# Patient Record
Sex: MALE | Race: WHITE
[De-identification: names, ages, dates, MRNs, and addresses within clinical notes are randomized per-mention and may not be internally consistent; named-entity substitution may affect disease eponyms.]

---

## 2017-04-19 ENCOUNTER — HOSPITAL ENCOUNTER (OUTPATIENT)
Dept: HOSPITAL 17 - NEPE | Age: 57
Setting detail: OBSERVATION
LOS: 1 days | Discharge: HOME | End: 2017-04-20
Attending: INTERNAL MEDICINE | Admitting: INTERNAL MEDICINE
Payer: MEDICARE

## 2017-04-19 VITALS
HEART RATE: 52 BPM | TEMPERATURE: 98.6 F | OXYGEN SATURATION: 95 % | RESPIRATION RATE: 20 BRPM | SYSTOLIC BLOOD PRESSURE: 107 MMHG | DIASTOLIC BLOOD PRESSURE: 61 MMHG

## 2017-04-19 VITALS
HEART RATE: 62 BPM | TEMPERATURE: 98.2 F | DIASTOLIC BLOOD PRESSURE: 63 MMHG | RESPIRATION RATE: 20 BRPM | SYSTOLIC BLOOD PRESSURE: 111 MMHG

## 2017-04-19 VITALS — DIASTOLIC BLOOD PRESSURE: 62 MMHG | SYSTOLIC BLOOD PRESSURE: 112 MMHG

## 2017-04-19 VITALS — WEIGHT: 209.44 LBS | BODY MASS INDEX: 38.54 KG/M2 | HEIGHT: 62 IN

## 2017-04-19 VITALS — OXYGEN SATURATION: 100 %

## 2017-04-19 VITALS
SYSTOLIC BLOOD PRESSURE: 120 MMHG | HEART RATE: 61 BPM | OXYGEN SATURATION: 100 % | DIASTOLIC BLOOD PRESSURE: 70 MMHG | RESPIRATION RATE: 17 BRPM

## 2017-04-19 DIAGNOSIS — Z79.899: ICD-10-CM

## 2017-04-19 DIAGNOSIS — R11.0: ICD-10-CM

## 2017-04-19 DIAGNOSIS — M54.9: ICD-10-CM

## 2017-04-19 DIAGNOSIS — F17.210: ICD-10-CM

## 2017-04-19 DIAGNOSIS — G89.29: ICD-10-CM

## 2017-04-19 DIAGNOSIS — R06.02: ICD-10-CM

## 2017-04-19 DIAGNOSIS — R00.1: ICD-10-CM

## 2017-04-19 DIAGNOSIS — R61: ICD-10-CM

## 2017-04-19 DIAGNOSIS — Z76.0: ICD-10-CM

## 2017-04-19 DIAGNOSIS — I25.10: ICD-10-CM

## 2017-04-19 DIAGNOSIS — I35.0: ICD-10-CM

## 2017-04-19 DIAGNOSIS — M54.2: ICD-10-CM

## 2017-04-19 DIAGNOSIS — R07.9: Primary | ICD-10-CM

## 2017-04-19 DIAGNOSIS — I10: ICD-10-CM

## 2017-04-19 DIAGNOSIS — F31.9: ICD-10-CM

## 2017-04-19 LAB
ANION GAP SERPL CALC-SCNC: 9 MEQ/L (ref 5–15)
APTT BLD: 26.6 SEC (ref 24.3–30.1)
BASOPHILS # BLD AUTO: 0.1 TH/MM3 (ref 0–0.2)
BASOPHILS NFR BLD: 1.1 % (ref 0–2)
BUN SERPL-MCNC: 16 MG/DL (ref 7–18)
CHLORIDE SERPL-SCNC: 103 MEQ/L (ref 98–107)
CK SERPL-CCNC: 54 U/L (ref 39–308)
CK SERPL-CCNC: 88 U/L (ref 39–308)
EOSINOPHIL # BLD: 0.1 TH/MM3 (ref 0–0.4)
EOSINOPHIL NFR BLD: 1.5 % (ref 0–4)
ERYTHROCYTE [DISTWIDTH] IN BLOOD BY AUTOMATED COUNT: 14.6 % (ref 11.6–17.2)
GFR SERPLBLD BASED ON 1.73 SQ M-ARVRAT: 55 ML/MIN (ref 89–?)
HCO3 BLD-SCNC: 29.1 MEQ/L (ref 21–32)
HCT VFR BLD CALC: 38.9 % (ref 39–51)
HEMO FLAGS: (no result)
INR PPP: 1 RATIO
LYMPHOCYTES # BLD AUTO: 1.7 TH/MM3 (ref 1–4.8)
LYMPHOCYTES NFR BLD AUTO: 24.3 % (ref 9–44)
MAGNESIUM SERPL-MCNC: 2.4 MG/DL (ref 1.5–2.5)
MCH RBC QN AUTO: 30.2 PG (ref 27–34)
MCHC RBC AUTO-ENTMCNC: 33.5 % (ref 32–36)
MCV RBC AUTO: 89.9 FL (ref 80–100)
MONOCYTES NFR BLD: 8.8 % (ref 0–8)
NEUTROPHILS # BLD AUTO: 4.5 TH/MM3 (ref 1.8–7.7)
NEUTROPHILS NFR BLD AUTO: 64.3 % (ref 16–70)
PLATELET # BLD: 273 TH/MM3 (ref 150–450)
POTASSIUM SERPL-SCNC: 4.4 MEQ/L (ref 3.5–5.1)
PROTHROMBIN TIME: 10.8 SEC (ref 9.8–11.6)
RBC # BLD AUTO: 4.33 MIL/MM3 (ref 4.5–5.9)
SODIUM SERPL-SCNC: 141 MEQ/L (ref 136–145)
WBC # BLD AUTO: 7.1 TH/MM3 (ref 4–11)

## 2017-04-19 PROCEDURE — 96372 THER/PROPH/DIAG INJ SC/IM: CPT

## 2017-04-19 PROCEDURE — 93017 CV STRESS TEST TRACING ONLY: CPT

## 2017-04-19 PROCEDURE — 93306 TTE W/DOPPLER COMPLETE: CPT

## 2017-04-19 PROCEDURE — G0378 HOSPITAL OBSERVATION PER HR: HCPCS

## 2017-04-19 PROCEDURE — 82550 ASSAY OF CK (CPK): CPT

## 2017-04-19 PROCEDURE — 85730 THROMBOPLASTIN TIME PARTIAL: CPT

## 2017-04-19 PROCEDURE — A9502 TC99M TETROFOSMIN: HCPCS

## 2017-04-19 PROCEDURE — 84484 ASSAY OF TROPONIN QUANT: CPT

## 2017-04-19 PROCEDURE — 99285 EMERGENCY DEPT VISIT HI MDM: CPT

## 2017-04-19 PROCEDURE — 85610 PROTHROMBIN TIME: CPT

## 2017-04-19 PROCEDURE — 71010: CPT

## 2017-04-19 PROCEDURE — 83735 ASSAY OF MAGNESIUM: CPT

## 2017-04-19 PROCEDURE — 78452 HT MUSCLE IMAGE SPECT MULT: CPT

## 2017-04-19 PROCEDURE — 99283 EMERGENCY DEPT VISIT LOW MDM: CPT

## 2017-04-19 PROCEDURE — 93005 ELECTROCARDIOGRAM TRACING: CPT

## 2017-04-19 PROCEDURE — 80048 BASIC METABOLIC PNL TOTAL CA: CPT

## 2017-04-19 PROCEDURE — 85025 COMPLETE CBC W/AUTO DIFF WBC: CPT

## 2017-04-19 NOTE — PD
HPI


Chief Complaint:  Chest Pain


Time Seen by Provider:  18:32


Travel History


International Travel<30 days:  No


Contact w/Intl Traveler<30days:  No


Traveled to known affect area:  No





History of Present Illness


HPI


Patient comes in complaining of substernal chest pain that began shortly prior 

to arrival.  Patient states he had 2 baby aspirins en route by EMS with no 

improvement of symptoms.  Patient states over the past couple months he been 

having pain in his bilateral anterior thighs is been causing him to fall.  

States his primary care doctor has referred him to orthopedics as an 

outpatient.  Patient reports associated shortness of breath with chest pain 

today as well as diaphoresis and nausea.  Patient state also having pain in his 

left upper extremity with this.  Patient reports a history of chronic neck and 

back pain as well as aortic stenosis.  Denies IV drug use or fevers.





PFSH


Past Medical History


Hx Anticoagulant Therapy:  No


Arthritis:  Yes


Bipolar Disorder:  Yes


Anxiety:  Yes


Depression:  Yes


Cancer:  No


Cardiovascular Problems:  Yes (Aortic Stenosis)


Chemotherapy:  No


Cerebrovascular Accident:  No


Diabetes:  No


Endocrine:  No


Gastrointestinal Disorders:  No


Genitourinary:  Yes (URETHRAL BLEEDING; DISCOLORATION - BANGURA IN PLACE)


Hepatitis:  No


Hiatal Hernia:  No


Hypertension:  Yes


Immune Disorder:  No


Musculoskeletal:  Yes (chronic pain per pt)


Neurologic:  Yes (CERVICAL AND LUMBAR FUSIONS; NEUROPATHY LEFT ARM & LEG )


Psychiatric:  Yes (ANXIETY/MOOD DISORDER)


Reproductive:  No


Respiratory:  No


Thyroid Disease:  No





Past Surgical History


Abdominal Surgery:  No


Body Medical Devices:  PLATES & SCREWS NECK


Cardiac Surgery:  No


Ear Surgery:  No


Endocrine Surgery:  No


Eye Surgery:  No


Genitourinary Surgery:  Yes (cysto)


Neurologic Surgery:  Yes


Oral Surgery:  No


Thoracic Surgery:  No


Other Surgery:  Yes (HAND RECONSTRUCTION RIGHT )





Social History


Alcohol Use:  Yes


Tobacco Use:  Yes


Substance Use:  Yes





Allergies-Medications


(Allergen,Severity, Reaction):  


Coded Allergies:  


     Morphine (Verified  Allergy, Unknown, swelling, 4/19/17)


     Phenobarbital (Verified  Allergy, Unknown, unknown, 4/19/17)


Reported Meds & Prescriptions





Reported Meds & Active Scripts


Active


Zofran ODT (Ondansetron HCl) 4 Mg Tab 4 Mg SL Q6H PRN


     FOR NAUSEA/VOMITING


Meclizine Hcl (Meclizine HCl) 25 Mg Chw 25 Mg PO Q8H PRN


Celebrex (Celecoxib) 100 Mg Cap 100 Mg PO DAILY 


Xanax 1 mg (Alprazolam) Alprazolam 1 mg Tab 1 Tab PO TID PRN


Depakote  mg (Divalproex Sodium) 500 Mg Tab 1 Tab PO DAILY 


Zestoretic 20/12.5 (Lisinopril/Hctz 20 mg/12.5 mg) 20 Mg/12.5 Mg Tab 1 Tab PO 

DAILY 


Robaxin  500 Mg Tab (Methocarbamol) 500 Mg Tab 500 Mg PO QID PRN 7 Days


Reported


Cialis (Tadalafil) 20 Mg Tab 20 Mg PO DAILY PRN


Testosterone Cypionate 100 Mg/Ml  Inj 100 Mg IM Q7DAY 








Review of Systems


Except as stated in HPI:  all other systems reviewed are Neg





Physical Exam


Narrative


GENERAL: Well-developed, well nourished, in no acute distress, and non-ill 

appearing.


SKIN: Focused skin assessment warm and dry.


HEAD: Atraumatic. Normocephalic. 


EYES: Pupils equal and round. EOMI. No scleral icterus. No injection or 

drainage. 


ENT: No nasal bleeding or discharge.  Mucous membranes pink and moist.


NECK: Trachea midline. No JVD. Supple.  No nuclear rigidity.


CARDIOVASCULAR: Regular rate and rhythm.  Murmur appreciated.


RESPIRATORY: No accessory muscle use.  No respiratory distress. Clear to 

auscultation. Breath sounds equal bilaterally. 


GASTROINTESTINAL: Abdomen soft, non-tender, nondistended. Hepatic and splenic 

margins not palpable. No pulsatile mass.


MUSCULOSKELETAL: No obvious deformities. No clubbing.  No cyanosis.  No edema.  

Full range of motion.


NEUROLOGICAL: Awake and alert. No obvious cranial nerve deficits.  Motor 

grossly within normal limits. Normal speech.


PSYCHIATRIC: Appropriate mood and affect; insight and judgment normal.





Data


Data


Last Documented VS





Vital Signs








  Date Time  Temp Pulse Resp B/P Pulse Ox O2 Delivery O2 Flow Rate FiO2


 


4/19/17 20:36  61 17 120/70 100 Nasal Cannula 1 


 


4/19/17 18:45 98.2       








Orders





 Electrocardiogram (4/19/17 18:41)


Basic Metabolic Panel (Bmp) (4/19/17 18:41)


Ckmb (Isoenzyme) Profile (4/19/17 18:41)


Complete Blood Count With Diff (4/19/17 18:41)


Magnesium (Mg) (4/19/17 18:41)


Prothrombin Time / Inr (Pt) (4/19/17 18:41)


Act Partial Throm Time (Ptt) (4/19/17 18:41)


Troponin I (4/19/17 18:41)


Chest, Single Ap (4/19/17 18:41)


Ecg Monitoring (4/19/17 18:41)


Bilateral Bp Monitoring (4/19/17 18:41)


Iv Access Insert/Monitor (4/19/17 18:41)


Oximetry (4/19/17 18:41)


Oxygen Administration (4/19/17 18:41)


Sodium Chloride 0.9% Flush (Ns Flush) (4/19/17 18:45)


Acetaminophen (Tylenol) (4/19/17 20:30)


Ketorolac Inj (Toradol Inj) (4/19/17 20:45)


Admit Order (Ed Use Only) (4/19/17 20:39)





Labs





 Laboratory Tests








Test 4/19/17





 18:30


 


White Blood Count 7.1 TH/MM3


 


Red Blood Count 4.33 MIL/MM3


 


Hemoglobin 13.0 GM/DL


 


Hematocrit 38.9 %


 


Mean Corpuscular Volume 89.9 FL


 


Mean Corpuscular Hemoglobin 30.2 PG


 


Mean Corpuscular Hemoglobin 33.5 %





Concent 


 


Red Cell Distribution Width 14.6 %


 


Platelet Count 273 TH/MM3


 


Mean Platelet Volume 9.0 FL


 


Neutrophils (%) (Auto) 64.3 %


 


Lymphocytes (%) (Auto) 24.3 %


 


Monocytes (%) (Auto) 8.8 %


 


Eosinophils (%) (Auto) 1.5 %


 


Basophils (%) (Auto) 1.1 %


 


Neutrophils # (Auto) 4.5 TH/MM3


 


Lymphocytes # (Auto) 1.7 TH/MM3


 


Monocytes # (Auto) 0.6 TH/MM3


 


Eosinophils # (Auto) 0.1 TH/MM3


 


Basophils # (Auto) 0.1 TH/MM3


 


CBC Comment DIFF FINAL 


 


Differential Comment  


 


Prothrombin Time 10.8 SEC


 


Prothromb Time International 1.0 RATIO





Ratio 


 


Activated Partial 26.6 SEC





Thromboplast Time 


 


Sodium Level 141 MEQ/L


 


Potassium Level 4.4 MEQ/L


 


Chloride Level 103 MEQ/L


 


Carbon Dioxide Level 29.1 MEQ/L


 


Anion Gap 9 MEQ/L


 


Blood Urea Nitrogen 16 MG/DL


 


Creatinine 1.34 MG/DL


 


Estimat Glomerular Filtration 55 ML/MIN





Rate 


 


Random Glucose 85 MG/DL


 


Calcium Level 9.1 MG/DL


 


Magnesium Level 2.4 MG/DL


 


Total Creatine Kinase 88 U/L


 


Troponin I LESS THAN 0.02





 NG/ML











MDM


Medical Decision Making


Medical Screen Exam Complete:  Yes


Emergency Medical Condition:  Yes


Interpretation(s)


EKG reviewed by Dr. Resendiz shows sinus rhythm with ventricular rate of 67.  No 

STEMI.


Differential Diagnosis


Acute coronary syndrome, electrolyte abnormality, arrhythmia, atypical chest 

pain, noncardiac chest pain, other


Narrative Course


Patient is a exam.  Initial laboratory neurological status were obtained and 

reviewed.  Discussed patient with Dr. Nunes, recommends having patient placed in 

the chest pain center for further treatment and evaluation.  Discussed all 

findings and plan care of patient, who is agreeable for admission.  All 

questions were answered.  Patient was given a dose of Tylenol for his pain 

however was requesting Dilaudid.  Patient was given a dose of Toradol after 

discussing this with Dr. Nunes.





Diagnosis





 Primary Impression:  


 Chest pain


 Qualified Code:  R07.9 - Chest pain, unspecified type





Admitting Information


Admitting Physician Requests:  Observation


Condition:  Stable








Brayden Ram Apr 19, 2017 18:33

## 2017-04-20 ENCOUNTER — HOSPITAL ENCOUNTER (EMERGENCY)
Dept: HOSPITAL 17 - NEPK | Age: 57
Discharge: HOME | End: 2017-04-20
Payer: MEDICARE

## 2017-04-20 VITALS
SYSTOLIC BLOOD PRESSURE: 110 MMHG | OXYGEN SATURATION: 93 % | HEART RATE: 51 BPM | TEMPERATURE: 96.8 F | DIASTOLIC BLOOD PRESSURE: 58 MMHG | RESPIRATION RATE: 20 BRPM

## 2017-04-20 VITALS
DIASTOLIC BLOOD PRESSURE: 60 MMHG | SYSTOLIC BLOOD PRESSURE: 110 MMHG | HEART RATE: 54 BPM | OXYGEN SATURATION: 94 % | RESPIRATION RATE: 20 BRPM | TEMPERATURE: 96.3 F

## 2017-04-20 VITALS — HEART RATE: 51 BPM

## 2017-04-20 VITALS
SYSTOLIC BLOOD PRESSURE: 101 MMHG | RESPIRATION RATE: 18 BRPM | TEMPERATURE: 97.8 F | HEART RATE: 52 BPM | DIASTOLIC BLOOD PRESSURE: 56 MMHG | OXYGEN SATURATION: 97 %

## 2017-04-20 VITALS
OXYGEN SATURATION: 96 % | HEART RATE: 55 BPM | RESPIRATION RATE: 16 BRPM | SYSTOLIC BLOOD PRESSURE: 112 MMHG | TEMPERATURE: 98.6 F | DIASTOLIC BLOOD PRESSURE: 58 MMHG

## 2017-04-20 VITALS — BODY MASS INDEX: 30.93 KG/M2 | WEIGHT: 216.05 LBS | HEIGHT: 70 IN

## 2017-04-20 VITALS
OXYGEN SATURATION: 100 % | HEART RATE: 70 BPM | SYSTOLIC BLOOD PRESSURE: 111 MMHG | TEMPERATURE: 97.8 F | RESPIRATION RATE: 16 BRPM | DIASTOLIC BLOOD PRESSURE: 71 MMHG

## 2017-04-20 VITALS — HEART RATE: 57 BPM

## 2017-04-20 DIAGNOSIS — Z76.0: ICD-10-CM

## 2017-04-20 DIAGNOSIS — I10: ICD-10-CM

## 2017-04-20 DIAGNOSIS — G89.29: Primary | ICD-10-CM

## 2017-04-20 LAB — CK SERPL-CCNC: 50 U/L (ref 39–308)

## 2017-04-20 PROCEDURE — 99283 EMERGENCY DEPT VISIT LOW MDM: CPT

## 2017-04-20 PROCEDURE — 96372 THER/PROPH/DIAG INJ SC/IM: CPT

## 2017-04-20 NOTE — PD
Physical Exam


Time Seen by Provider:  17:31


Narrative


55yo M requesting pain medication.  Was just discharged from the hospital and 

was requesting pain medication and Xanax before he left and they would not 

administer it before discharge.  Says he has pain medication at home, but needs 

something for the bus ride home.  





Patient stable.  Patient seen in triage. Awaiting bed placement.





Data


Data


Last Documented VS





Vital Signs








  Date Time  Temp Pulse Resp B/P Pulse Ox O2 Delivery O2 Flow Rate FiO2


 


4/20/17 17:24 97.8 70 16 111/71 100   











MDM


Supervised Visit with ANA:  Marisa Crane Apr 20, 2017 17:34

## 2017-04-20 NOTE — PD
HPI


.


needs pain meds and xanax


Chief Complaint:  Pain: Acute or Chronic


Time Seen by Provider:  16:55


Travel History


International Travel<30 days:  No


Contact w/Intl Traveler<30days:  No


Traveled to known affect area:  No





History of Present Illness


HPI


56-year-old male who was recently discharged from the hospital here requesting 

pain meds and Xanax.  Patient was admitted overnight for chest pain and was 

found to have negative evidence of acute coronary syndrome.  Apparently he's 

been requesting pain medications prior to discharge and was declined by the 

physician in the chest pain Center.  Patient checked out of the hospital and 

came right over to the emergency department for medication refills. He is 

wanting pain medications because he was due to have pain meds at 330pm, but 

states since he was discharged and they would not give him his medication.  He 

takes MS contin and Percocet.





PFSH


Past Medical History


Hx Anticoagulant Therapy:  No


Arthritis:  Yes


Bipolar Disorder:  Yes


Anxiety:  Yes


Depression:  Yes


Heart Rhythm Problems:  No


Cancer:  No


Cardiac Catheterization:  No


Cardiovascular Problems:  Yes (aoritc stents )


High Cholesterol:  No


Chemotherapy:  No


Congestive Heart Failure:  No


Cerebrovascular Accident:  No


Coronary Artery Disease:  Yes (Aortic Stenosis. Mother and Father also had)


Diabetes:  No


Diminished Hearing:  No


Endocrine:  No


Gastrointestinal Disorders:  No


Genitourinary:  Yes (URETHRAL BLEEDING; DISCOLORATION - BANGURA IN PLACE)


Hepatitis:  No


Hiatal Hernia:  No


Hypertension:  Yes


Immune Disorder:  No


Musculoskeletal:  Yes (chronic pain per pt)


Neurologic:  Yes (CERVICAL AND LUMBAR FUSIONS; NEUROPATHY LEFT ARM & LEG )


Psychiatric:  Yes (ANXIETY/MOOD DISORDER)


Reproductive:  No


Respiratory:  No


Thyroid Disease:  No





Past Surgical History


Abdominal Surgery:  No


Body Medical Devices:  PLATES & SCREWS NECK


Cardiac Surgery:  No


Coronary Artery Bypass Graft:  No


Ear Surgery:  No


Endocrine Surgery:  No


Eye Surgery:  No


Genitourinary Surgery:  Yes (cysto)


Neurologic Surgery:  Yes


Oral Surgery:  No


Thoracic Surgery:  No


Other Surgery:  Yes (HAND RECONSTRUCTION RIGHT )





Social History


Alcohol Use:  Yes (Rare)


Tobacco Use:  No


Substance Use:  No





Allergies-Medications


(Allergen,Severity, Reaction):  


Coded Allergies:  


     Morphine (Verified  Allergy, Unknown, swelling, 4/20/17)


     Phenobarbital (Verified  Allergy, Unknown, unknown, 4/20/17)


Reported Meds & Prescriptions





Reported Meds & Active Scripts


Active


Reported


Zestoretic (Lisinopril-Hctz) 20-12.5 Mg Tab 1 Tab PO DAILY


Zanaflex (Tizanidine HCl) 4 Mg Cap 4 Mg PO TID


Cymbalta DR (Duloxetine HCl) 60 Mg Capdr 60 Mg PO DAILY


Alprazolam 1 Mg Tab 1 Mg PO QID PRN








Review of Systems


General / Constitutional:  No: Fever


Eyes:  No: Visual changes


HENT:  No: Headaches


Cardiovascular:  No: Chest Pain or Discomfort


Respiratory:  No: Shortness of Breath


Gastrointestinal:  No: Abdominal Pain


Genitourinary:  No: Dysuria


Musculoskeletal:  Positive: Pain (chronic joint pain)


Skin:  No Rash


Neurologic:  No: Weakness


Psychiatric:  No: Depression


Endocrine:  No: Polydipsia


Hematologic/Lymphatic:  No: Easy Bruising





Physical Exam


Narrative


GENERAL: AAO x 3, no acute distress, Well-nourished, well-developed patient.


SKIN: Warm and dry. No visible rashes or bruising. 


HEAD: Normocephalic and atraumatic.


EYES: No scleral icterus. No injection or drainage. 


ENT: No nasal drainage noted. Mucous membranes pink. Airway patent. 


NECK: Supple, trachea midline. No JVD.


CARDIOVASCULAR: Regular rate and rhythm without murmurs, gallops, or rubs. 


RESPIRATORY: Breath sounds equal bilaterally. No accessory muscle use. No 

rhonchi or rales. 


GASTROINTESTINAL: Abdomen soft, non-tender, nondistended. 


EXTREMITIES: No cyanosis or edema.  Patient is ambulatory but has slight 

ataxia. He has normal posterior tibial pulses. Legs are symmetrical. No 

tenderness to palpation. 


BACK: Nontender without obvious deformity. No CVA tenderness.


PSYCH: AAO x 3, normal affect.





Data


Data


Last Documented VS





Vital Signs








  Date Time  Temp Pulse Resp B/P Pulse Ox O2 Delivery O2 Flow Rate FiO2


 


4/20/17 17:24 97.8 70 16 111/71 100   








Orders





 Ketorolac Inj (Toradol Inj) (4/20/17 18:00)








MDM


Medical Decision Making


Medical Screen Exam Complete:  Yes


Emergency Medical Condition:  Yes


Medical Record Reviewed:  Yes


Differential Diagnosis


chronic pain, drug seeking behavior,


Narrative Course


56-year-old male who was recently discharged from the hospital here requesting 

pain meds and Xanax.  Patient was admitted overnight for chest pain and was 

found to have negative evidence of acute coronary syndrome.  Apparently he's 

been requesting pain medications prior to discharge and was declined by the 

physician in the chest pain Center.  Patient checked out of the hospital and 

came right over to the emergency department for medication refills. he tells 

the nurse he has left leg pain. He shows me his right leg.  He is wanting pain 

medications because he was due to have pain meds at 330pm, but states since he 

was discharged and they would not give him his medication.  He takes MS contin 

and Percocet. 





Patient seen and examined.  I do not appreciate any abnormal findings on 

examination except for slightly ataxic gait. 


I've offered him toradol and he has accepted.


Case management has arranged transportation to take him back to his home in 

[place].








Patient verbalized understanding of instructions, questions were answered, and 

thanked me for their care. I advised them if their condition worsens, please 

return to the nearest emergency room for further care.





Diagnosis





 Primary Impression:  


 Chronic pain


 Qualified Code:  G89.29 - Other chronic pain


Patient Instructions:  General Instructions





***Additional Instructions:


Please return to emergency department if your symptoms return or worsen. 


Follow up with your primary care provider. 


Take medications as prescribed.


***Med/Other Pt SpecificInfo:  No Change to Meds


Disposition:  01 DISCHARGE HOME


Condition:  Stable








Mona Babin Apr 20, 2017 17:42





Mona Babin Apr 20, 2017 17:42

## 2017-04-20 NOTE — EKG
Date Performed: 04/19/2017       Time Performed: 18:25:51

 

PTAGE:      56 years

 

EKG:      Sinus rhythm 

 

 NONSPECIFIC T-WAVE ABNORMALITY BORDERLINE ECG

 

PREVIOUS TRACING       : 08/23/2015 17.08 Compared to previous tracing T wave changes are new.

 

DOCTOR:   Tyson Goodman  Interpretating Date/Time  04/20/2017 13:07:58

## 2017-04-20 NOTE — EKG
Date Performed: 04/20/2017       Time Performed: 00:52:38

 

PTAGE:      56 years

 

EKG:      SINUS BRADYCARDIA NONSPECIFIC T-WAVE ABNORMALITY BORDERLINE ECG

 

PREVIOUS TRACING       : 04/19/2017 21.51 Since previous tracing, no significant change noted

 

DOCTOR:   Tyson Goodman  Interpretating Date/Time  04/20/2017 13:10:26

## 2017-04-20 NOTE — HHI.HP
HPI


Primary Care Physician


No Primary Care Physician


Chief Complaint


Chest pain


History of Present Illness


This is a 56-year-old male that presents to the ED via [facility] from [place] with a 

complaint of chest discomfort.  Patient states he has history of heart disease.

  States he had a heart catheterization 8 years ago in another state and was 

told he had mild blockages and he also had a 2-D echo that showed aortic 

stenosis.  He states he was told that he would need to have this monitored as 

he will need to have this replaced down the road.  He is now followed up with 

cardiology since.  Patient states her last 2 days he's had a central pressure 

intermittently.  She is brought on while walking and that is essentially most 

exertional he does.  At times also is happened while at rest.  If happening 

while exerting himself with walking the discomfort wheezing last for 10-30 

minutes after stopping the activity.  With the symptoms he also has gotten 

associated shortness of breath, nausea, and diaphoresis.  Denies recent 

illnesses.  Denies fevers or chills.  Currently denies chest discomfort is 

requesting his morphine and OxyContin for his chronic neck and back pain.





Review of Systems


General: Patient denies fevers, chills recent, and recent travel


HEENT: Patient denies headache, sore throat, difficulty swallowing.  


Cardiovascular: Has the chest discomfort as mentioned above.  Denies sensation 

of heart beating rapidly or irregularly.  No syncope.  There was diaphoresis.


Respiratory: Patient has been short of breath.  Denies inspirational chest 

discomfort.  Denies coughing wheezing or hemoptysis.  


GI: Patient had intermittent nausea.  Patient denies vomiting, diarrhea, 

abdominal pain, bloody stools.


Musculoskeletal: Patient denies joint pain or edema.  Denies calf pain or edema.


Neurovascular: Patient denies numbness, tingling, weakness in extremities.  

Denies headache.


Endocrine: Denies polyuria and polydipsia.


Hematologic: Denies easy bruising.


Skin: Denies rash or itching.





Past Family Social History


Allergies:  


Coded Allergies:  


     Morphine (Verified  Allergy, Unknown, swelling, 4/19/17)


     Phenobarbital (Verified  Allergy, Unknown, unknown, 4/19/17)


Past Medical History


Stated history of CAD and aortic stenosis.  Also history of hypertension, 

tobacco abuse, and depression.  Denies diabetes and hyperlipidemia.


Past Surgical History


Cardiac catheterization without intervention mother years ago.  He's had hand 

surgery, neck and back surgery.


Reported Medications





Reported Meds & Active Scripts


Active


Xanax 1 mg (Alprazolam) Alprazolam 1 mg Tab 1 Tab PO TID PRN


Zestoretic 20/12.5 (Lisinopril/Hctz 20 mg/12.5 mg) 20 Mg/12.5 Mg Tab 1 Tab PO 

DAILY


Robaxin  500 Mg Tab (Methocarbamol) 500 Mg Tab 500 Mg PO QID PRN 7 Days


Reported


Cymbalta DR (Duloxetine HCl) 60 Mg Capdr 60 Mg PO DAILY


Alprazolam 1 Mg Tab 1 Mg PO QID PRN


Oxycodone (Oxycodone HCl) 10 Mg Tab 10 Mg PO Q4H PRN


Ms Contin (Morphine Sulfate) 30 Mg Tab 30 Mg PO Q8H


Active Ordered Medications





 Current Medications








 Medications


  (Trade)  Dose


 Ordered  Sig/Lashawn


 Route  Start Time


 Stop Time Status Last Admin


 


  (NS Flush)  2 ml  UNSCH  PRN


 IVF  4/19/17 18:45


     


 


 


  (NS Flush)  2 ml  UNSCH  PRN


 IV FLUSH  4/19/17 21:00


     


 


 


  (Xanax)  1 mg  TID  PRN


 PO  4/20/17 08:15


     


 


 


  (Robaxin)  500 mg  QID  PRN


 PO  4/20/17 08:15


     


 


 


  (Prinivil)  20 mg  DAILY


 PO  4/20/17 09:00


     


 


 


  (Hydrodiuril)  12.5 mg  DAILY


 PO  4/20/17 09:00


     


 


 


 Non-Formulary


 Medication  10 mg  Q4H  PRN


 PO  4/20/17 09:15


   UNV  


 








Family History


He states his mother needed a bypass at age 74.


Social History


Patient smokes on average 4-5 cigarettes per day and has done so for about 20 

years.  He denies alcohol or illicit drugs.





Physical Exam


Vital Signs





 Vital Signs








  Date Time  Temp Pulse Resp B/P Pulse Ox O2 Delivery O2 Flow Rate FiO2


 


4/20/17 08:03 96.8 51 20 110/58 93   


 


4/20/17 04:00  51      


 


4/20/17 03:30 98.6 55 16 112/58 96   


 


4/20/17 03:14        21


 


4/20/17 00:27 97.8 52 18 101/56 97   


 


4/19/17 22:23 98.6 52 20 107/61 95   


 


4/19/17 20:36  61 17 120/70 100 Nasal Cannula 1 


 


4/19/17 19:16    112/62    


 


4/19/17 19:13     100 Nasal Cannula 2 


 


4/19/17 18:45 98.2 62 20 111/63  Nasal Cannula 2 


 


4/19/17 18:30      Nasal Cannula 2 


 


4/19/17 18:28   18  97 Room Air  








Physical Exam


GENERAL: This is a well-nourished, well-developed patient, in no apparent 

distress.  Patient speaks in clear complete sentences.  Patient is pleasant.


HEENT: Head is atraumatic and normocephalic.  Neck is supple without 

lymphadenopathy and trachea is midline.  No JVD or carotid bruits.


CARDIOVASCULAR: Regular rate and rhythm without gallops or rubs.  There is a at 

least grade 3 systolic murmur throughout auscultating but more pronounced right 

sternal border radiating into the right neck.


RESPIRATORY: Clear to auscultation. Breath sounds equal bilaterally. No wheezes

, rales, or rhonchi.  Chest wall is nontender.  No use of accessory muscles.


GASTROINTESTINAL: Abdomen is nontender, nondistended.  Abdomen soft.  No 

obvious pulsatile mass or bruit.  No CVA tenderness.  Strong femoral pulses 

bilaterally.  Normal bowel sounds in all quadrants.


MUSCULOSKELETAL: Patient is moving upper and lower extremities freely.  No calf 

tenderness or edema, no Homans sign.  Strong pulses in upper and lower 

extremities.


NEUROLOGICAL: Patient is alert and oriented.  Cranial nerves 2-12 are grossly 

intact.  No focal deficits and speech is clear.


SKIN: No rash and turgor is normal.


Laboratory





Laboratory Tests








Test 4/19/17 4/19/17 4/20/17





 18:30 21:51 00:45


 


White Blood Count 7.1   


 


Red Blood Count 4.33   


 


Hemoglobin 13.0   


 


Hematocrit 38.9   


 


Mean Corpuscular Volume 89.9   


 


Mean Corpuscular Hemoglobin 30.2   


 


Mean Corpuscular Hemoglobin 33.5   





Concent   


 


Red Cell Distribution Width 14.6   


 


Platelet Count 273   


 


Mean Platelet Volume 9.0   


 


Neutrophils (%) (Auto) 64.3   


 


Lymphocytes (%) (Auto) 24.3   


 


Monocytes (%) (Auto) 8.8   


 


Eosinophils (%) (Auto) 1.5   


 


Basophils (%) (Auto) 1.1   


 


Neutrophils # (Auto) 4.5   


 


Lymphocytes # (Auto) 1.7   


 


Monocytes # (Auto) 0.6   


 


Eosinophils # (Auto) 0.1   


 


Basophils # (Auto) 0.1   


 


CBC Comment DIFF FINAL   


 


Differential Comment    


 


Prothrombin Time 10.8   


 


Prothromb Time International 1.0   





Ratio   


 


Activated Partial 26.6   





Thromboplast Time   


 


Sodium Level 141   


 


Potassium Level 4.4   


 


Chloride Level 103   


 


Carbon Dioxide Level 29.1   


 


Anion Gap 9   


 


Blood Urea Nitrogen 16   


 


Creatinine 1.34   


 


Estimat Glomerular Filtration 55   





Rate   


 


Random Glucose 85   


 


Calcium Level 9.1   


 


Magnesium Level 2.4   


 


Total Creatine Kinase 88  54  50 


 


Troponin I LESS THAN 0.02  LESS THAN 0.02  LESS THAN 0.02 








Result Diagram:  


4/19/17 1830 4/19/17 1830





Imaging





Last 24 hours Impressions








Chest X-Ray 4/19/17 1841 Signed





Impressions: 





 Service Date/Time:  Wednesday, April 19, 2017 18:40 - CONCLUSION: No acute 





 disease.       Betito Manuel Jr., MD 








Course


EKGs have sinus rhythm to sinus bradycardia with nonspecific T-wave changes.





Assessment and Plan


Assessment and Plan


* Chest pain: Patient has had serial cardiac enzymes and EKGs for ruling out 

purposes.  He will be seen by Dr. Goodman of cardiology in the chest pain 

center.  We will get a 2-D echo to evaluate the murmur and if there is no 

severe aortic stenosis then he will proceed with a stress test.


* Hypertension: Continue current medication.


* Chronic neck and back pain: We'll continue his medications.


* Depression: Continue current medication.


* Tobacco abuse: Patient has been counseled on the importance of smoking 

cessation.


Patient is stable at this time.  He is agreeable to this plan.








Marco A Cisneros Apr 20, 2017 09:22

## 2017-04-20 NOTE — RADRPT
EXAM DATE/TIME:  04/20/2017 12:31 

 

[facility] COMPARISON:     

No previous studies available for comparison.

 

 

INDICATIONS :     

Chest discomfort. Angina. 

                           

 

DOSE:     

26.2 mCi Tc99m Myoview at stress.

                     8.4 mCi Tc99m Myoview at rest.

                     0.4 mg Lexiscan

                       

 

 

STRESS SYMPTOMS:      

Shortness of breath.

                       

 

 

EJECTION FRACTION:       

53%

                       

 

MEDICAL HISTORY :     

Hypertension.   Cardiac cath.

 

SURGICAL HISTORY :         

Right knee surgery, aortic stents placed and cervical and lumbar fusion.

 

ENCOUNTER:     

Initial

 

ACUITY:     

1 day

 

PAIN SCALE:     

3/10

 

LOCATION:      

Bilateral chest 

 

TECHNIQUE:     

The patient underwent pharmacologic stress with infusion of prescribed dose.  Continuous ECG tracing 
was monitored during stress.  Gated SPECT imaging was performed after stress and conventional SPECT i
maging was performed at rest.  The examination was performed on a SPECT/CT scanner, both attenuation 
and non-corrected datasets were reviewed.

 

FINDINGS:     

 

DISTRIBUTION:     

The maximum perfused segment at stress is in the anteroseptal wall.

 

PERFUSION STUDY:     

The pattern of perfusion at stress demonstrates reduction in perfusion to the posterior basal wall wh
ich is fixed during rest without any significant ischemia.

 

GATED STUDY:     

There is intact wall motion and thickening without hypokinetic or dyskinetic segments. 

 

CONCLUSION:     

No appreciable ischemia.

 

RISK CATEGORY:     Low (<1% Annual Mortality Rate)

 

 

 

 

 YADY Stearns MD on April 20, 2017 at 14:35           

Board Certified Radiologist.

 This report was verified electronically.

## 2017-04-20 NOTE — HHI.DCPOC
Discharge Care Plan


Diagnosis:  


(1) Chest pain


(2) Hypertension


(3) Aortic stenosis


(4) Tobacco abuse


(5) Depression


Goals to Promote Your Health


* To prevent worsening of your condition and complications


* To maintain your health at the optimal level


Directions to Meet Your Goals


*** Take your medications as prescribed


*** Follow your dietary instruction


*** Follow activity as directed








*** Keep your appointments as scheduled


*** Take your immunizations and boosters as scheduled


*** If your symptoms worsen call your PCP, if no PCP go to Urgent Care Center 

or Emergency Room***


*** Smoking is Dangerous to Your Health. Avoid second hand smoke***


***Call the 24-hour hour crisis hotline for domestic abuse at 1-568.985.8864***








Marco A Cisneros Apr 20, 2017 15:22

## 2017-04-20 NOTE — EKG
Date Performed: 04/19/2017       Time Performed: 21:51:15

 

PTAGE:      56 years

 

EKG:      SINUS BRADYCARDIA NONSPECIFIC T-WAVE ABNORMALITY BORDERLINE ECG

 

PREVIOUS TRACING       : 04/19/2017 18.25 Since previous tracing, no significant change noted

 

DOCTOR:   Tyson Goodman  Interpretating Date/Time  04/20/2017 11:25:26

## 2017-04-20 NOTE — EC
Study

 

Study Date:04/20/2017

 

 

 

STUDY CONCLUSIONS

 

SUMMARY

 

- Left ventricle: The cavity size was normal. Wall thickness was

normal. Systolic function was normal. The estimated ejection

fraction was in the range of 55% to 65%. Wall motion was normal;

there were no regional wall motion abnormalities.

- Aortic valve: Transvalvular velocity was minimally increased.

There was mild to moderate stenosis. Valve area: 0.75cm^2(VTI).

Valve area: 0.81cm^2 (Vmax).

 

-------------------------------------------------------------------

If LV function is below 40, please consider prescribing an ACEI or

ARB or document rationale for non-use.

 

-------------------------------------------------------------------

PROCEDURE DATA

 

STUDY STATUS:

Elective. Procedure: Transthoracic echocardiography.

Image quality was good. Scanning was performed from the

parasternal, apical, and subcostal acoustic windows. Study

completion: The patient tolerated the procedure well.

Transthoracic echocardiography. M-mode, complete 2D, complete

spectral Doppler, and color Doppler. Height: Height: 62in. Weight:

Weight: 208.6lb. Body mass index: BMI: 38.2kg/m^2. Body surface

area:   BSA: 1.95m^2. Patient status: Inpatient.

 

-------------------------------------------------------------------

CARDIAC ANATOMY

 

LEFT VENTRICLE:

The cavity size was normal. Wall thickness was

normal. Systolic function was normal. The estimated ejection

fraction was in the range of 55% to 65%. Wall motion was normal;

there were no regional wall motion abnormalities.

 

AORTIC VALVE:

Trileaflet; normal thickness leaflets. Doppler:

Transvalvular velocity was minimally increased. There was mild to

moderate stenosis.  Valve area: 0.75cm^2(VTI). Indexed valve area:

0.38cm^2/m^2 (VTI). Valve area: 0.81cm^2 (Vmax). Indexed valve

area: 0.42cm^2/m^2 (Vmax).  Mean gradient: 23mm Hg (S). Peak

gradient: 44mm Hg (S).

 

AORTA:

Aortic root: The aortic root was normal in size.

 

MITRAL VALVE:

Structurally normal valve. Doppler: Transvalvular

velocity was within the normal range. There was no evidence for

stenosis. No regurgitation.  Peak gradient: 2mm Hg (D).

 

LEFT ATRIUM:

The atrium was normal in size.

 

RIGHT VENTRICLE:

The cavity size was normal. Wall thickness was

normal.

 

PULMONIC VALVE:

Doppler: Transvalvular velocity was within the

normal range. There was no evidence for stenosis. No regurgitation.

 

TRICUSPID VALVE:

Structurally normal valve. Doppler: Transvalvular

velocity was within the normal range. No regurgitation.

 

PULMONARY ARTERY:

The main pulmonary artery was normal-sized.

Systolic pressure was within the normal range.

 

RIGHT ATRIUM:

The atrium was normal in size.

 

PERICARDIUM:

There was no pericardial effusion.

 

SYSTEMIC VEINS:

Inferior vena cava: The vessel was normal in size.

 

-------------------------------------------------------------------

 

Patient weight: 208.6lb

_Ejection fraction:_ 65-75%

_Fractional shortening:_ 32%

up to 5Kg 5-11.5Kg 11.6-22.9Kg 23-45Kg 45-57Kg

Aortic Root 7-13      <17      13-22       17-27   17-27

LA diam     6-13      <23      24-38       33-47   37-40

RVID        10-17     7-15     7-15        7-18    8-17

LVIDd       12-22     <32      24-38       33-47   37-40

LVPW        2-4       3-6      5-7         6-8     7-8

IVS         2-4       3-6      5-7         6-8     7-8

 

-------------------------------------------------------------------

 

BASIC MEASUREMENTS                                     ADULT NORMAL

Left ventricle

LV internal dimension, ED, chordal       46.3 mm       43-52

level, PLAX

LV internal dimension, ES, chordal       31.7 mm       23-38

level, PLAX

Fractional shortening, chordal level,      32 %        >29

PLAX

LV posterior wall thickness, ED          10.1 mm       ------------

IVS/LVPW ratio, ED                          1          <1.3

Ventricular septum

Septal thickness, ED                     10.1 mm       ------------

Aorta

Root diameter, ED                          31 mm       ------------

Left atrium

Anterior-posterior dimension               29 mm       ------------

Anterior-posterior dimension index       1.49 cm/m^2   <2.2

 

DOPPLER MEASUREMENTS                                   ADULT NORMAL

Main pulmonary artery

Pressure, S                                26 mm Hg    =30

Aortic valve

Peak velocity, S                          312 cm/s     ------------

Mean velocity, S                          220 cm/s     ------------

VTI, S                                   71.2 cm       ------------

Mean gradient, S                           23 mm Hg    ------------

Peak gradient, S                           44 mm Hg    ------------

Valve area, VTI                          0.75 cm^2     ------------

Valve area index, VTI                    0.38 cm^2/m^2 ------------

Valve area, Vmax                         0.81 cm^2     ------------

Valve area index, Vmax                   0.42 cm^2/m^2 ------------

Mitral valve

Peak E-wave velocity                     76.4 cm/s     ------------

Peak A-wave velocity                       69 cm/s     ------------

Deceleration time                         225 ms       150-230

Peak gradient, D                            2 mm Hg    ------------

Peak E/A ratio                            1.1          ------------

Tricuspid valve

Regurgitant peak velocity                 204 cm/s     ------------

Peak RV-RA gradient, S                     17 mm Hg    ------------

Maximal regurgitant velocity              204 cm/s     ------------

Systemic veins

Estimated CVP                               5 mm Hg    ------------

Right ventricle

RV pressure, S                             28 mm Hg    <30

Pulmonic valve

Peak velocity, S                         68.4 cm/s     ------------

 

LEGEND:

Mean values are shown as u=mean value.

Asterisk (*) marks values outside specified normal range.

Prepared and signed by

 

Rogelio Stiles

[id number]

## 2017-04-21 NOTE — TR
Date Performed: 04/20/2017       Time Performed: 13:04:30

 

DOCTOR:      Tyson Goodman 

 

DRUG LIST:     

CLINICAL HISTORY:     

REASON FOR TEST:      Angina

REASON FOR ENDING:     

OBSERVATION:     

CONCLUSION:      Lexiscan stress test was performed under standard four minute protocol.  Radionuclid
e was injected one minute prior to ending the test. No electrocardiographic abormalities were present
 to suggest ischemia. Nuclear imaging and interpretation are pending.

COMMENTS:

## 2017-05-20 ENCOUNTER — HOSPITAL ENCOUNTER (INPATIENT)
Dept: HOSPITAL 17 - NEPC | Age: 57
LOS: 9 days | Discharge: HOME | DRG: 882 | End: 2017-05-29
Attending: PSYCHIATRY & NEUROLOGY | Admitting: PSYCHIATRY & NEUROLOGY
Payer: MEDICARE

## 2017-05-20 VITALS
DIASTOLIC BLOOD PRESSURE: 81 MMHG | HEART RATE: 86 BPM | RESPIRATION RATE: 16 BRPM | SYSTOLIC BLOOD PRESSURE: 175 MMHG | OXYGEN SATURATION: 98 %

## 2017-05-20 VITALS — SYSTOLIC BLOOD PRESSURE: 118 MMHG | DIASTOLIC BLOOD PRESSURE: 69 MMHG | HEART RATE: 77 BPM | RESPIRATION RATE: 18 BRPM

## 2017-05-20 VITALS
OXYGEN SATURATION: 100 % | HEART RATE: 136 BPM | SYSTOLIC BLOOD PRESSURE: 168 MMHG | TEMPERATURE: 98.1 F | RESPIRATION RATE: 24 BRPM | DIASTOLIC BLOOD PRESSURE: 107 MMHG

## 2017-05-20 VITALS
OXYGEN SATURATION: 98 % | HEART RATE: 86 BPM | DIASTOLIC BLOOD PRESSURE: 104 MMHG | SYSTOLIC BLOOD PRESSURE: 197 MMHG | RESPIRATION RATE: 16 BRPM

## 2017-05-20 VITALS — HEIGHT: 70 IN | WEIGHT: 190.48 LBS | BODY MASS INDEX: 27.27 KG/M2

## 2017-05-20 VITALS
SYSTOLIC BLOOD PRESSURE: 147 MMHG | OXYGEN SATURATION: 96 % | DIASTOLIC BLOOD PRESSURE: 70 MMHG | HEART RATE: 73 BPM | RESPIRATION RATE: 18 BRPM | TEMPERATURE: 99.2 F

## 2017-05-20 DIAGNOSIS — R07.9: ICD-10-CM

## 2017-05-20 DIAGNOSIS — I35.0: ICD-10-CM

## 2017-05-20 DIAGNOSIS — F43.25: Primary | ICD-10-CM

## 2017-05-20 DIAGNOSIS — G89.29: ICD-10-CM

## 2017-05-20 DIAGNOSIS — M51.16: ICD-10-CM

## 2017-05-20 DIAGNOSIS — M25.561: ICD-10-CM

## 2017-05-20 DIAGNOSIS — M25.562: ICD-10-CM

## 2017-05-20 DIAGNOSIS — I10: ICD-10-CM

## 2017-05-20 DIAGNOSIS — I25.10: ICD-10-CM

## 2017-05-20 DIAGNOSIS — F17.210: ICD-10-CM

## 2017-05-20 DIAGNOSIS — Z98.1: ICD-10-CM

## 2017-05-20 DIAGNOSIS — Z76.5: ICD-10-CM

## 2017-05-20 LAB
ALP SERPL-CCNC: 74 U/L (ref 45–117)
ALT SERPL-CCNC: 15 U/L (ref 12–78)
AMPHETAMINE, URINE: (no result)
ANION GAP SERPL CALC-SCNC: 9 MEQ/L (ref 5–15)
AST SERPL-CCNC: 11 U/L (ref 15–37)
BARBITURATES, URINE: (no result)
BASOPHILS # BLD AUTO: 0 TH/MM3 (ref 0–0.2)
BASOPHILS NFR BLD: 0.3 % (ref 0–2)
BILIRUB SERPL-MCNC: 0.5 MG/DL (ref 0.2–1)
BUN SERPL-MCNC: 18 MG/DL (ref 7–18)
CHLORIDE SERPL-SCNC: 107 MEQ/L (ref 98–107)
COCAINE UR-MCNC: (no result) NG/ML
EOSINOPHIL # BLD: 0 TH/MM3 (ref 0–0.4)
EOSINOPHIL NFR BLD: 0.2 % (ref 0–4)
ERYTHROCYTE [DISTWIDTH] IN BLOOD BY AUTOMATED COUNT: 14.7 % (ref 11.6–17.2)
GFR SERPLBLD BASED ON 1.73 SQ M-ARVRAT: 68 ML/MIN (ref 89–?)
HCO3 BLD-SCNC: 25.6 MEQ/L (ref 21–32)
HCT VFR BLD CALC: 39.8 % (ref 39–51)
HEMO FLAGS: (no result)
LYMPHOCYTES # BLD AUTO: 0.9 TH/MM3 (ref 1–4.8)
LYMPHOCYTES NFR BLD AUTO: 12.5 % (ref 9–44)
MCH RBC QN AUTO: 30.1 PG (ref 27–34)
MCHC RBC AUTO-ENTMCNC: 33.7 % (ref 32–36)
MCV RBC AUTO: 89.3 FL (ref 80–100)
MONOCYTES NFR BLD: 7.2 % (ref 0–8)
NEUTROPHILS # BLD AUTO: 5.9 TH/MM3 (ref 1.8–7.7)
NEUTROPHILS NFR BLD AUTO: 79.8 % (ref 16–70)
PLATELET # BLD: 266 TH/MM3 (ref 150–450)
POTASSIUM SERPL-SCNC: 3.7 MEQ/L (ref 3.5–5.1)
RBC # BLD AUTO: 4.46 MIL/MM3 (ref 4.5–5.9)
SODIUM SERPL-SCNC: 142 MEQ/L (ref 136–145)
WBC # BLD AUTO: 7.4 TH/MM3 (ref 4–11)

## 2017-05-20 PROCEDURE — 72158 MRI LUMBAR SPINE W/O & W/DYE: CPT

## 2017-05-20 PROCEDURE — 80053 COMPREHEN METABOLIC PANEL: CPT

## 2017-05-20 PROCEDURE — 84484 ASSAY OF TROPONIN QUANT: CPT

## 2017-05-20 PROCEDURE — A9579 GAD-BASE MR CONTRAST NOS,1ML: HCPCS

## 2017-05-20 PROCEDURE — 93005 ELECTROCARDIOGRAM TRACING: CPT

## 2017-05-20 PROCEDURE — 73560 X-RAY EXAM OF KNEE 1 OR 2: CPT

## 2017-05-20 PROCEDURE — 80307 DRUG TEST PRSMV CHEM ANLYZR: CPT

## 2017-05-20 PROCEDURE — 85025 COMPLETE CBC W/AUTO DIFF WBC: CPT

## 2017-05-20 PROCEDURE — 72156 MRI NECK SPINE W/O & W/DYE: CPT

## 2017-05-20 PROCEDURE — 80061 LIPID PANEL: CPT

## 2017-05-20 PROCEDURE — 83036 HEMOGLOBIN GLYCOSYLATED A1C: CPT

## 2017-05-20 PROCEDURE — 71010: CPT

## 2017-05-20 PROCEDURE — 80048 BASIC METABOLIC PNL TOTAL CA: CPT

## 2017-05-20 NOTE — RADRPT
EXAM DATE/TIME:  05/20/2017 14:54 

 

[facility] COMPARISON:     

CHEST SINGLE AP, April 19, 2017, 18:40.

 

                     

INDICATIONS :     

Shortness of breath. 

                     

 

MEDICAL HISTORY :     

None.          

 

SURGICAL HISTORY :     

None.   

 

ENCOUNTER:     

Initial                                        

 

ACUITY:     

1 day      

PAIN SCORE:     0/10

LOCATION:      chest 

 

FINDINGS:     

Single AP view of the chest. The lungs are clear. Cardiomediastinal silhouette within normal limits. 
No evidence of pleural effusion or pneumothorax.

 

CONCLUSION:         No acute cardiopulmonary disease identified.

 

 

 

 Samuel De La Vega MD on May 20, 2017 at 15:24           

Board Certified Radiologist.

 This report was verified electronically.

## 2017-05-20 NOTE — PD
HPI


Chief Complaint:  Psychiatric Symptoms


Time Seen by Provider:  14:49


Travel History


International Travel<30 days:  No


Contact w/Intl Traveler<30days:  No


Traveled to known affect area:  No





History of Present Illness


HPI


Patient is a 56-year-old male with history of depression who presents emergency 

department complaint of suicidal ideation.  Patient states that he has been 

compliant with his Cymbalta, but it hasn't been helping and he's been feeling 

increasingly depressed.  States that he lost his mother recently which hasn't 

helped.  Has been anxious.  He has been taking his Xanax as prescribed.  

Patient states that he tried to step out in front of a moving vehicle several 

days ago in an attempt to end his life unsuccessfully.  He has thoughts that he 

may hang himself today, prompting ER visit.  Patient was tachycardic in triage.

  States that he feels anxious and wants help.  He denies any chest pain, 

shortness of breath, palpitations.  He has noticed a slight minimally 

productive cough over the last 2-3 days.  No fevers or chills.  History of 

aortic valve stenosis, mild to moderate not requiring any surgical 

intervention.  States that he had a cardiac catheter just this last week in 

[place] and per chart review had a stress test here in April that was negative.





PFSH


Past Medical History


Hx Anticoagulant Therapy:  No


Arthritis:  Yes


Bipolar Disorder:  Yes


Anxiety:  Yes


Depression:  Yes


Heart Rhythm Problems:  No


Cancer:  No


Cardiac Catheterization:  No


Cardiovascular Problems:  No


High Cholesterol:  No


Chemotherapy:  No


Congestive Heart Failure:  No


Cerebrovascular Accident:  No


Coronary Artery Disease:  Yes (Aortic Stenosis. Mother and Father also had)


Diabetes:  No


Diminished Hearing:  No


Endocrine:  No


Gastrointestinal Disorders:  No


Genitourinary:  Yes (URETHRAL BLEEDING; DISCOLORATION - BANGURA IN PLACE)


Hepatitis:  No


Hiatal Hernia:  No


Hypertension:  Yes


Immune Disorder:  No


Musculoskeletal:  Yes (chronic pain per pt)


Neurologic:  Yes (CERVICAL AND LUMBAR FUSIONS; NEUROPATHY LEFT ARM & LEG )


Psychiatric:  Yes (ANXIETY/MOOD DISORDER)


Reproductive:  No


Respiratory:  No


Thyroid Disease:  No





Past Surgical History


Abdominal Surgery:  No


Body Medical Devices:  PLATES & SCREWS NECK


Cardiac Surgery:  No


Coronary Artery Bypass Graft:  No


Ear Surgery:  No


Endocrine Surgery:  No


Eye Surgery:  No


Genitourinary Surgery:  Yes (cysto)


Neurologic Surgery:  Yes


Oral Surgery:  No


Thoracic Surgery:  No


Other Surgery:  Yes (HAND RECONSTRUCTION RIGHT )





Social History


Alcohol Use:  Yes (Rare)


Tobacco Use:  No


Substance Use:  No





Allergies-Medications


(Allergen,Severity, Reaction):  


Coded Allergies:  


     Morphine (Verified  Allergy, Unknown, swelling, 4/20/17)


     Phenobarbital (Verified  Allergy, Unknown, unknown, 4/20/17)


Reported Meds & Prescriptions





Reported Meds & Active Scripts


Active


Reported


Zestoretic (Lisinopril-Hctz) 20-12.5 Mg Tab 1 Tab PO DAILY


Zanaflex (Tizanidine HCl) 4 Mg Cap 4 Mg PO TID


Cymbalta DR (Duloxetine HCl) 60 Mg Capdr 60 Mg PO DAILY


Alprazolam 1 Mg Tab 1 Mg PO QID PRN








Review of Systems


Except as stated in HPI:  all other systems reviewed are Neg





Physical Exam


Narrative


GENERAL: Anxious appearing middle-aged male in no acute distress


SKIN: Focused skin assessment warm/dry.


HEAD: Normocephalic. 


EYES:  No scleral icterus. No injection or drainage. 


ENT:  Mucous membranes pink and moist.


NECK: Supple


CARDIOVASCULAR: Tachycardic initially with heart rate in the 130s, normalized 

upon recheck, regular rhythm.  No murmur appreciated.


RESPIRATORY: No accessory muscle use. Clear to auscultation. Breath sounds 

equal bilaterally. 


GASTROINTESTINAL: Abdomen soft, non-tender, nondistended.  Obese


MUSCULOSKELETAL: No obvious deformities. No edema. 


NEUROLOGICAL: Awake and alert. Motor grossly within normal limits. Normal 

speech.


PSYCHIATRIC: Anxious, blunted mood and affect with poor insight and judgment.  

Admits to suicidal ideation with plan.  No delusions, hallucinations, homicidal 

ideation





Data


Data


Last Documented VS





Vital Signs








  Date Time  Temp Pulse Resp B/P Pulse Ox O2 Delivery O2 Flow Rate FiO2


 


5/20/17 15:00   16     


 


5/20/17 14:33 98.1 136  168/107 100 Room Air  








Orders





 Complete Blood Count With Diff (5/20/17 14:50)


Comprehensive Metabolic Panel (5/20/17 14:50)


Electrocardiogram (5/20/17 14:50)


Psych Screen (5/20/17 14:50)


Drug Screen, Random Urine (5/20/17 14:50)


Chest, Single Ap (5/20/17 14:53)


Alprazolam (Xanax) (5/20/17 15:00)





Labs








 Laboratory Tests








Test 5/20/17





 15:30


 


White Blood Count 7.4 TH/MM3


 


Red Blood Count 4.46 MIL/MM3


 


Hemoglobin 13.4 GM/DL


 


Hematocrit 39.8 %


 


Mean Corpuscular Volume 89.3 FL


 


Mean Corpuscular Hemoglobin 30.1 PG


 


Mean Corpuscular Hemoglobin 33.7 %





Concent 


 


Red Cell Distribution Width 14.7 %


 


Platelet Count 266 TH/MM3


 


Mean Platelet Volume 9.2 FL


 


Neutrophils (%) (Auto) 79.8 %


 


Lymphocytes (%) (Auto) 12.5 %


 


Monocytes (%) (Auto) 7.2 %


 


Eosinophils (%) (Auto) 0.2 %


 


Basophils (%) (Auto) 0.3 %


 


Neutrophils # (Auto) 5.9 TH/MM3


 


Lymphocytes # (Auto) 0.9 TH/MM3


 


Monocytes # (Auto) 0.5 TH/MM3


 


Eosinophils # (Auto) 0.0 TH/MM3


 


Basophils # (Auto) 0.0 TH/MM3


 


CBC Comment DIFF FINAL 


 


Differential Comment  


 


Sodium Level 142 MEQ/L


 


Potassium Level 3.7 MEQ/L


 


Chloride Level 107 MEQ/L


 


Carbon Dioxide Level 25.6 MEQ/L


 


Anion Gap 9 MEQ/L


 


Blood Urea Nitrogen 18 MG/DL


 


Creatinine 1.11 MG/DL


 


Estimat Glomerular Filtration 68 ML/MIN





Rate 


 


Random Glucose 90 MG/DL


 


Calcium Level 9.5 MG/DL


 


Total Bilirubin 0.5 MG/DL


 


Aspartate Amino Transf 11 U/L





(AST/SGOT) 


 


Alanine Aminotransferase 15 U/L





(ALT/SGPT) 


 


Alkaline Phosphatase 74 U/L


 


Total Protein 7.4 GM/DL


 


Albumin 3.7 GM/DL














[facility]


Medical Decision Making


Medical Screen Exam Complete:  Yes


Emergency Medical Condition:  Yes


Medical Record Reviewed:  Yes


Differential Diagnosis


56-year-old male with history of mild to moderate aortic stenosis, depression 

and bipolar disorder here with increasing depression and suicidal ideation with 

plan over the course the last several days to week.  Differential includes 

depression, bipolar disorder, adjustment reaction, anxiety.  Notably 

tachycardic initially, the normalized upon recheck.  My suspicion is this is 

his underlying anxiety.  Arrhythmia is on the differential as is electrolyte 

abnormality are symptomatic anemia.  His minimal cough, with clear lungs on 

exam likely viral URI with a Will obtain chest x-ray to rule out pneumonia.


Narrative Course


Patient placed on monitor, IV established and blood obtained.  A twelve-lead 

EKG showed sinus rhythm with sinus arrhythmia, rate 84.  CBC, CMP unremarkable.

  Urine drug screen remains pending..  Portable chest x-ray obtained that by my 

read shows no acute abnormalities.  Patient given dose of Xanax for his anxiety 

while here.  Patient medically cleared for psychiatric evaluation.





Diagnosis





 Primary Impression:  


 Depression


 Qualified Code:  F32.9 - Depression, unspecified depression type


 Additional Impression:  


 Suicidal ideation








Dana Worthy MD May 20, 2017 14:57

## 2017-05-21 VITALS
HEART RATE: 67 BPM | OXYGEN SATURATION: 98 % | DIASTOLIC BLOOD PRESSURE: 67 MMHG | RESPIRATION RATE: 18 BRPM | SYSTOLIC BLOOD PRESSURE: 126 MMHG

## 2017-05-21 VITALS
HEART RATE: 64 BPM | RESPIRATION RATE: 16 BRPM | SYSTOLIC BLOOD PRESSURE: 159 MMHG | TEMPERATURE: 98.1 F | OXYGEN SATURATION: 98 % | DIASTOLIC BLOOD PRESSURE: 96 MMHG

## 2017-05-21 VITALS
SYSTOLIC BLOOD PRESSURE: 150 MMHG | DIASTOLIC BLOOD PRESSURE: 82 MMHG | TEMPERATURE: 97.4 F | RESPIRATION RATE: 18 BRPM | HEART RATE: 68 BPM | OXYGEN SATURATION: 96 %

## 2017-05-21 VITALS — HEART RATE: 55 BPM | DIASTOLIC BLOOD PRESSURE: 61 MMHG | RESPIRATION RATE: 18 BRPM | SYSTOLIC BLOOD PRESSURE: 118 MMHG

## 2017-05-21 VITALS
OXYGEN SATURATION: 97 % | RESPIRATION RATE: 18 BRPM | HEART RATE: 72 BPM | SYSTOLIC BLOOD PRESSURE: 148 MMHG | TEMPERATURE: 96.3 F | DIASTOLIC BLOOD PRESSURE: 68 MMHG

## 2017-05-21 VITALS
SYSTOLIC BLOOD PRESSURE: 159 MMHG | TEMPERATURE: 98.1 F | OXYGEN SATURATION: 98 % | DIASTOLIC BLOOD PRESSURE: 96 MMHG | RESPIRATION RATE: 16 BRPM | HEART RATE: 64 BPM

## 2017-05-21 RX ADMIN — ACETAMINOPHEN PRN MG: 325 TABLET ORAL at 17:11

## 2017-05-21 RX ADMIN — NICOTINE SCH PATCH: 21 PATCH, EXTENDED RELEASE TOPICAL at 16:30

## 2017-05-21 NOTE — EKG
Date Performed: 05/20/2017       Time Performed: 15:05:34

 

PTAGE:      56 years

 

EKG:      Sinus rhythm 

 

 WITH SINUS ARRHYTHMIA NONSPECIFIC T-WAVE ABNORMALITY Compared to previous tracing, the patient is no
 longer bradycardic BORDERLINE ECG

 

PREVIOUS TRACING       : 04/20/2017 00.52

 

DOCTOR:   Kaitlynn Bojorquez  Interpretating Date/Time  05/21/2017 16:43:22

## 2017-05-22 VITALS
HEART RATE: 84 BPM | DIASTOLIC BLOOD PRESSURE: 104 MMHG | SYSTOLIC BLOOD PRESSURE: 142 MMHG | OXYGEN SATURATION: 97 % | RESPIRATION RATE: 18 BRPM | TEMPERATURE: 98 F

## 2017-05-22 VITALS
OXYGEN SATURATION: 98 % | RESPIRATION RATE: 18 BRPM | HEART RATE: 81 BPM | DIASTOLIC BLOOD PRESSURE: 82 MMHG | TEMPERATURE: 96.6 F | SYSTOLIC BLOOD PRESSURE: 126 MMHG

## 2017-05-22 LAB
ANION GAP SERPL CALC-SCNC: 7 MEQ/L (ref 5–15)
BUN SERPL-MCNC: 15 MG/DL (ref 7–18)
CHLORIDE SERPL-SCNC: 106 MEQ/L (ref 98–107)
GFR SERPLBLD BASED ON 1.73 SQ M-ARVRAT: 70 ML/MIN (ref 89–?)
HCO3 BLD-SCNC: 28 MEQ/L (ref 21–32)
HDLC SERPL-MCNC: 50.8 MG/DL (ref 40–60)
HEMOGLOBIN A1A: 1.1 %
HEMOGLOBIN A1B: 1.6 %
HEMOGLOBIN AO: 85.5 %
HEMOGLOBIN LA1C: 2.1 %
HEMOGLOBIN P3: 5.2 %
LDLC SERPL-MCNC: 135 MG/DL (ref 0–99)
POTASSIUM SERPL-SCNC: 3.5 MEQ/L (ref 3.5–5.1)
SODIUM SERPL-SCNC: 141 MEQ/L (ref 136–145)

## 2017-05-22 RX ADMIN — NICOTINE SCH PATCH: 21 PATCH, EXTENDED RELEASE TOPICAL at 08:51

## 2017-05-22 RX ADMIN — ACETAMINOPHEN PRN MG: 325 TABLET ORAL at 02:24

## 2017-05-22 NOTE — HHI.HP
Provisional Diagnosis


Admission Date


May 21, 2017 at 16:11


Axis I.


Adjustment disorder with mixed disturbance of emotions and conduct.





                               Certification of Person's Competence 


                           To Provide Express and Informed Consent





I have personally examined Cirilo Bolden , a person being served at 

[facility] on, May 22, 2017 17:59.


Express and informed consent means consent voluntarily given in writing, by a 

competent person, after sufficient explanation and disclosure of the subject 

matter involved to enable the person to make a knowing and willful decision 

without any element of force, fraud, deceit, duress, or other form of 

constraint or coercion.





This person is 18 years of age or older, is not now known to be incompetent to 

consent to treatment with a guardian advocate, and does not have a health care 

surrogate or proxy currently making medical treatment decisions.  I have found 

this person to be one of the following:





[X] Competent to provide express and informed consent, as defined above, for 

voluntary admission to this facility and is competent to provide express and 

informed consent for treatment.  He/she has the consistent capacity to make 

well reasoned, willful, and knowing decisions concerning his or her medical or 

mental health treatment.  The person fully and consistently understands the 

purpose of the admission for examination/placement and is fully capable of 

personally exercising all rights [place]





[] Incompetent to provide express and informed consent to voluntary admission, 

and this is incompetent to provide express and informed consent to treatment.  

The person must be transferred to involuntary status and a petition for a 

guardian advocate filed with the Circuit Court.





[] Refusing to provide express and informed consent to voluntary admission but 

is competent to provide express and informed consent for treatment.  The person 

must be discharged or transferred to involuntary status.





Form shall be completed within 24 hours of a person's arrival at the receiving 

facility and filed in the clinical record of each person:


1. Admitted on a voluntary basis


2. Permitted to provide express and informed consent to his/her own treatment


3. Allowed to transfer from involuntary to voluntary status


4. Prior to permitting a person to consent to his or her own treatment after 

having been previously found incompetent to consent to treatment.





History of Present Illness


Capacity:  Has Capacity


HPI


56-year-old male with a multiyear history of pain complaints secondary to 

lumbar fusion, presents to the emergency room voluntarily for admission due to 

suicidal ideation with plan and attempt.  Patient apparently stepped out in 

front of traffic 2 or 3 days ago and continues to think about committing 

suicide.  Reports an additional stressor of his mother having  recently.  

Describes a 2+ week history of depressed mood, anhedonia, suicidal ideation, 

anxiety, social withdrawal, diminished self-esteem, poor sleeping, diminished 

energy, problems with concentration, etc.  Patient states he has been compliant 

with his Cymbalta and he has a history of taking Xanax, that his medicines are 

not working for him.  At the time of this admission, the patient is found to be 

tremulous and anxious as well as depressed and in pain.





Review of Systems


Except as stated in HPI:  all other systems reviewed are Neg


Musculoskeletal:  COMPLAINS OF: Joint pain, Back pain





Past Psych History


Psychological trauma history


Denied


Violence risk - others (6 mos)


Minimal to moderate


Violence risk - self (6 mos)


Moderate





Substance Abuse History


Drugs/Alcohol past 12 months


History of medication abuse.





Past Family Social History


Coded Allergies:  


     Phenobarbital (Verified  Allergy, Unknown, unknown, 17)


Reported Medications


Oxycodone-Acetaminophen (Percocet) mg Tab1 Tab PO Q6H PRN (PAIN)  Ref 0


   17


Morphine ER (Ms Contin)30 Mg Tab30 Mg PO TID   Ref 0


   17


Lisinopril-Hctz (Zestoretic)20-12.5 Mg Tab1 Tab PO DAILY  #30 TAB  Ref 0


   17


Tizanidine (Zanaflex)4 Mg Cap4 Mg PO TID   Ref 0


   17


Duloxetine DR (Cymbalta DR)60 Mg Capdr60 Mg PO DAILY  #30 CAP  Ref 0


   17


Alprazolam 1 Mg Tab1 Mg PO QID PRN (ANXIETY)  Ref 0


   17





 Current Medications








 Medications


  (Trade)  Dose


 Ordered  Sig/Lashawn


 Route  Start Time


 Stop Time Status Last Admin


 


  (Tylenol)  650 mg  Q4H  PRN


 PO  17 16:15


    17 02:24


 


 


  (Milk Of


 Magnesia Liq)  30 ml  DAILY  PRN


 PO  17 16:15


     


 


 


  (Mag-Al Plus


 Susp Liq)  30 ml  Q6H  PRN


 PO  17 16:15


     


 


 


  (Habitrol 21 Mg


 Patch.24 Hr)  1 patch  DAILY


 T-DERMAL  17 16:30


     


 


 


  (Ativan)  1 mg  Q6H  PRN


 PO  17 16:15


    17 15:04


 


 


  (Ativan Inj)  1 mg  Q6H  PRN


 IM  17 16:15


     


 


 


 Miscellaneous


 Information  1  DAILY


 T-DERMAL  17 09:00


     


 








Family History


Significant for mood and anxiety disorders.


Social History


Unemployed.  Receives Social Security disability.  Limited support system not 

adequately treated for his physical problems.


Patient's Strengths (min. 2)


Verbal and has access to healthcare.





Physical Exam


GENERAL: 


SKIN: Warm and dry.


HEAD: Normocephalic.


EYES: No scleral icterus. No injection or drainage. 


NECK: Supple, trachea midline. No JVD or lymphadenopathy.


CARDIOVASCULAR: Regular rate and rhythm without murmurs, gallops, or rubs. 


RESPIRATORY: Breath sounds equal bilaterally. No accessory muscle use.


GASTROINTESTINAL: Abdomen soft, non-tender, nondistended. 


MUSCULOSKELETAL: No cyanosis, or edema. 


BACK: Nontender without obvious deformity. No CVA tenderness.





Vital Signs





 Vital Signs








  Date Time  Temp Pulse Resp B/P Pulse Ox O2 Delivery O2 Flow Rate FiO2


 


17 05:49 96.6 81 18 126/82 98   


 


17 14:10      Room Air  











Mental Status Examination


Speech:  Unremarkable


Orientation:  x3


Memory:  Unremarkable


Thought Process:  Organized, Goal Directed


Thought Content:  Unremarkable


Hallucination Type:  None


Attention and Concentration:  Good


Suicidal Ideation:  Yes


Previous Suicide Attempts:  Yes


Homicidal Ideation:  No


Previous Homicide Attempts:  No


Insight:  Fair


Judgment:  Unrealistic


Affect:  Anxious, Sad


Mood:  Sad, Anxious


Motor Activity:  Normal gait





Assessment & Plan


Problem List:  


(1) Adjustment disorder with mixed disturbance of emotions and conduct


ICD Code:  F43.25


Assessment & Plan


Estimated LOS: 5 days 56-year-old male with significant risk for danger to self 

by suicide.  Patient has 2 stressors including chronic pain and the loss of his 

mother.  He is being treated with antidepressant therapy but it is not working.

  He is likely withdrawing from pain medicines and benzodiazepines which needs 

to be addressed.  He could have a seizure as a result of this withdrawal.  

Additionally, the patient will receive a hospitalist consult to address his 

ongoing pain complaints.  He will be placed on Klonopin to address his 

benzodiazepine withdrawal and anxiety.  We will reevaluate his antidepressant 

medicines for augmentation with mood stabilizers.  It is anticipated he'll be 

in the hospital for up to a week.  This physician spoke to the nurse about his 

behavior this morning, which apparently was problematic.  This physician also 

plans to speak to the  to obtain extra information from family about 

dealing with the patient's mood and emotional dyscontrol.  Will obtain EKG to 

determine his ability to tolerate further antidepressant and mood stabilizing 

medicines.








Tyson Catherine MD May 22, 2017 18:06

## 2017-05-23 VITALS
OXYGEN SATURATION: 99 % | TEMPERATURE: 97.3 F | RESPIRATION RATE: 18 BRPM | DIASTOLIC BLOOD PRESSURE: 95 MMHG | HEART RATE: 74 BPM | SYSTOLIC BLOOD PRESSURE: 141 MMHG

## 2017-05-23 VITALS
OXYGEN SATURATION: 96 % | HEART RATE: 78 BPM | TEMPERATURE: 98.6 F | DIASTOLIC BLOOD PRESSURE: 105 MMHG | SYSTOLIC BLOOD PRESSURE: 145 MMHG | RESPIRATION RATE: 17 BRPM

## 2017-05-23 VITALS
TEMPERATURE: 97 F | DIASTOLIC BLOOD PRESSURE: 57 MMHG | SYSTOLIC BLOOD PRESSURE: 103 MMHG | HEART RATE: 82 BPM | RESPIRATION RATE: 17 BRPM | OXYGEN SATURATION: 98 %

## 2017-05-23 VITALS
TEMPERATURE: 97.9 F | OXYGEN SATURATION: 97 % | DIASTOLIC BLOOD PRESSURE: 97 MMHG | SYSTOLIC BLOOD PRESSURE: 127 MMHG | RESPIRATION RATE: 17 BRPM | HEART RATE: 90 BPM

## 2017-05-23 RX ADMIN — ACETAMINOPHEN PRN MG: 325 TABLET ORAL at 09:40

## 2017-05-23 RX ADMIN — NICOTINE SCH PATCH: 21 PATCH, EXTENDED RELEASE TOPICAL at 08:37

## 2017-05-23 RX ADMIN — OXYCODONE HYDROCHLORIDE AND ACETAMINOPHEN PRN TAB: 5; 325 TABLET ORAL at 20:00

## 2017-05-23 RX ADMIN — ACETAMINOPHEN PRN MG: 325 TABLET ORAL at 03:06

## 2017-05-23 RX ADMIN — ACETAMINOPHEN PRN MG: 325 TABLET ORAL at 14:08

## 2017-05-23 RX ADMIN — MORPHINE SULFATE SCH MG: 30 TABLET, EXTENDED RELEASE ORAL at 21:00

## 2017-05-23 NOTE — HHI.PYPN
Subjective


Remarks


Continues to report symptoms of pain, irritability and depression.  Fairly 

cooperative.





Review of Systems


Except as stated in HPI:  all other systems reviewed are Neg





Objective


Alert:  Yes


Hannah:  Person, Place, Date, Situation


Mood:  Calm


Affect:  Euthymic


Memory Intact:  Immediate, Recent, Remote


Hallucinations:  Other


Delusions:  No


Delusion Type:  Other


Suicidal:  Ideation


Homicidal:  Ideation


Insight/Judgment


Impaired


Labs











Test 5/23/17 5/23/17





 05:32 14:23


 


Troponin I LESS THAN 0.02 LESS THAN 0.02





 NG/ML NG/ML








Vitals/IOs





 Vital Signs








  Date Time  Temp Pulse Resp B/P Pulse Ox O2 Delivery O2 Flow Rate FiO2


 


5/23/17 05:51 97.3 74 18 141/95 99   


 


5/21/17 14:10      Room Air  











Assessment & Plan


Problem List:  


(1) Adjustment disorder with mixed disturbance of emotions and conduct


ICD Code:  F43.25


Assessment & Plan


Estimated LOS:  3 days continues to require mood stabilization with medication 

adjustment and evaluation.


Justification for Cont. Inpt.


Likely to decompensate at lower level of care.








Tyson Catherine MD May 23, 2017 16:02

## 2017-05-23 NOTE — EKG
Date Performed: 05/23/2017       Time Performed: 17:17:12

 

PTAGE:      56 years

 

EKG:      Sinus rhythm 

 

 NONSPECIFIC T-WAVE ABNORMALITY BORDERLINE ECG

 

PREVIOUS TRACING       : 05/23/2017 10.06 Compared to prior tracing no significant change

 

DOCTOR:   Radames Roy  Interpretating Date/Time  05/23/2017 19:44:36

## 2017-05-23 NOTE — PD.CONS
HPI


Service


[facility]


Consult Requested By


Psychiatric services


Reason for Consult


Medical management


Primary Care Physician


No Primary Care Physician


Diagnoses:  


History of Present Illness


Written by Bre Blake PA-C acting as scribe for Dr. Esqueda on 5/23/17 

at 13:30.








This is a 55 yo male with past medical history of previous hypertension, aortic 

valve stenosis, depression, anxiety and chronic neck and low back pain s/p 

cervical fusion x 3 and lumbar fusion who was admitted to the psychiatric unit 

due to suicidal ideation with plan and attempt.  Hospitalist services were 

consulted for medical management.  Patient endorses his BP has been running 

high because of uncontrolled pain and his BP meds not being restarted since his 

admission.  He sees a pain management physician monthly and receives 

prescriptions for Percocet 10/325mg q 8h and MS Contin 30mg po TID.  He has not 

received any pain medication for the past 4 days.  He reports burning numb 

sensation in both hands as well as weakness in the left hand and left leg.  Per 

nursing staff, he had some chest pain last night and early this morning but 

this has resolved.  He had EKG with nonspecific findings and troponins which 

has been negative x 2.





Review of Systems


Except as stated in HPI:  all other systems reviewed are Neg





Past Family Social History


Allergies:  


Coded Allergies:  


     Phenobarbital (Verified  Allergy, Unknown, unknown, 5/20/17)


Past Medical History


Hypertension 


Mild to moderate aortic valve stenosis s/p Echo 4/2017


Previous cardiac catheterization approximately 8 years ago with mild blockages 

per patient report


Chronic low back pain secondary to degenerative disc disease status post 

previous cervical fusion x 3 and lumbar fusion


Neuropathy


Depression


Anxiety


Past Surgical History


Cervical fusion x 3


Lumbar fusion


Right hand surgery 


Knee surgery x 3


Reported Medications


Oxycodone-Acetaminophen (Percocet) mg Tab1 Tab PO Q6H PRN (PAIN)  Ref 0


   5/20/17


Morphine ER (Ms Contin)30 Mg Tab30 Mg PO TID   Ref 0


   5/20/17


Lisinopril-Hctz (Zestoretic)20-12.5 Mg Tab1 Tab PO DAILY  #30 TAB  Ref 0


   4/20/17


Tizanidine (Zanaflex)4 Mg Cap4 Mg PO TID   Ref 0


   4/20/17


Duloxetine DR (Cymbalta DR)60 Mg Capdr60 Mg PO DAILY  #30 CAP  Ref 0


   4/20/17


Alprazolam 1 Mg Tab1 Mg PO QID PRN (ANXIETY)  Ref 0


   4/20/17


Active Ordered Medications





 Current Medications








 Medications


  (Trade)  Dose


 Ordered  Sig/Lashawn


 Route  Start Time


 Stop Time Status Last Admin


 


  (Tylenol)  650 mg  Q4H  PRN


 PO  5/21/17 16:15


    5/23/17 09:40


 


 


  (Milk Of


 Magnesia Liq)  30 ml  DAILY  PRN


 PO  5/21/17 16:15


     


 


 


  (Mag-Al Plus


 Susp Liq)  30 ml  Q6H  PRN


 PO  5/21/17 16:15


     


 


 


  (Habitrol 21 Mg


 Patch.24 Hr)  1 patch  DAILY


 T-DERMAL  5/21/17 16:30


     


 


 


  (Ativan)  1 mg  Q6H  PRN


 PO  5/21/17 16:15


    5/23/17 09:41


 


 


  (Ativan Inj)  1 mg  Q6H  PRN


 IM  5/21/17 16:15


     


 


 


 Miscellaneous


 Information  1  DAILY


 T-DERMAL  5/22/17 09:00


     


 


 


  (KlonoPIN)  2 mg  Q12HR


 PO  5/22/17 21:00


    5/23/17 08:11


 


 


  (Desyrel)  300 mg  HS


 PO  5/22/17 21:00


    5/22/17 20:15


 








Family History


Mother and Father, CAD


Social History


Patient reports tobacco use of 1pack per week.


Occasional EtOH use once every 3-4 months.


He reports marijuana use "as a kid" but denies any recent use.





Physical Exam


Vital Signs





 Vital Signs








  Date Time  Temp Pulse Resp B/P Pulse Ox O2 Delivery O2 Flow Rate FiO2


 


5/23/17 05:51 97.3 74 18 141/95 99   


 


5/23/17 04:30 98.6 78 17 145/105 96   


 


5/23/17 03:00 97.9 90 17 127/97 97   


 


5/22/17 18:58 98.0 84 18 142/104 97   








Physical Exam


GENERAL: This is a well-nourished, well-developed patient, in no apparent 

distress.  Awake and alert.


SKIN: No rashes, ecchymoses or lesions. Cool and dry.


HEAD: Atraumatic. Normocephalic. No temporal or scalp tenderness.


EYES: Pupils equal round and reactive. Extraocular motions intact. No scleral 

icterus. No injection or drainage. 


ENT: Nose without bleeding, purulent drainage or septal hematoma. Throat 

without erythema, tonsillar hypertrophy or exudate. Uvula midline. Airway 

patent.


NECK: Trachea midline. No lymphadenopathy. Supple, nontender, no meningeal 

signs.


CARDIOVASCULAR: Regular rate and rhythm.  (+)3/6 holosystolic murmur radiating 

into the carotids.


RESPIRATORY: Clear to auscultation. Breath sounds equal bilaterally. No wheezes

, rales, or rhonchi.  


GASTROINTESTINAL: Abdomen soft, non-tender, nondistended. No hepato-splenomegaly

, or palpable masses. No guarding.


MUSCULOSKELETAL: Extremities without clubbing, cyanosis, or edema. No joint 

tenderness, effusion, or edema noted. No calf tenderness. 


NEUROLOGICAL: Awake and alert. Able to move all extremities.  Weakness LUE and 

LLE.  (+)weak left  strength.  Normal speech.


Laboratory





Laboratory Tests








Test 5/23/17





 05:32


 


Troponin I LESS THAN 0.02 








Imaging





Last Impressions








Chest X-Ray 5/20/17 1453 Signed





Impressions: 





 Service Date/Time:  Saturday, May 20, 2017 14:54 - CONCLUSION: No acute 





 cardiopulmonary disease identified.     Samuel De La Vega MD 











Assessment and Plan


Assessment and Plan


55 yo male with past medical history of previous hypertension, aortic valve 

stenosis, depression, anxiety and chronic neck and low back pain s/p cervical 

fusion x 3 and lumbar fusion who was admitted to the psychiatric unit due to 

suicidal ideation with plan and attempt.  Hospitalist services were consulted 

for medical management.





Depression/Anxiety/Suicide Attempt


   - Management of her psychiatric team





Hypertension/CAD


   - stress test done 4/20/17 without e/o ischemia


   - /95


   - Resume home antihypertensive medications


   - Monitor BP and adjust treatment accordingly





Aortic valve stenosis


   - Per review of medical record, patient had previous echocardiogram done 04/ 20/17 showing EF 55-65% with no motion abnormalities and mild to moderate 

aortic valve stenosis





Chronic neck and low back pain s/p previous cervical and lumbar fusions with 

weak LUE and LLE as well as burning numb sensation in hands


   - patient reports dropping objects from left hand


   - cervical surgeries done decades ago without any recent follow up


   - MRI Cervical and Lumbar spine ordered for further evaluation


   - confirmed medication regimen thru [facility] and will resume pain med regimen





Episode of chest pain last night and early this am per nursing staff


   - now resolved


   - Personally reviewed EKG revealing NSR and nonspecific T wave findings


   - troponins negative x 2


   - will continue to monitor for recurrence





Ongoing tobacco use


   - Nicotine patch


   - discussed smoking cessation/counseling offered





DVT prophylaxis


   - Encourage ambulation





Attending Statement


This note was transcribed by diamante Blake.  I, Dr. Edgar Gleason personally performed the history, physical exam, and medical 

decision making; and confirmed the accuracy of the information in the 

transcribed note.





Authenticated by Dr. Edgar Gleason on 5/23/17 at 13:30.








Bre Blake May 23, 2017 13:38


Edgar Branch MD Jun 1, 2017 13:14

## 2017-05-23 NOTE — EKG
Date Performed: 05/23/2017       Time Performed: 05:24:04

 

PTAGE:      56 years

 

EKG:      Sinus rhythm 

 

 NONSPECIFIC T-WAVE ABNORMALITY BORDERLINE ECG Compared to prior tracing no significant change 

 

 PREVIOUS TRACING             5/20/2017 15.05.34

 

DOCTOR:   Lissette Girard  Interpretating Date/Time  05/23/2017 15:40:13

## 2017-05-24 VITALS
SYSTOLIC BLOOD PRESSURE: 134 MMHG | TEMPERATURE: 97.4 F | RESPIRATION RATE: 16 BRPM | DIASTOLIC BLOOD PRESSURE: 82 MMHG | OXYGEN SATURATION: 100 % | HEART RATE: 55 BPM

## 2017-05-24 VITALS
RESPIRATION RATE: 18 BRPM | SYSTOLIC BLOOD PRESSURE: 143 MMHG | OXYGEN SATURATION: 98 % | TEMPERATURE: 97.8 F | HEART RATE: 70 BPM | DIASTOLIC BLOOD PRESSURE: 85 MMHG

## 2017-05-24 RX ADMIN — MORPHINE SULFATE SCH MG: 30 TABLET, EXTENDED RELEASE ORAL at 21:13

## 2017-05-24 RX ADMIN — OXYCODONE HYDROCHLORIDE AND ACETAMINOPHEN PRN TAB: 5; 325 TABLET ORAL at 02:06

## 2017-05-24 RX ADMIN — NICOTINE SCH PATCH: 21 PATCH, EXTENDED RELEASE TOPICAL at 08:33

## 2017-05-24 RX ADMIN — OXYCODONE HYDROCHLORIDE AND ACETAMINOPHEN PRN TAB: 5; 325 TABLET ORAL at 10:02

## 2017-05-24 RX ADMIN — MORPHINE SULFATE SCH MG: 30 TABLET, EXTENDED RELEASE ORAL at 08:32

## 2017-05-24 RX ADMIN — DULOXETINE SCH MG: 60 CAPSULE, DELAYED RELEASE ORAL at 21:13

## 2017-05-24 RX ADMIN — OXYCODONE HYDROCHLORIDE AND ACETAMINOPHEN PRN TAB: 5; 325 TABLET ORAL at 18:03

## 2017-05-24 NOTE — RADRPT
EXAM DATE/TIME:  05/24/2017 13:01 

 

[facility] COMPARISON:     

No previous studies available for comparison.

       

 

 

INDICATIONS :     

Left leg pain.

                     

 

CONTRAST:     

17 cc Omniscan (gadodiamide) IV

                     

 

MEDICAL HISTORY :     

None.     

 

SURGICAL HISTORY :     

Fusion, lumbar. Fusion, cervical.   rt. knee surgery

 

ENCOUNTER:     

Subsequent

 

ACUITY:     

3 day

 

PAIN SCORE:     

3/10

 

LOCATION:     

Left   leg

 

TECHNIQUE:     

Multiplanar multisequence MRI of the lumbar spine was performed with and without contrast.

 

FINDINGS:     

The most caudal appearing lumbar vertebra is numbered as L5.

 

VERTEBRAE:     

There are mild degenerative marrow signal changes in the lumbar spine, most conspicuously adjacent to
 the L1-2 and L4-5 intervertebral discs. No suspicious marrow signal abnormalities. Minimal scoliotic
 curvature. No significant spondylolisthesis.

 

CONUS:     

Normal level and configuration.

 

POST CONTRAST:     

No abnormal areas of contrast enhancement are seen.

 

T12-L1: 

There is slight annular disc bulge. Minimal broad superimposed dorsal protrusion without significant 
canal or foraminal compromise.

 

L1-L2: 

Annular disc bulge with mild broad undulating superimposed dorsal protrusion, mildly asymmetric to th
e right with mild lateral recess and foraminal stenosis. Minimal canal compromise.

 

L2-L3: 

Annular disc bulge with mild broad superimposed dorsal disc protrusion, eccentric to the right with m
ild asymmetric right-sided lateral recess effacement and mild canal compromise.

 

L3-L4:  

The thecal sac has a normal diameter.  No evidence of disc bulge or protrusion.  The neural foramina 
are patent bilaterally.

 

L4-L5: 

Annular disc bulge with broad primarily left lateral disc osteophyte. Small rounded focus in the left
 lateral recess and proximal neural foramen has appearance of a small disc fragment measuring just ov
er a centimeter in diameter. This produces severe compromise of the medial aspect of the left L4 neur
al foramen. The canal is adequate

 

L5-S1:  

The thecal sac has a normal diameter.  No evidence of disc bulge or protrusion.  The neural foramina 
are patent bilaterally. Mild bilateral posterior facet arthropathy.

 

CONCLUSION:     

Multilevel disc abnormalities. The presumed disc fragment in a left paracentral location at L4-5 may 
account for left leg radicular symptoms. Correlation recommended. Acentrically right-sided findings a
t the L1-2 and L2-3 levels as described

 

 

 

 Giles Eli MD on May 24, 2017 at 14:32           

Board Certified Radiologist.

 This report was verified electronically.

## 2017-05-24 NOTE — HHI.PR
Subjective


Remarks


Patient c/o neck and back pain


denies chest pain


vital signs stable





Objective


Vitals





 Vital Signs








  Date Time  Temp Pulse Resp B/P Pulse Ox O2 Delivery O2 Flow Rate FiO2


 


5/24/17 17:20 97.8 70 18 143/85 98   


 


5/24/17 06:07 97.4 55 16 134/82 100   








Result Diagram:  


5/20/17 1530                                                                   

             5/22/17 0704





Imaging





Last Impressions








Lumbar Spine MRI 5/24/17 0000 Signed





Impressions: 





 Service Date/Time:  Wednesday, May 24, 2017 13:01 - CONCLUSION:  Multilevel 

disc 





 abnormalities. The presumed disc fragment in a left paracentral location at L4-

5 





 may account for left leg radicular symptoms. Correlation recommended. 





 Acentrically right-sided findings at the L1-2 and L2-3 levels as described     





 Giles Eli MD 


 


Cervical Spine MRI 5/24/17 0000 Signed





Impressions: 





 Service Date/Time:  Wednesday, May 24, 2017 13:01 - CONCLUSION:  Previous 





 multilevel cervical fusion. Mild disc abnormalities above the previous fusion 





 with asymmetrically right-sided foraminal compromise at C3-4. No specific 





 explanation for left upper extremity radicular symptoms.     Giles Eli MD 


 


Chest X-Ray 5/20/17 1453 Signed





Impressions: 





 Service Date/Time:  Saturday, May 20, 2017 14:54 - CONCLUSION: No acute 





 cardiopulmonary disease identified.     Samuel De La Vega MD 








Objective Remarks


GENERAL: This is a well-nourished, well-developed patient, in no apparent 

distress.  Awake and alert.


SKIN: No rashes, ecchymoses or lesions. Cool and dry.


HEAD: Atraumatic. Normocephalic. No temporal or scalp tenderness.


EYES: Pupils equal round and reactive. Extraocular motions intact. No scleral 

icterus. No injection or drainage. 


ENT: Nose without bleeding, purulent drainage or septal hematoma. Throat 

without erythema, tonsillar hypertrophy or exudate. Uvula midline. Airway 

patent.


NECK: Trachea midline. No lymphadenopathy. Supple, nontender, no meningeal 

signs.


CARDIOVASCULAR: Regular rate and rhythm.  (+)3/6 holosystolic murmur radiating 

into the carotids.


RESPIRATORY: Clear to auscultation. Breath sounds equal bilaterally. No wheezes

, rales, or rhonchi.  


GASTROINTESTINAL: Abdomen soft, non-tender, nondistended. No hepato-splenomegaly

, or palpable masses. No guarding.


MUSCULOSKELETAL: Extremities without clubbing, cyanosis, or edema. No joint 

tenderness, effusion, or edema noted. No calf tenderness. 


NEUROLOGICAL: Awake and alert. Able to move all extremities.  Weakness LUE and 

LLE.  (+)weak left  strength.  Normal speech.


Medications and IVs





 Current Medications








 Medications


  (Trade)  Dose


 Ordered  Sig/Lashawn


 Route  Start Time


 Stop Time Status Last Admin


 


  (Tylenol)  650 mg  Q4H  PRN


 PO  5/21/17 16:15


    5/23/17 14:08


 


 


  (Milk Of


 Magnesia Liq)  30 ml  DAILY  PRN


 PO  5/21/17 16:15


     


 


 


  (Mag-Al Plus


 Susp Liq)  30 ml  Q6H  PRN


 PO  5/21/17 16:15


     


 


 


  (Habitrol 21 Mg


 Patch.24 Hr)  1 patch  DAILY


 T-DERMAL  5/21/17 16:30


     


 


 


 Miscellaneous


 Information  1  DAILY


 T-DERMAL  5/22/17 09:00


     


 


 


  (KlonoPIN)  2 mg  Q12HR


 PO  5/22/17 21:00


    5/24/17 21:13


 


 


  (Percocet  5-325


 Mg)  1 tab  Q8HR  PRN


 PO  5/23/17 19:45


    5/24/17 18:03


 


 


  (Oramorph Sr)  30 mg  Q12HR


 PO  5/23/17 21:00


    5/24/17 21:13


 


 


  (Desyrel)  300 mg  HS  PRN


 PO  5/24/17 11:00


     


 


 


  (Cymbalta Dr)  60 mg  BID


 PO  5/24/17 21:00


    5/24/17 21:13


 


 


  (Romazicon Inj)  0.2 mg  Q1M  PRN


 IV PUSH  5/24/17 13:45


     


 


 


  (Ativan)  1 mg  Q4H  PRN


 PO  5/24/17 13:45


     


 


 


  (Ativan Inj)  1 mg  Q4H  PRN


 IV PUSH  5/24/17 13:45


     


 


 


  (Ativan)  2 mg  Q2H  PRN


 PO  5/24/17 13:45


     


 


 


  (Ativan Inj)  2 mg  Q2H  PRN


 IV PUSH  5/24/17 13:45


     


 


 


  (Ativan Inj)  2 mg  Q1H  PRN


 IV PUSH  5/24/17 13:45


     


 


 


  (Ativan Inj)  2 mg  Q15M  PRN


 IV PUSH  5/24/17 13:45


     


 











A/P


Assessment and Plan


57 yo male with past medical history of previous hypertension, aortic valve 

stenosis, depression, anxiety and chronic neck and low back pain s/p cervical 

fusion x 3 and lumbar fusion who was admitted to the psychiatric unit due to 

suicidal ideation with plan and attempt.  Hospitalist services were consulted 

for medical management.





Depression/Anxiety/Suicide Attempt


   - Management of her psychiatric team





Hypertension/CAD


   - stress test done 4/20/17 without e/o ischemia


   - Resume home antihypertensive medications


   - Monitor BP and adjust treatment accordingly





Aortic valve stenosis


   - Per review of medical record, patient had previous echocardiogram done 04/ 20/17 showing EF 55-65% with no motion abnormalities and mild to moderate 

aortic valve stenosis





Chronic neck and low back pain s/p previous cervical and lumbar fusions with 

weak LUE and LLE as well as burning numb sensation in hands


   - patient reports dropping objects from left hand


   - cervical surgeries done decades ago without any recent follow up


   - MRI Cervical and Lumbar spine as above


   - confirmed medication regimen thru [facility] and will resume pain med regimen


   - Given lumbar MRI findings and lower and upper extremity weakness will 

consult neurosurgery.





Episode of chest pain last night and early this am per nursing staff


   - now resolved


   - Dr. Esqueda personally reviewed EKG revealing NSR and nonspecific T wave 

findings


   - troponins negative x 2


   - will continue to monitor for recurrence





Ongoing tobacco use


   - Nicotine patch


   - discussed smoking cessation/counseling offered





DVT prophylaxis


   - Encourage ambulation








Edgar Branch MD May 24, 2017 22:32

## 2017-05-24 NOTE — HHI.PYPN
Subjective


Remarks


Patient seen and examined with counselor and nurse.  Chart reviewed.  Reviewing 

MAR, I note patient has been using Ativan PRN maximally, ~q6h.  Case discussed 

with RN who reports patient has been medication seeking for benzodiazepines and 

opiates.  He was moved from 2600 to high acuity 2700 unit on 5/22 after 

articulating thoughts of wanting to hang himself.  He denies any suicidal 

ideation to me currently, although he does report ongoing intermittent SI at 

times.  He is indeed medication seeking, particularly for stimulants today 

saying that he felt better when he was on these several years ago.  He 

complains of ongoing anxiety, chiefly generalized, along with low mood.  He 

would like to be placed back on his Cymbalta 60mg BID as he now says, in 

contradistinction to his report to Dr. Catherine, that this was at least somewhat 

efficacious for mood/anxiety.  He does not think the trazodone is terribly 

helpful for sleep and would like to make this PRN.  We discuss alternative 

sleep aids, but patient would like to continue with the trazodone on a PRN 

basis.  Complains of chronic back pain.  No other physical complaints.





[facility] report reviewed.  I note that patient's opioids were last prescribed 4/

4 for a 30-day supply, although he did receive a Xanax script more recently.  

No recent stimulant Rx that I can see.  Some multisourcing noted.





Review of Systems


Except as stated in HPI:  all other systems reviewed are Neg





Objective


Alert:  Yes


[place]:  Person (O x 3)


Mood:  Anxious, Depressed


Affect:  Appropriate


Memory Intact:  Comment (Intact on clinical exam)


Hallucinations:  Other (No AVH)


Delusions:  No


Delusion Type:  Other (No delusions)


Suicidal:  Ideation (Denies SI at this time)


Homicidal:  Ideation (No HI)


Insight/Judgment


Fair


Remarks


No motor abnormalities noted.  No hand tremor, no diaphoresis, no mydriasis or 

other signs of benzo withdrawal.  No lacrimation, rhinorrhea, piloerection or 

signs of opiate withdrawal.  TP linear.  Grooming and hygiene good.


Labs











Test 5/23/17 5/23/17





 14:23 21:03


 


Troponin I LESS THAN 0.02 LESS THAN 0.02





 NG/ML NG/ML





Labs reviewed.


EKG sinus rhythm with QTc 397ms.


Vitals/IOs





 Vital Signs








  Date Time  Temp Pulse Resp B/P Pulse Ox O2 Delivery O2 Flow Rate FiO2


 


5/24/17 06:07 97.4 55 16 134/82 100   


 


5/21/17 14:10      Room Air  











Assessment & Plan


Problem List:  


(1) Adjustment disorder with mixed disturbance of emotions and conduct


ICD Code:  F43.25


Assessment & Plan


Add back Cymbalta 60mg BID.  Change trazodone to 300mg qHS PRN.  R/B/A for med 

changes discussed with pt.  Discontinue Ativan PRN and replace with CIWA with 

Ativan for objective symptoms of withdrawal.  I will continue Klonopin as 

ordered by Dr. Catherine.  There are some red-flags for substance misuse, chiefly 

the medication seeking behavior and multisourcing revealed by [facility], and so 

I think it would be prudent to move toward a controlled-substance sparing 

psychotropic regimen as we are able.  Hospitalist consultant input noted and 

appreciated.  Continue to monitor on the unit.  Continue other medications and 

care as ordered.


Justification for Cont. Inpt.


Med changes in process.  Monitoring for impairments in safety.


Discharge Planning


Anticipate patient will require an additional 3-5 inpatient days for 

observation and stabilization.


Request HC Surrog/Guard Advoc?:  No








Bj Grimes MD May 24, 2017 10:56

## 2017-05-24 NOTE — RADRPT
EXAM DATE/TIME:  05/24/2017 13:01 

 

[facility] COMPARISON:     

No previous studies available for comparison.

       

 

 

INDICATIONS :     

Neck and left arm pain with tingling in hand and armpit.

                     

 

CONTRAST:     

17 cc Omniscan (gadodiamide) IV

                     

 

MEDICAL HISTORY :     

None.     

 

SURGICAL HISTORY :     

Fusion, lumbar. Fusion, cervical.   rt. knee surgery

 

ENCOUNTER:     

Subsequent

 

ACUITY:     

3 day

 

PAIN SCORE:     

3/10

 

LOCATION:     

Left   arm

 

TECHNIQUE:     

Multiplanar, multisequence MRI examination of the cervical spine was performed.

 

FINDINGS:     

 

VERTEBRAE:     

There has been previous cervical fusion with solid fusion visible between C4 and C6. The alignment is
 satisfactory. Marrow signal is benign throughout.

 

ALIGNMENT:     

No evidence of subluxation.

 

CORD:     

Normal configuration and signal.

 

POST FOSSA:     

The cerebellar tonsils are normal in position.

 

POST-CONTRAST:     

No abnormal areas of enhancement are seen.

 

C2-C3: 

Small broad left paracentral disc protrusion moderately indenting ventral thecal sac. There is ample 
preservation of dorsal CSF signal. No evidence of foraminal stenosis.

 

C3-C4: 

A maggie undulating dorsal disc protrusion, broadly eccentric to the right with mild asymmetrically righ
t-sided foraminal stenosis. Slight indentation of the ventral thecal sac without overall canal stenos
is.

 

C4-C5: 

Fused level. Satisfactory canal and foramina.

 

C5-C6: 

Fused level. Satisfactory canal and foramina.

 

C6-C7:  

The thecal sac has a normal configuration.  There is no evidence of disc herniation or spinal canal s
tenosis.  The neural foramina are patent bilaterally.

 

C7-T1:  

The thecal sac has a normal configuration.  There is no evidence of disc herniation or spinal canal s
tenosis.  The neural foramina are patent bilaterally.

 

CONCLUSION:     

Previous multilevel cervical fusion. Mild disc abnormalities above the previous fusion with asymmetri
shailesh right-sided foraminal compromise at C3-4. No specific explanation for left upper extremity radi
cular symptoms.

 

 

 

 Giles Eli MD on May 24, 2017 at 14:44           

Board Certified Radiologist.

 This report was verified electronically.

## 2017-05-25 VITALS
HEART RATE: 60 BPM | SYSTOLIC BLOOD PRESSURE: 108 MMHG | RESPIRATION RATE: 18 BRPM | TEMPERATURE: 97.7 F | OXYGEN SATURATION: 98 % | DIASTOLIC BLOOD PRESSURE: 87 MMHG

## 2017-05-25 VITALS
HEART RATE: 68 BPM | OXYGEN SATURATION: 97 % | RESPIRATION RATE: 18 BRPM | TEMPERATURE: 97.9 F | DIASTOLIC BLOOD PRESSURE: 61 MMHG | SYSTOLIC BLOOD PRESSURE: 134 MMHG

## 2017-05-25 RX ADMIN — OXYCODONE HYDROCHLORIDE AND ACETAMINOPHEN PRN TAB: 10; 325 TABLET ORAL at 16:10

## 2017-05-25 RX ADMIN — OXYCODONE HYDROCHLORIDE AND ACETAMINOPHEN PRN TAB: 10; 325 TABLET ORAL at 04:00

## 2017-05-25 RX ADMIN — LISINOPRIL SCH MG: 20 TABLET ORAL at 08:42

## 2017-05-25 RX ADMIN — NICOTINE SCH PATCH: 21 PATCH, EXTENDED RELEASE TOPICAL at 09:00

## 2017-05-25 RX ADMIN — HYDROCHLOROTHIAZIDE SCH MG: 12.5 CAPSULE ORAL at 08:43

## 2017-05-25 RX ADMIN — MORPHINE SULFATE SCH MG: 30 TABLET, EXTENDED RELEASE ORAL at 13:31

## 2017-05-25 RX ADMIN — MORPHINE SULFATE SCH MG: 30 TABLET, EXTENDED RELEASE ORAL at 17:53

## 2017-05-25 RX ADMIN — DULOXETINE SCH MG: 60 CAPSULE, DELAYED RELEASE ORAL at 08:41

## 2017-05-25 RX ADMIN — OXYCODONE HYDROCHLORIDE AND ACETAMINOPHEN PRN TAB: 10; 325 TABLET ORAL at 21:11

## 2017-05-25 RX ADMIN — MORPHINE SULFATE SCH MG: 30 TABLET, EXTENDED RELEASE ORAL at 08:42

## 2017-05-25 RX ADMIN — OXYCODONE HYDROCHLORIDE AND ACETAMINOPHEN PRN TAB: 10; 325 TABLET ORAL at 10:00

## 2017-05-25 RX ADMIN — DULOXETINE SCH MG: 60 CAPSULE, DELAYED RELEASE ORAL at 21:10

## 2017-05-25 NOTE — HHI.PYPN
Subjective


Remarks


Patient seen and examined with counselor and nurse.  Chart reviewed.  I note 

the patient required only 1 dose of  PRN Ativan by WA overnight.  Case 

discussed with nursing staff who reports that the patient spends much of his 

time in the day area in no evident physical distress but will affect a limping 

gait when he believes that he is being observed by staff.  He is noted to be 

extremely focused on his pain medications.  On my examination today, the 

patient reports that his mood remains "a little" anxious and depressed.  He 

continues to deny SI, nor has there been any evidence of suicidality on the 

unit.  He reports that he slept better last night on his Cymbalta.  I try to 

engage the patient in a discussion of possible pharmacotherapeutic options to 

manage his residual psychiatric symptoms, which he reports include lack of 

energy/drive.  However, patient dismisses all options other than a stimulant.  

He makes clear that he is not interested in a med change if I am not going to 

put him on a stimulant.  He denies side effects from current medications.  No 

new physical complaints.





Review of Systems


Except as stated in HPI:  all other systems reviewed are Neg





Objective


Alert:  Yes


[place]:  Person (O x3)


Mood:  Anxious (mild), Depressed (mild)


Affect:  Appropriate


Memory Intact:  Comment (remains intact)


Hallucinations:  Other (no audiovisual hallucinations)


Delusions:  No


Delusion Type:  Other (no delusional material)


Suicidal:  Ideation (denies suicidal ideation)


Homicidal:  Ideation (no homicidal ideation)


Insight/Judgment


Fair


Remarks


Thought process linear.  Speech within normal limits for rate, tone and volume.

  Grooming and hygiene fair.  No motor abnormalities noted.


Labs


Labs reviewed.  No new labs.





Imaging findings noted


Last Impressions








Lumbar Spine MRI 5/24/17 0000 Signed





Impressions: 





 Service Date/Time:  Wednesday, May 24, 2017 13:01 - CONCLUSION:  Multilevel 

disc 





 abnormalities. The presumed disc fragment in a left paracentral location at L4-

5 





 may account for left leg radicular symptoms. Correlation recommended. 





 Acentrically right-sided findings at the L1-2 and L2-3 levels as described     





 Giles Eli MD 


 


Cervical Spine MRI 5/24/17 0000 Signed





Impressions: 





 Service Date/Time:  Wednesday, May 24, 2017 13:01 - CONCLUSION:  Previous 





 multilevel cervical fusion. Mild disc abnormalities above the previous fusion 





 with asymmetrically right-sided foraminal compromise at C3-4. No specific 





 explanation for left upper extremity radicular symptoms.     Giles Eli MD 


 


Chest X-Ray 5/20/17 1453 Signed





Impressions: 





 Service Date/Time:  Saturday, May 20, 2017 14:54 - CONCLUSION: No acute 





 cardiopulmonary disease identified.     Samuel De La Vega MD 








Vitals/IOs





 Vital Signs








  Date Time  Temp Pulse Resp B/P Pulse Ox O2 Delivery O2 Flow Rate FiO2


 


5/25/17 05:24 97.9 68 18 134/61 97   


 


5/21/17 14:10      Room Air  











Assessment & Plan


Problem List:  


(1) Adjustment disorder with mixed disturbance of emotions and conduct


ICD Code:  F43.25


Assessment & Plan


I do not believe that a stimulant is indicated in this patient at this time.  

The patient declines medication changes other than addition of a stimulant at 

this time.  Mood and anxiety level seemed to be improving and the patient 

denies suicidal ideation, and there has been no evidence of any suicidality on 

the unit.  Patient remains fairly medication seeking.  I will continue current 

psychotropics as ordered.  I have emphasized that I will provide the patient 

with only a limited supply of his benzodiazepine on discharge, and the patient 

assures me he has a provider who will write additional quantities for him on 

outpatient basis.  I will ask the hospitalist to evaluate the patient for 

medical clearance for discharge and to provide final discharge recommendations.

  Continue other medications and care as ordered.


Justification for Cont. Inpt.


Final discharge planning


Discharge Planning


Anticipate discharge tomorrow, Friday, barring some clinical deterioration.


Request HC Surrog/Guard Advoc?:  No








Bj Grimes MD May 25, 2017 13:18

## 2017-05-25 NOTE — HHI.PR
Subjective


Remarks


Deferred entry - patient seen at 4:30 pm


Patient states pain is better, weakness in left arm  and left lower 

extremity is still present and stable





Objective


Vitals





 Vital Signs








  Date Time  Temp Pulse Resp B/P Pulse Ox O2 Delivery O2 Flow Rate FiO2


 


5/25/17 15:15 97.7 60 18 108/87 98   


 


5/25/17 05:24 97.9 68 18 134/61 97   








Result Diagram:  


5/22/17 0704





Imaging





Last Impressions








Lumbar Spine MRI 5/24/17 0000 Signed





Impressions: 





 Service Date/Time:  Wednesday, May 24, 2017 13:01 - CONCLUSION:  Multilevel 

disc 





 abnormalities. The presumed disc fragment in a left paracentral location at L4-

5 





 may account for left leg radicular symptoms. Correlation recommended. 





 Acentrically right-sided findings at the L1-2 and L2-3 levels as described     





 Giles Eli MD 


 


Cervical Spine MRI 5/24/17 0000 Signed





Impressions: 





 Service Date/Time:  Wednesday, May 24, 2017 13:01 - CONCLUSION:  Previous 





 multilevel cervical fusion. Mild disc abnormalities above the previous fusion 





 with asymmetrically right-sided foraminal compromise at C3-4. No specific 





 explanation for left upper extremity radicular symptoms.     Giles Eli MD 


 


Chest X-Ray 5/20/17 1453 Signed





Impressions: 





 Service Date/Time:  Saturday, May 20, 2017 14:54 - CONCLUSION: No acute 





 cardiopulmonary disease identified.     Samuel De La Vega MD 








Objective Remarks


GENERAL: This is a well-nourished, well-developed patient, in no apparent 

distress.  Awake and alert.


SKIN: No rashes, ecchymoses or lesions. Cool and dry.


HEAD: Atraumatic. Normocephalic. No temporal or scalp tenderness.


EYES: Pupils equal round and reactive. Extraocular motions intact. No scleral 

icterus. No injection or drainage. 


ENT: Nose without bleeding, purulent drainage or septal hematoma. Throat 

without erythema, tonsillar hypertrophy or exudate. Uvula midline. Airway 

patent.


NECK: Trachea midline. No lymphadenopathy. Supple, nontender, no meningeal 

signs.


CARDIOVASCULAR: Regular rate and rhythm.  (+)3/6 holosystolic murmur radiating 

into the carotids.


RESPIRATORY: Clear to auscultation. Breath sounds equal bilaterally. No wheezes

, rales, or rhonchi.  


GASTROINTESTINAL: Abdomen soft, non-tender, nondistended. No hepato-splenomegaly

, or palpable masses. No guarding.


MUSCULOSKELETAL: Extremities without clubbing, cyanosis, or edema. No joint 

tenderness, effusion, or edema noted. No calf tenderness. 


NEUROLOGICAL: Awake and alert. Able to move all extremities.  Weakness LUE and 

LLE.  (+)weak left  strength.  Normal speech.


Medications and IVs





 Current Medications








 Medications


  (Trade)  Dose


 Ordered  Sig/Lashawn


 Route  Start Time


 Stop Time Status Last Admin


 


  (Milk Of


 Magnesia Liq)  30 ml  DAILY  PRN


 PO  5/21/17 16:15


     


 


 


  (Mag-Al Plus


 Susp Liq)  30 ml  Q6H  PRN


 PO  5/21/17 16:15


     


 


 


  (Habitrol 21 Mg


 Patch.24 Hr)  1 patch  DAILY


 T-DERMAL  5/21/17 16:30


     


 


 


 Miscellaneous


 Information  1  DAILY


 T-DERMAL  5/22/17 09:00


     


 


 


  (KlonoPIN)  2 mg  Q12HR


 PO  5/22/17 21:00


    5/25/17 21:10


 


 


  (Desyrel)  300 mg  HS  PRN


 PO  5/24/17 11:00


     


 


 


  (Cymbalta Dr)  60 mg  BID


 PO  5/24/17 21:00


    5/25/17 21:10


 


 


  (Romazicon Inj)  0.2 mg  Q1M  PRN


 IV PUSH  5/24/17 13:45


     


 


 


  (Ativan)  1 mg  Q4H  PRN


 PO  5/24/17 13:45


    5/24/17 22:29


 


 


  (Ativan Inj)  1 mg  Q4H  PRN


 IV PUSH  5/24/17 13:45


     


 


 


  (Ativan)  2 mg  Q2H  PRN


 PO  5/24/17 13:45


     


 


 


  (Ativan Inj)  2 mg  Q2H  PRN


 IV PUSH  5/24/17 13:45


     


 


 


  (Ativan Inj)  2 mg  Q1H  PRN


 IV PUSH  5/24/17 13:45


     


 


 


  (Ativan Inj)  2 mg  Q15M  PRN


 IV PUSH  5/24/17 13:45


     


 


 


  (Prinivil)  20 mg  DAILY


 PO  5/25/17 09:00


    5/25/17 08:42


 


 


  (Microzide)  12.5 mg  DAILY


 PO  5/25/17 09:00


    5/25/17 08:43


 


 


  (Oramorph Sr)  30 mg  TID


 PO  5/25/17 09:00


    5/25/17 17:53


 


 


  (Percocet 


 Mg)  1 tab  Q6H  PRN


 PO  5/24/17 22:45


    5/25/17 21:11


 











A/P


Assessment and Plan


55 yo male with past medical history of previous hypertension, aortic valve 

stenosis, depression, anxiety and chronic neck and low back pain s/p cervical 

fusion x 3 and lumbar fusion who was admitted to the psychiatric unit due to 

suicidal ideation with plan and attempt.  Hospitalist services were consulted 

for medical management.





Depression/Anxiety/Suicide Attempt


   - Management of her psychiatric team





Hypertension/CAD


   - stress test done 4/20/17 without e/o ischemia


   - Resume home antihypertensive medications


   - Monitor BP and adjust treatment accordingly





Aortic valve stenosis


   - Per review of medical record, patient had previous echocardiogram done 04/ 20/17 showing EF 55-65% with no motion abnormalities and mild to moderate 

aortic valve stenosis





Chronic neck and low back pain s/p previous cervical and lumbar fusions with 

weak LUE and LLE as well as burning numb sensation in hands


   - patient reports dropping objects from left hand


   - cervical surgeries done decades ago without any recent follow up


   - MRI Cervical and Lumbar spine as above


   - confirmed medication regimen thru [facility]  - Continue Oramorph 30 mg PO 

TID and Percocet 10/325 mg Q 6 hrs as needed.


   - Given lumbar MRI findings and lower and upper extremity weakness 

neurosurgery has been consulted - recommendations pending.





Episode of chest pain last night and early this am per nursing staff


   - now resolved


   - Dr. Esqueda personally reviewed EKG revealing NSR and nonspecific T wave 

findings


   - troponins negative x 2


   - will continue to monitor for recurrence





Ongoing tobacco use


   - Nicotine patch


   - discussed smoking cessation/counseling offered





DVT prophylaxis


   - Encourage ambulation








Edgar Branch MD May 25, 2017 23:22

## 2017-05-26 VITALS
SYSTOLIC BLOOD PRESSURE: 90 MMHG | HEART RATE: 58 BPM | TEMPERATURE: 97.8 F | RESPIRATION RATE: 18 BRPM | OXYGEN SATURATION: 96 % | DIASTOLIC BLOOD PRESSURE: 54 MMHG

## 2017-05-26 VITALS
RESPIRATION RATE: 18 BRPM | OXYGEN SATURATION: 100 % | DIASTOLIC BLOOD PRESSURE: 60 MMHG | TEMPERATURE: 98.1 F | SYSTOLIC BLOOD PRESSURE: 129 MMHG | HEART RATE: 57 BPM

## 2017-05-26 RX ADMIN — MORPHINE SULFATE SCH MG: 30 TABLET, EXTENDED RELEASE ORAL at 09:21

## 2017-05-26 RX ADMIN — NICOTINE SCH PATCH: 21 PATCH, EXTENDED RELEASE TOPICAL at 09:00

## 2017-05-26 RX ADMIN — OXYCODONE HYDROCHLORIDE AND ACETAMINOPHEN PRN TAB: 10; 325 TABLET ORAL at 03:57

## 2017-05-26 RX ADMIN — DULOXETINE SCH MG: 60 CAPSULE, DELAYED RELEASE ORAL at 09:21

## 2017-05-26 RX ADMIN — LISINOPRIL SCH MG: 20 TABLET ORAL at 09:20

## 2017-05-26 RX ADMIN — MORPHINE SULFATE SCH MG: 30 TABLET, EXTENDED RELEASE ORAL at 18:35

## 2017-05-26 RX ADMIN — OXYCODONE HYDROCHLORIDE AND ACETAMINOPHEN PRN TAB: 10; 325 TABLET ORAL at 16:29

## 2017-05-26 RX ADMIN — OXYCODONE HYDROCHLORIDE AND ACETAMINOPHEN PRN TAB: 10; 325 TABLET ORAL at 21:58

## 2017-05-26 RX ADMIN — OXYCODONE HYDROCHLORIDE AND ACETAMINOPHEN PRN TAB: 10; 325 TABLET ORAL at 10:38

## 2017-05-26 RX ADMIN — HYDROCHLOROTHIAZIDE SCH MG: 12.5 CAPSULE ORAL at 09:20

## 2017-05-26 RX ADMIN — DULOXETINE SCH MG: 60 CAPSULE, DELAYED RELEASE ORAL at 20:40

## 2017-05-26 RX ADMIN — MORPHINE SULFATE SCH MG: 30 TABLET, EXTENDED RELEASE ORAL at 13:19

## 2017-05-26 RX ADMIN — BUPROPION HYDROCHLORIDE SCH MG: 100 TABLET, FILM COATED ORAL at 17:29

## 2017-05-26 NOTE — HHI.PYPN
Subjective


Remarks


Patient seen and examined with counselor and nurse.  Chart reviewed.  I note 

that the hospitalist has requested a neurosurgical consultation in light of 

patient's imaging findings, and this is presently pending.  Case discussed with 

nursing staff who reports that the patient began to report increased depressive 

symptoms after our discussion about possible discharge today.  At the same time

, patient is noted by nursing staff not to appear particularly depressed either 

when surreptitiously observed or in his interactions with peers.  On my 

examination today, patient does indeed report recrudescence of depressive 

symptoms, including reported intrusive suicidal thoughts, in this time-frame.  

No active SI or urge to hurt himself on the unit at this time.  He says that he 

has reconsidered his previous stance regarding medication changes and would now 

like to try to augment his Cymbalta with Wellbutrin, as he has not tried this 

combination before.  We discuss the risks and benefits of this medication change

, and patient is agreeable to a trial of Wellbutrin.  Denies side effects from 

medications.  No new physical complaints.





Review of Systems


Except as stated in HPI:  all other systems reviewed are Neg





Objective


Alert:  Yes


[place]:  Person (O x 3)


Mood:  Depressed (reportedly worsened)


Affect:  Blunted


Memory Intact:  Comment (Intact)


Hallucinations:  Other (No AVH)


Delusions:  No


Delusion Type:  Other (No delusional material)


Suicidal:  Ideation (Fleeting, intrusive SI.  No active SI at this time.)


Homicidal:  Ideation (No HI)


Insight/Judgment


Fair


Remarks


No motoric abnormalities noted.  Thought process linear.  Speech within normal 

limits for rate, tone and volume.  Grooming and hygiene fair.


Labs


Labs reviewed.  No new labs.


Vitals/IOs





 Vital Signs








  Date Time  Temp Pulse Resp B/P Pulse Ox O2 Delivery O2 Flow Rate FiO2


 


5/26/17 06:02 98.1 57 18 129/60 100   











Assessment & Plan


Problem List:  


(1) Adjustment disorder with mixed disturbance of emotions and conduct


ICD Code:  F43.25


Assessment & Plan


Patient with reported worsening of depressive symptoms in the context of 

planned discharge for today.  I cannot help but have some degree of suspicion 

that he is exaggerating his symptoms in service of remaining on the inpatient 

unit, possibly to continue to have access to the controlled substances he is 

prescribed here.  I will add Wellbutrin SR 100mg BID to augment his Cymbalta.  

Awaiting neurosurgical consultation.  Continue other medications and care as 

ordered.


Justification for Cont. Inpt.


Medication changes in process.


Discharge Planning


Monitor over weekend.  Anticipate discharge Monday.


Request HC Surrog/Guard Advoc?:  No








Bj Grimes MD May 26, 2017 12:08

## 2017-05-27 VITALS
OXYGEN SATURATION: 98 % | RESPIRATION RATE: 19 BRPM | HEART RATE: 54 BPM | SYSTOLIC BLOOD PRESSURE: 110 MMHG | TEMPERATURE: 98 F | DIASTOLIC BLOOD PRESSURE: 65 MMHG

## 2017-05-27 VITALS
RESPIRATION RATE: 18 BRPM | DIASTOLIC BLOOD PRESSURE: 98 MMHG | SYSTOLIC BLOOD PRESSURE: 124 MMHG | TEMPERATURE: 98.2 F | HEART RATE: 59 BPM | OXYGEN SATURATION: 93 %

## 2017-05-27 RX ADMIN — MORPHINE SULFATE SCH MG: 30 TABLET, EXTENDED RELEASE ORAL at 08:57

## 2017-05-27 RX ADMIN — HYDROCHLOROTHIAZIDE SCH MG: 12.5 CAPSULE ORAL at 08:56

## 2017-05-27 RX ADMIN — OXYCODONE HYDROCHLORIDE AND ACETAMINOPHEN PRN TAB: 10; 325 TABLET ORAL at 13:55

## 2017-05-27 RX ADMIN — NICOTINE SCH PATCH: 21 PATCH, EXTENDED RELEASE TOPICAL at 09:00

## 2017-05-27 RX ADMIN — OXYCODONE HYDROCHLORIDE AND ACETAMINOPHEN PRN TAB: 10; 325 TABLET ORAL at 21:32

## 2017-05-27 RX ADMIN — LISINOPRIL SCH MG: 20 TABLET ORAL at 08:56

## 2017-05-27 RX ADMIN — BUPROPION HYDROCHLORIDE SCH MG: 100 TABLET, FILM COATED ORAL at 15:00

## 2017-05-27 RX ADMIN — BUPROPION HYDROCHLORIDE SCH MG: 100 TABLET, FILM COATED ORAL at 09:00

## 2017-05-27 RX ADMIN — OXYCODONE HYDROCHLORIDE AND ACETAMINOPHEN PRN TAB: 10; 325 TABLET ORAL at 03:56

## 2017-05-27 RX ADMIN — DULOXETINE SCH MG: 60 CAPSULE, DELAYED RELEASE ORAL at 21:32

## 2017-05-27 RX ADMIN — MORPHINE SULFATE SCH MG: 30 TABLET, EXTENDED RELEASE ORAL at 13:00

## 2017-05-27 RX ADMIN — POLYVINYL ALCOHOL PRN DROP: 14 SOLUTION/ DROPS OPHTHALMIC at 21:34

## 2017-05-27 RX ADMIN — MORPHINE SULFATE SCH MG: 30 TABLET, EXTENDED RELEASE ORAL at 17:26

## 2017-05-27 RX ADMIN — OXYCODONE HYDROCHLORIDE AND ACETAMINOPHEN PRN TAB: 10; 325 TABLET ORAL at 10:01

## 2017-05-27 RX ADMIN — DULOXETINE SCH MG: 60 CAPSULE, DELAYED RELEASE ORAL at 08:56

## 2017-05-27 NOTE — HHI.PYPN
Subjective


Remarks


Pt seen and discussed with staff. He c/o of depression and states that he had 

thoughts of suicide upon awakening this morning. He denies plan or intent. He 

has been participating in all activities. No HI. No psychosis. No medication 

side effects.





Objective


Alert:  Yes


[place]:  Person, Place, Date


Mood:  Depressed


Affect:  Restricted


Memory Intact:  Comment (Intact)


Hallucinations:  Other (No AVH)


Delusions:  No


Delusion Type:  Other (No delusional material)


Suicidal:  Intent (denies), Plan (denies), Ideation (Fleeting, intrusive SI.  

No active SI at this time.)


Homicidal:  Ideation (No HI)


Insight/Judgment


poor


Vitals/IOs





 Vital Signs








  Date Time  Temp Pulse Resp B/P Pulse Ox O2 Delivery O2 Flow Rate FiO2


 


5/27/17 06:12 98.0 54 19 110/65 98   











Assessment & Plan


Problem List:  


(1) Adjustment disorder with mixed disturbance of emotions and conduct


ICD Code:  F43.25


Assessment & Plan


Continue current tx plan. Estimated LOS:  days


Justification for Cont. Inpt.


monitoring for safety


Request HC Surrog/Guard Advoc?:  Elif Aragon MD May 27, 2017 14:40

## 2017-05-27 NOTE — HHI.PR
Subjective


Remarks


patient c/o worsening bl knee pain over the past year


denies trauma to knees


pain in back and neck controlled


weakness in left upper and lower extremity same


stable vital signs





Objective


Vitals





 Vital Signs








  Date Time  Temp Pulse Resp B/P Pulse Ox O2 Delivery O2 Flow Rate FiO2


 


5/27/17 17:17 98.2 59 18 124/98 93   


 


5/27/17 06:12 98.0 54 19 110/65 98   








Imaging





Last Impressions








Lumbar Spine MRI 5/24/17 0000 Signed





Impressions: 





 Service Date/Time:  Wednesday, May 24, 2017 13:01 - CONCLUSION:  Multilevel 

disc 





 abnormalities. The presumed disc fragment in a left paracentral location at L4-

5 





 may account for left leg radicular symptoms. Correlation recommended. 





 Acentrically right-sided findings at the L1-2 and L2-3 levels as described     





 Giles Eli MD 


 


Cervical Spine MRI 5/24/17 0000 Signed





Impressions: 





 Service Date/Time:  Wednesday, May 24, 2017 13:01 - CONCLUSION:  Previous 





 multilevel cervical fusion. Mild disc abnormalities above the previous fusion 





 with asymmetrically right-sided foraminal compromise at C3-4. No specific 





 explanation for left upper extremity radicular symptoms.     Giles Eli MD 


 


Chest X-Ray 5/20/17 1453 Signed





Impressions: 





 Service Date/Time:  Saturday, May 20, 2017 14:54 - CONCLUSION: No acute 





 cardiopulmonary disease identified.     Samuel De La Vega MD 








Objective Remarks


GENERAL: This is a well-nourished, well-developed patient, in no apparent 

distress.  Awake and alert.


SKIN: No rashes, ecchymoses or lesions. Cool and dry.


HEAD: Atraumatic. Normocephalic. No temporal or scalp tenderness.


EYES: Pupils equal round and reactive. Extraocular motions intact. No scleral 

icterus. No injection or drainage. 


ENT: Nose without bleeding, purulent drainage or septal hematoma. Throat 

without erythema, tonsillar hypertrophy or exudate. Uvula midline. Airway 

patent.


NECK: Trachea midline. No lymphadenopathy. Supple, nontender, no meningeal 

signs.


CARDIOVASCULAR: Regular rate and rhythm.  (+)3/6 holosystolic murmur radiating 

into the carotids.


RESPIRATORY: Clear to auscultation. Breath sounds equal bilaterally. No wheezes

, rales, or rhonchi.  


GASTROINTESTINAL: Abdomen soft, non-tender, nondistended. No hepato-splenomegaly

, or palpable masses. No guarding.


MUSCULOSKELETAL: Extremities without clubbing, cyanosis, or edema. No joint 

tenderness, effusion, or edema noted. No calf tenderness. 


NEUROLOGICAL: Awake and alert. Able to move all extremities.  Weakness LUE and 

LLE.  (+)weak left  strength.  Normal speech.


Medications and IVs





 Current Medications








 Medications


  (Trade)  Dose


 Ordered  Sig/Lashawn


 Route  Start Time


 Stop Time Status Last Admin


 


  (Milk Of


 Magnesia Liq)  30 ml  DAILY  PRN


 PO  5/21/17 16:15


     


 


 


  (Mag-Al Plus


 Susp Liq)  30 ml  Q6H  PRN


 PO  5/21/17 16:15


     


 


 


  (Habitrol 21 Mg


 Patch.24 Hr)  1 patch  DAILY


 T-DERMAL  5/21/17 16:30


     


 


 


 Miscellaneous


 Information  1  DAILY


 T-DERMAL  5/22/17 09:00


     


 


 


  (KlonoPIN)  2 mg  Q12HR


 PO  5/22/17 21:00


    5/27/17 08:56


 


 


  (Cymbalta Dr)  60 mg  BID


 PO  5/24/17 21:00


    5/27/17 08:56


 


 


  (Romazicon Inj)  0.2 mg  Q1M  PRN


 IV PUSH  5/24/17 13:45


     


 


 


  (Ativan)  1 mg  Q4H  PRN


 PO  5/24/17 13:45


    5/24/17 22:29


 


 


  (Ativan Inj)  1 mg  Q4H  PRN


 IV PUSH  5/24/17 13:45


     


 


 


  (Ativan)  2 mg  Q2H  PRN


 PO  5/24/17 13:45


     


 


 


  (Ativan Inj)  2 mg  Q2H  PRN


 IV PUSH  5/24/17 13:45


     


 


 


  (Ativan Inj)  2 mg  Q1H  PRN


 IV PUSH  5/24/17 13:45


     


 


 


  (Ativan Inj)  2 mg  Q15M  PRN


 IV PUSH  5/24/17 13:45


     


 


 


  (Prinivil)  20 mg  DAILY


 PO  5/25/17 09:00


    5/27/17 08:56


 


 


  (Microzide)  12.5 mg  DAILY


 PO  5/25/17 09:00


    5/27/17 08:56


 


 


  (Oramorph Sr)  30 mg  TID


 PO  5/25/17 09:00


    5/27/17 17:26


 


 


  (Percocet 


 Mg)  1 tab  Q6H  PRN


 PO  5/24/17 22:45


    5/27/17 10:01


 


 


  (Wellbutrin Sr


 12 Hr)  100 mg  BID@09,15


 PO  5/26/17 15:00


    5/27/17 15:00


 


 


  (Tears Naturale


 Opth Soln)  1 drop  Q4H  PRN


 EACH EYE  5/27/17 15:00


     


 


 


  (Atarax)  25 mg  DAILY  PRN


 PO  5/27/17 16:00


    5/27/17 16:10


 











A/P


Assessment and Plan


57 yo male with past medical history of previous hypertension, aortic valve 

stenosis, depression, anxiety and chronic neck and low back pain s/p cervical 

fusion x 3 and lumbar fusion who was admitted to the psychiatric unit due to 

suicidal ideation with plan and attempt.  Hospitalist services were consulted 

for medical management.





Depression/Anxiety/Suicide Attempt


   - Management of her psychiatric team





Hypertension/CAD


   - stress test done 4/20/17 without e/o ischemia


   - Continnue home antihypertensive medications


   - Monitor BP and adjust treatment accordingly





Aortic valve stenosis


   - Per review of medical record, patient had previous echocardiogram done 04/ 20/17 showing EF 55-65% with no motion abnormalities and mild to moderate 

aortic valve stenosis





Chronic neck and low back pain s/p previous cervical and lumbar fusions with 

weak LUE and LLE as well as burning numb sensation in hands


   - patient reports dropping objects from left hand


   - cervical surgeries done decades ago without any recent follow up


   - MRI Cervical and Lumbar spine as above


   - confirmed medication regimen thru [facility]  - Continue Oramorph 30 mg PO 

TID and Percocet 10/325 mg Q 6 hrs as needed.


   - Given lumbar MRI findings and lower and upper extremity weakness 

neurosurgery has been consulted - recommendations pending.


   - discussed case with RN - consult was placed 5/24 - RN will try to get in 

touch with neurosurgeon that was consulted  to provide recommendations.





Episode of chest pain last night and early this am per nursing staff


   - now resolved


   - Dr. Esqueda personally reviewed EKG revealing NSR and nonspecific T wave 

findings


   - troponins negative x 2


   - will continue to monitor for recurrence





Ongoing tobacco use


   - Nicotine patch


   - discussed smoking cessation/counseling offered





BL Knee pain


   -Patient has h/o arthroscopic knee surgery - will check knee x rays.





DVT prophylaxis


   - Encourage ambulation








Edgar Branch MD May 27, 2017 17:32

## 2017-05-27 NOTE — RADRPT
EXAM DATE/TIME:  05/27/2017 20:50 

 

[facility] COMPARISON:     

No previous studies available for comparison.

 

                     

INDICATIONS :     

CHRONIC LEFT KNEE PAIN

                     

 

MEDICAL HISTORY :     

Osteoarthritis.       Fusion, lumbar. Fusion, cervical.  Right knee surgery   

 

SURGICAL HISTORY :        

Lumbar fusion

 

ENCOUNTER:     

Initial                                        

 

ACUITY:     

1 day      

 

PAIN SCORE:     

7/10

 

LOCATION:     

Left knee

 

FINDINGS:     

There are moderate degenerative changes with loss of articular cartilage in the medial compartment.  
Osteophytes are projected in the patellofemoral compartment.  Alignment is anatomic.  A fracture is n
ot appreciated.

 

Impression moderate degenerative changes without  fracture.  

 

 

 Pranav Gunter MD FACR on May 27, 2017 at 21:18                

Board Certified Radiologist.

 This report was verified electronically.

## 2017-05-27 NOTE — RADRPT
EXAM DATE/TIME:  05/27/2017 20:50 

 

[facility] COMPARISON:     No previous studies available for comparison.

 

                     

INDICATIONS :     CHRONIC RIGHT KNEE PAIN

                     

MEDICAL HISTORY :     Osteoarthritis.       Fusion, lumbar. Fusion, cervical.  Right knee surgery   

SURGICAL HISTORY :        lower back

ENCOUNTER:     Initial                                        

ACUITY:     1 day      

PAIN SCORE:     7/10

LOCATION:     Right knee

 

FINDINGS:     

There are degenerative changes in the knee with marked loss of articular cartilage in the medial comp
artment.  Loose bodies are evident.  Osteophytes are present in the patellofemoral compartment.

 

CONCLUSION:     

Extensive degenerative changes with intraarticular loose bodies.

 

 Pranav Gunter MD FACR on May 27, 2017 at 21:18                

Board Certified Radiologist.

 This report was verified electronically.

## 2017-05-28 VITALS
SYSTOLIC BLOOD PRESSURE: 103 MMHG | DIASTOLIC BLOOD PRESSURE: 61 MMHG | RESPIRATION RATE: 18 BRPM | HEART RATE: 56 BPM | TEMPERATURE: 98 F | OXYGEN SATURATION: 97 %

## 2017-05-28 VITALS
HEART RATE: 72 BPM | DIASTOLIC BLOOD PRESSURE: 58 MMHG | OXYGEN SATURATION: 95 % | SYSTOLIC BLOOD PRESSURE: 117 MMHG | RESPIRATION RATE: 17 BRPM | TEMPERATURE: 98.9 F

## 2017-05-28 RX ADMIN — MORPHINE SULFATE SCH MG: 30 TABLET, EXTENDED RELEASE ORAL at 20:39

## 2017-05-28 RX ADMIN — HYDROCHLOROTHIAZIDE SCH MG: 12.5 CAPSULE ORAL at 08:15

## 2017-05-28 RX ADMIN — BUPROPION HYDROCHLORIDE SCH MG: 100 TABLET, FILM COATED ORAL at 14:23

## 2017-05-28 RX ADMIN — MORPHINE SULFATE SCH MG: 30 TABLET, EXTENDED RELEASE ORAL at 08:15

## 2017-05-28 RX ADMIN — POLYVINYL ALCOHOL PRN DROP: 14 SOLUTION/ DROPS OPHTHALMIC at 06:04

## 2017-05-28 RX ADMIN — NICOTINE SCH PATCH: 21 PATCH, EXTENDED RELEASE TOPICAL at 08:17

## 2017-05-28 RX ADMIN — MORPHINE SULFATE SCH MG: 30 TABLET, EXTENDED RELEASE ORAL at 13:26

## 2017-05-28 RX ADMIN — OXYCODONE HYDROCHLORIDE AND ACETAMINOPHEN PRN TAB: 10; 325 TABLET ORAL at 04:36

## 2017-05-28 RX ADMIN — DULOXETINE SCH MG: 60 CAPSULE, DELAYED RELEASE ORAL at 08:15

## 2017-05-28 RX ADMIN — DULOXETINE SCH MG: 60 CAPSULE, DELAYED RELEASE ORAL at 20:39

## 2017-05-28 RX ADMIN — BUPROPION HYDROCHLORIDE SCH MG: 100 TABLET, FILM COATED ORAL at 08:15

## 2017-05-28 RX ADMIN — OXYCODONE HYDROCHLORIDE AND ACETAMINOPHEN PRN TAB: 10; 325 TABLET ORAL at 10:51

## 2017-05-28 RX ADMIN — LISINOPRIL SCH MG: 20 TABLET ORAL at 08:15

## 2017-05-28 RX ADMIN — POLYVINYL ALCOHOL PRN DROP: 14 SOLUTION/ DROPS OPHTHALMIC at 20:38

## 2017-05-28 NOTE — HHI.PYPN
Subjective


Remarks


Pt seen and discussed with staff. Pt has been compliant with medications. Staff 

report that he has been medication focused and was sedated in the morning, but 

he is alert now. He denies SI/HI. Depression decreased.





Objective


Alert:  Yes


[place]:  Person, Place, Date


Mood:  Depressed


Affect:  Restricted


Memory Intact:  Comment (Intact)


Hallucinations:  Other (No AVH)


Delusions:  No


Delusion Type:  Other (No delusional material)


Suicidal:  Intent (denies), Plan (denies), Ideation (denies)


Homicidal:  Ideation (No HI)


Insight/Judgment


limited


Vitals/IOs





 Vital Signs








  Date Time  Temp Pulse Resp B/P Pulse Ox O2 Delivery O2 Flow Rate FiO2


 


5/28/17 06:45 98.9 72 17 117/58 95   











Assessment & Plan


Problem List:  


(1) Adjustment disorder with mixed disturbance of emotions and conduct


ICD Code:  F43.25


Assessment & Plan


Will lower dose of klonopin as coupled with pain medications is contributing to 

oversedation. He is on CIWA scale. Will also discontinue vistaril. Primary team 

will assess and continue with taper.   Estimated LOS:  days


Justification for Cont. Inpt.


medication changes


Request HC Surrog/Guard Advoc?:  No








Elif Dahl MD May 28, 2017 12:33

## 2017-05-28 NOTE — PD.CONS
History of Present Illness


Service


Neurosurgery


Consult Requested By


Dr. Edgar Gleason


Reason for Consult


Low back pain


Primary Care Physician


No Primary Care Physician


Diagnoses:  


History of Present Illness


56-year-old male recently presented to the emergency room with depression and 

suicidal ideation.  Admitted for inpatient psychiatric evaluation.


He has a history of chronic neck and low back pain, with several previous 

emergency room evaluations for chronic pain.  He indicates that he has had 

multiple spinal fusion procedures.





Past Family Social History


Allergies:  


Coded Allergies:  


     Phenobarbital (Verified  Allergy, Unknown, unknown, 5/20/17)


Past Medical History


Aortic stenosis


Anxiety disorder


Impression


Chronic neck and back pain


Arthritis


Past Surgical History


Cervical and lumbar fusions


Hand surgery


Reported Medications





Reported Meds & Active Scripts


Active


Reported


Percocet (Oxycodone-Acetaminophen)  mg Tab 1 Tab PO Q6H PRN


Ms Contin (Morphine Sulfate) 30 Mg Tab 30 Mg PO TID


Zestoretic (Lisinopril-Hctz) 20-12.5 Mg Tab 1 Tab PO DAILY


Zanaflex (Tizanidine HCl) 4 Mg Cap 4 Mg PO TID


Cymbalta DR (Duloxetine HCl) 60 Mg Capdr 60 Mg PO DAILY


Alprazolam 1 Mg Tab 1 Mg PO QID PRN


Social History


Drinks alcohol occasionally


Smokes a few cigarettes per day





Physical Exam


Vital Signs





 Vital Signs








  Date Time  Temp Pulse Resp B/P Pulse Ox O2 Delivery O2 Flow Rate FiO2


 


5/28/17 06:45 98.9 72 17 117/58 95   








Physical Exam


The patient is awake, alert, oriented, conversant and appropriate.  Recent and 

remote memory appear intact.  Speech is clear.


There is mild tenderness in the cervical and  upper thoracic midline and 

paraspinous musculature.  Cervical range of motion is normal.  There is no 

complaint of pain with cervical spine range of motion.  Range of motion of the 

right and left shoulder is normal without complaint of discomfort.  There is no 

atrophy or fasciculations noted in the upper extremities.  There is no upper 

extremity edema or cyanosis.





There is mild tenderness in the lumbar midline and paraspinous musculature. 

There is no significant gluteal or lateral hip tenderness. There is no lower 

extremity edema or cyanosis.





There is  complaint of low back pain with internal and external rotation of the 

right and left hip with normal hip range of motion. Straight leg raise is 

negative to 90 degrees sitting position on the right and left. There is normal 

muscle tone and bulk in the right and left lower extremity without atrophy or 

fasciculations.





Sensation is intact to light touch throughout the upper extremities.except 

complaint of diffuse paresthesias in both hands.





Strength is normal in all major flexion and extension groups and hand intrinsic 

musculature in the right and left upper extremity except for mild diffuse 

decrease in the left upper extremity which appears to be due to guarding 

secondary to pain.





Benny's response is absent on the right and left.





Sensation is decreased to light touch in the lower extremities.over the L5 

greater than L4 distribution of the left thigh.                                

                                 





Strength is normal in major flexion and extension groups inversion/eversion of 

the foot in the right and left lower extremity.





Straight leg raise is negative to 90 degrees on the right and left. The patient 

gets in and out of chair without significant difficulty. His gait is normal.


Imaging





5/24/17 cervical and lumbar spine MRI scan images reviewed by the undersigned.


Agree with findings as noted below:











Knee X-Ray 5/27/17 0000 Signed





Impressions: 





 Service Date/Time:  Saturday, May 27, 2017 20:50 - CONCLUSION:  Extensive 





 degenerative changes with intraarticular loose bodies.   Pranav Gunter MD  





 FACR


 


Lumbar Spine MRI 5/24/17 0000 Signed





Impressions: 





 Service Date/Time:  Wednesday, May 24, 2017 13:01 - CONCLUSION:  Multilevel 

disc 





 abnormalities. The presumed disc fragment in a left paracentral location at L4-

5 





 may account for left leg radicular symptoms. Correlation recommended. 





 Acentrically right-sided findings at the L1-2 and L2-3 levels as described     





 Giles Eli MD 


 


Cervical Spine MRI 5/24/17 0000 Signed





Impressions: 





 Service Date/Time:  Wednesday, May 24, 2017 13:01 - CONCLUSION:  Previous 





 multilevel cervical fusion. Mild disc abnormalities above the previous fusion 





 with asymmetrically right-sided foraminal compromise at C3-4. No specific 





 explanation for left upper extremity radicular symptoms.     Giles Eli MD 


 


Chest X-Ray 5/20/17 1453 Signed





Impressions: 





 Service Date/Time:  Saturday, May 20, 2017 14:54 - CONCLUSION: No acute 





 cardiopulmonary disease identified.     Samuel De La Vega MD 











Assessment and Plan


Assessment and Plan


Impression:


1.  Chronic appearing left L4 5 herniated nucleus pulposus with impingement on 

the left L4 and possibly L5 nerve root.


2. probable chronic left L5 radiculopathy with sensory deficit


3. chronic neck pain, prior multilevel cervical  fusion with no evidence of 

significant residual nerve or spinal cord compression


4. Chronic LBP





Recommendations:


 Prognosis for recovery of sensation and resolution of L5 radicular pain 

symptoms seems rather low given the chronic nature of the symptoms and lack of 

significant L5 nerve compression at the previous L4-5 laminectomy site


Have recommended he continue to follow with pain management at [facility] 

where has been seen in the past. May be a candidate for lumbar faet block and 

rhizotomy.


In regards to more recent upper extremity symptoms, he should consider 

outpatient neurology evaluation and possible EMG to assess for neuropathy given 

the lack of significant findings on cervical MRI.


I can see him back as an outpatient as needed.








Thom Jernigan MD May 28, 2017 18:08

## 2017-05-28 NOTE — HHI.PR
Subjective


Remarks


pain is controlled


Denies nausea and vomiting


as per RN patient asking for pain medications more frequent than what he should


denies cp/sob





Objective


Vitals





 Vital Signs








  Date Time  Temp Pulse Resp B/P Pulse Ox O2 Delivery O2 Flow Rate FiO2


 


5/28/17 06:45 98.9 72 17 117/58 95   


 


5/27/17 17:17 98.2 59 18 124/98 93   








Imaging





Last Impressions








Knee X-Ray 5/27/17 0000 Signed





Impressions: 





 Service Date/Time:  Saturday, May 27, 2017 20:50 - CONCLUSION:  Extensive 





 degenerative changes with intraarticular loose bodies.   Pranav Gunter MD  





 FACR


 


Lumbar Spine MRI 5/24/17 0000 Signed





Impressions: 





 Service Date/Time:  Wednesday, May 24, 2017 13:01 - CONCLUSION:  Multilevel 

disc 





 abnormalities. The presumed disc fragment in a left paracentral location at L4-

5 





 may account for left leg radicular symptoms. Correlation recommended. 





 Acentrically right-sided findings at the L1-2 and L2-3 levels as described     





 Giles Eli MD 


 


Cervical Spine MRI 5/24/17 0000 Signed





Impressions: 





 Service Date/Time:  Wednesday, May 24, 2017 13:01 - CONCLUSION:  Previous 





 multilevel cervical fusion. Mild disc abnormalities above the previous fusion 





 with asymmetrically right-sided foraminal compromise at C3-4. No specific 





 explanation for left upper extremity radicular symptoms.     Giles Eli MD 


 


Chest X-Ray 5/20/17 1453 Signed





Impressions: 





 Service Date/Time:  Saturday, May 20, 2017 14:54 - CONCLUSION: No acute 





 cardiopulmonary disease identified.     Samuel De La Vega MD 








Objective Remarks


GENERAL: This is a well-nourished, well-developed patient, in no apparent 

distress.  Awake and alert.


SKIN: No rashes, ecchymoses or lesions. Cool and dry.


HEAD: Atraumatic. Normocephalic. No temporal or scalp tenderness.


EYES: Pupils equal round and reactive. Extraocular motions intact. No scleral 

icterus. No injection or drainage. 


ENT: Nose without bleeding, purulent drainage or septal hematoma. Throat 

without erythema, tonsillar hypertrophy or exudate. Uvula midline. Airway 

patent.


NECK: Trachea midline. No lymphadenopathy. Supple, nontender, no meningeal 

signs.


CARDIOVASCULAR: Regular rate and rhythm.  (+)3/6 holosystolic murmur radiating 

into the carotids.


RESPIRATORY: Clear to auscultation. Breath sounds equal bilaterally. No wheezes

, rales, or rhonchi.  


GASTROINTESTINAL: Abdomen soft, non-tender, nondistended. No hepato-splenomegaly

, or palpable masses. No guarding.


MUSCULOSKELETAL: Extremities without clubbing, cyanosis, or edema. No joint 

tenderness, effusion, or edema noted. No calf tenderness. BL knees look 

deformed.


NEUROLOGICAL: Awake and alert. Able to move all extremities.  Weakness LUE and 

LLE.  (+)weak left  strength.  Normal speech.


Medications and IVs





 Current Medications








 Medications


  (Trade)  Dose


 Ordered  Sig/Lashawn


 Route  Start Time


 Stop Time Status Last Admin


 


  (Milk Of


 Magnesia Liq)  30 ml  DAILY  PRN


 PO  5/21/17 16:15


     


 


 


  (Mag-Al Plus


 Susp Liq)  30 ml  Q6H  PRN


 PO  5/21/17 16:15


     


 


 


  (Habitrol 21 Mg


 Patch.24 Hr)  1 patch  DAILY


 T-DERMAL  5/21/17 16:30


     


 


 


 Miscellaneous


 Information  1  DAILY


 T-DERMAL  5/22/17 09:00


     


 


 


  (Cymbalta Dr)  60 mg  BID


 PO  5/24/17 21:00


    5/28/17 08:15


 


 


  (Romazicon Inj)  0.2 mg  Q1M  PRN


 IV PUSH  5/24/17 13:45


     


 


 


  (Ativan)  1 mg  Q4H  PRN


 PO  5/24/17 13:45


    5/24/17 22:29


 


 


  (Ativan Inj)  1 mg  Q4H  PRN


 IV PUSH  5/24/17 13:45


     


 


 


  (Ativan)  2 mg  Q2H  PRN


 PO  5/24/17 13:45


     


 


 


  (Ativan Inj)  2 mg  Q2H  PRN


 IV PUSH  5/24/17 13:45


     


 


 


  (Ativan Inj)  2 mg  Q1H  PRN


 IV PUSH  5/24/17 13:45


     


 


 


  (Ativan Inj)  2 mg  Q15M  PRN


 IV PUSH  5/24/17 13:45


     


 


 


  (Prinivil)  20 mg  DAILY


 PO  5/25/17 09:00


    5/28/17 08:15


 


 


  (Microzide)  12.5 mg  DAILY


 PO  5/25/17 09:00


    5/28/17 08:15


 


 


  (Oramorph Sr)  30 mg  TID


 PO  5/25/17 09:00


    5/28/17 13:26


 


 


  (Percocet 


 Mg)  1 tab  Q6H  PRN


 PO  5/24/17 22:45


    5/28/17 10:51


 


 


  (Wellbutrin Sr


 12 Hr)  100 mg  BID@09,15


 PO  5/26/17 15:00


    5/28/17 14:23


 


 


  (Tears Naturale


 Opth Soln)  1 drop  Q4H  PRN


 EACH EYE  5/27/17 15:00


    5/28/17 06:04


 


 


  (KlonoPIN)  1.5 mg  Q12HR


 PO  5/28/17 21:00


     


 











A/P


Assessment and Plan


55 yo male with past medical history of previous hypertension, aortic valve 

stenosis, depression, anxiety and chronic neck and low back pain s/p cervical 

fusion x 3 and lumbar fusion who was admitted to the psychiatric unit due to 

suicidal ideation with plan and attempt.  Hospitalist services were consulted 

for medical management.





Depression/Anxiety/Suicide Attempt


   - Management of her psychiatric team





Hypertension/CAD


   - stress test done 4/20/17 without e/o ischemia


   - Continue home antihypertensive medications


   - Monitor BP and adjust treatment accordingly





Aortic valve stenosis


   - Per review of medical record, patient had previous echocardiogram done 04/ 20/17 showing EF 55-65% with no motion abnormalities and mild to moderate 

aortic valve stenosis





Chronic neck and low back pain s/p previous cervical and lumbar fusions with 

weak LUE and LLE as well as burning numb sensation in hands


   - patient reports dropping objects from left hand


   - cervical surgeries done decades ago without any recent follow up


   - MRI Cervical and Lumbar spine as above


   - confirmed medication regimen thru [facility]  - Continue Oramorph 30 mg PO 

TID and Percocet 10/325 mg Q 6 hrs as needed.


   - Given lumbar MRI findings and lower and upper extremity weakness 

neurosurgery has been consulted - recommendations pending.


   - discussed case with RN - consult was placed 5/24 but still patient not 

seen - will place another consult


   - DC prn Percocet and will change timing of Oramorph to every 8 hrs to get a 

more even schedule of the medications.





Episode of chest pain last night and early this am per nursing staff


   - now resolved


   - Dr. Esqueda personally reviewed EKG revealing NSR and nonspecific T wave 

findings


   - troponins negative x 2


   - will continue to monitor for recurrence





Ongoing tobacco use


   - Nicotine patch


   - discussed smoking cessation/counseling offered





BL Knee pain


   - X rays of knees as above show degenerative changes on both, severe on the 

left.


   -Patient has h/o arthroscopic knee surgery - will check knee x rays.


   -Patient will need to be referred to an orthopedic surgeon after discharge.





DVT prophylaxis


   - Encourage ambulation








Edgar Branch MD May 28, 2017 11:56

## 2017-05-29 VITALS
RESPIRATION RATE: 18 BRPM | HEART RATE: 72 BPM | TEMPERATURE: 98.4 F | SYSTOLIC BLOOD PRESSURE: 114 MMHG | DIASTOLIC BLOOD PRESSURE: 56 MMHG | OXYGEN SATURATION: 96 %

## 2017-05-29 RX ADMIN — BUPROPION HYDROCHLORIDE SCH MG: 100 TABLET, FILM COATED ORAL at 14:55

## 2017-05-29 RX ADMIN — NICOTINE SCH PATCH: 21 PATCH, EXTENDED RELEASE TOPICAL at 09:00

## 2017-05-29 RX ADMIN — BUPROPION HYDROCHLORIDE SCH MG: 100 TABLET, FILM COATED ORAL at 09:27

## 2017-05-29 RX ADMIN — LISINOPRIL SCH MG: 20 TABLET ORAL at 09:26

## 2017-05-29 RX ADMIN — DULOXETINE SCH MG: 60 CAPSULE, DELAYED RELEASE ORAL at 09:26

## 2017-05-29 RX ADMIN — HYDROCHLOROTHIAZIDE SCH MG: 12.5 CAPSULE ORAL at 09:25

## 2017-05-29 RX ADMIN — MORPHINE SULFATE SCH MG: 30 TABLET, EXTENDED RELEASE ORAL at 12:57

## 2017-05-29 RX ADMIN — MORPHINE SULFATE SCH MG: 30 TABLET, EXTENDED RELEASE ORAL at 04:49

## 2017-05-29 NOTE — HHI.PR
Subjective


Remarks


Pain is controlled


denies cp/sob


Weakness in left upper and lower extremities is stable


numbness on hands is stable





Objective


Vitals





 Vital Signs








  Date Time  Temp Pulse Resp B/P Pulse Ox O2 Delivery O2 Flow Rate FiO2


 


5/29/17 05:57 98.4 72 18 114/56 96   


 


5/28/17 18:22 98.0 56 18 103/61 97   








Imaging





Last Impressions








Knee X-Ray 5/27/17 0000 Signed





Impressions: 





 Service Date/Time:  Saturday, May 27, 2017 20:50 - CONCLUSION:  Extensive 





 degenerative changes with intraarticular loose bodies.   Pranav Gunter MD  





 FACR


 


Lumbar Spine MRI 5/24/17 0000 Signed





Impressions: 





 Service Date/Time:  Wednesday, May 24, 2017 13:01 - CONCLUSION:  Multilevel 

disc 





 abnormalities. The presumed disc fragment in a left paracentral location at L4-

5 





 may account for left leg radicular symptoms. Correlation recommended. 





 Acentrically right-sided findings at the L1-2 and L2-3 levels as described     





 Giles Eli MD 


 


Cervical Spine MRI 5/24/17 0000 Signed





Impressions: 





 Service Date/Time:  Wednesday, May 24, 2017 13:01 - CONCLUSION:  Previous 





 multilevel cervical fusion. Mild disc abnormalities above the previous fusion 





 with asymmetrically right-sided foraminal compromise at C3-4. No specific 





 explanation for left upper extremity radicular symptoms.     Giles Eli MD 


 


Chest X-Ray 5/20/17 1453 Signed





Impressions: 





 Service Date/Time:  Saturday, May 20, 2017 14:54 - CONCLUSION: No acute 





 cardiopulmonary disease identified.     Samuel De La Vega MD 








Objective Remarks


GENERAL: This is a well-nourished, well-developed patient, in no apparent 

distress.  Awake and alert.


SKIN: No rashes, ecchymoses or lesions. Cool and dry.


HEAD: Atraumatic. Normocephalic. No temporal or scalp tenderness.


EYES: Pupils equal round and reactive. Extraocular motions intact. No scleral 

icterus. No injection or drainage. 


ENT: Nose without bleeding, purulent drainage or septal hematoma. Throat 

without erythema, tonsillar hypertrophy or exudate. Uvula midline. Airway 

patent.


NECK: Trachea midline. No lymphadenopathy. Supple, nontender, no meningeal 

signs.


CARDIOVASCULAR: Regular rate and rhythm.  (+)3/6 holosystolic murmur radiating 

into the carotids.


RESPIRATORY: Clear to auscultation. Breath sounds equal bilaterally. No wheezes

, rales, or rhonchi.  


GASTROINTESTINAL: Abdomen soft, non-tender, nondistended. No hepato-splenomegaly

, or palpable masses. No guarding.


MUSCULOSKELETAL: Extremities without clubbing, cyanosis, or edema. No joint 

tenderness, effusion, or edema noted. No calf tenderness. BL knees look 

deformed.


NEUROLOGICAL: Awake and alert. Able to move all extremities.  Weakness LUE and 

LLE.  (+)weak left  strength.  Normal speech.


Medications and IVs





 Current Medications








 Medications


  (Trade)  Dose


 Ordered  Sig/Lashawn


 Route  Start Time


 Stop Time Status Last Admin


 


  (Milk Of


 Magnesia Liq)  30 ml  DAILY  PRN


 PO  5/21/17 16:15


     


 


 


  (Mag-Al Plus


 Susp Liq)  30 ml  Q6H  PRN


 PO  5/21/17 16:15


     


 


 


  (Habitrol 21 Mg


 Patch.24 Hr)  1 patch  DAILY


 T-DERMAL  5/21/17 16:30


     


 


 


 Miscellaneous


 Information  1  DAILY


 T-DERMAL  5/22/17 09:00


     


 


 


  (Cymbalta Dr)  60 mg  BID


 PO  5/24/17 21:00


    5/29/17 09:26


 


 


  (Romazicon Inj)  0.2 mg  Q1M  PRN


 IV PUSH  5/24/17 13:45


     


 


 


  (Ativan)  1 mg  Q4H  PRN


 PO  5/24/17 13:45


    5/28/17 21:56


 


 


  (Ativan Inj)  1 mg  Q4H  PRN


 IV PUSH  5/24/17 13:45


     


 


 


  (Ativan)  2 mg  Q2H  PRN


 PO  5/24/17 13:45


     


 


 


  (Ativan Inj)  2 mg  Q2H  PRN


 IV PUSH  5/24/17 13:45


     


 


 


  (Ativan Inj)  2 mg  Q1H  PRN


 IV PUSH  5/24/17 13:45


     


 


 


  (Ativan Inj)  2 mg  Q15M  PRN


 IV PUSH  5/24/17 13:45


     


 


 


  (Prinivil)  20 mg  DAILY


 PO  5/25/17 09:00


    5/29/17 09:26


 


 


  (Microzide)  12.5 mg  DAILY


 PO  5/25/17 09:00


    5/29/17 09:25


 


 


  (Wellbutrin Sr


 12 Hr)  100 mg  BID@09,15


 PO  5/26/17 15:00


    5/29/17 09:27


 


 


  (Tears Naturale


 Opth Soln)  1 drop  Q4H  PRN


 EACH EYE  5/27/17 15:00


    5/28/17 20:38


 


 


  (KlonoPIN)  1.5 mg  Q12HR


 PO  5/28/17 21:00


    5/29/17 09:25


 


 


  (Oramorph Sr)  30 mg  Q8H


 PO  5/28/17 21:00


    5/29/17 12:57


 








Urinary Catheter:  No


Vascular Central Line Catheter:  No





A/P


Assessment and Plan


55 yo male with past medical history of previous hypertension, aortic valve 

stenosis, depression, anxiety and chronic neck and low back pain s/p cervical 

fusion x 3 and lumbar fusion who was admitted to the psychiatric unit due to 

suicidal ideation with plan and attempt.  Hospitalist services were consulted 

for medical management.





Depression/Anxiety/Suicide Attempt


   - Management of her psychiatric team





Hypertension/CAD


   - stress test done 4/20/17 without e/o ischemia


   - Continue home antihypertensive medications


   - BP stable





Aortic valve stenosis


   - Per review of medical record, patient had previous echocardiogram done 04/ 20/17 showing EF 55-65% with no motion abnormalities and mild to moderate 

aortic valve stenosis





Chronic neck and low back pain s/p previous cervical and lumbar fusions with 

weak LUE and LLE as well as burning numb sensation in hands


   - patient reports dropping objects from left hand


   - cervical surgeries done decades ago without any recent follow up


   - MRI Cervical and Lumbar spine as above


   - confirmed medication regimen thru [facility]  - Continue Oramorph 30 mg PO 

TID and Percocet 10/325 mg Q 6 hrs as needed.


   - Given lumbar MRI findings and lower and upper extremity weakness 

neurosurgery has been consulted 


   - MRI as described above. Neurosurgery recommended outpatient follow up as 

well as outpatient neurology and EMG consultation.


   - Continue pain management as an outpatient





Episode of chest pain last night and early this am per nursing staff


   - now resolved


   -  EKG revealing NSR and nonspecific T wave findings


   - troponins negative x 2


   - will continue to monitor for recurrence





Ongoing tobacco use


   - Nicotine patch


   - discussed smoking cessation/counseling offered





BL Knee pain


   - X rays of knees as above show degenerative changes on both, severe on the 

left.


   -Patient has h/o arthroscopic knee surgery - will check knee x rays.


   -Knee x rays shows severe arthritis - Advised patient to get outpatient 

orthopedic surgery follow up. Continue Pain management.


   -Orthopedic surgery consulted - Dr Phillip spoke with RN and recommended 

outpatient follow up.





DVT prophylaxis


   - Encourage ambulation


Discharge Planning


The patient is medically clear to be discharged - will need outpatient neurology

, neurosurgery and orthopedic surgery.








Edgar Branch MD May 29, 2017 14:06

## 2017-05-29 NOTE — HHI.DS
Psychiatry Discharge Summary


Inpatient Psychiatric care?:  Yes


Advance Directive:  No


Reason Not Provided:  DOES NOT WANT TO


Mental Health AdvanceDirective:  No


Health Care Proxy:  No


Admission


Admission Date


May 21, 2017 at 16:11


Admission Diagnosis:  


(1) Adjustment disorder with mixed disturbance of emotions and conduct


ICD Code:  F43.25


Brief History


56-year-old male with a multiyear history of pain complaints secondary to 

lumbar fusion, presents to the emergency room voluntarily for admission due to 

suicidal ideation with plan and attempt.  Patient apparently stepped out in 

front of traffic 2 or 3 days ago and continues to think about committing 

suicide.  Reports an additional stressor of his mother having  recently.  

Describes a 2+ week history of depressed mood, anhedonia, suicidal ideation, 

anxiety, social withdrawal, diminished self-esteem, poor sleeping, diminished 

energy, problems with concentration, etc.  Patient states he has been compliant 

with his Cymbalta and he has a history of taking Xanax, that his medicines are 

not working for him.  At the time of this admission, the patient is found to be 

tremulous and anxious as well as depressed and in pain.


Tobacco Use In Past 30 Days:  5 or More Cigarettes/Day


Alcohol Use:  Never


Hospital Course


Patient was admitted to a locked, inpatient psychiatric unit.  A general 

medical and neurosurgical consultation were obtained.  Appropriate precautions 

were in place throughout patient's hospital stay.  Patient was seen and 

examined daily on the unit by psychiatry and also visited by counselor.  

Psychotropic medications were adjusted.  Patient tolerated medication changes 

well without side effects.  Patient had improvement in his presenting 

psychiatric symptomatology although it was noted that he did tend to present 

considerably better when surreptitiously observed then went directly observed 

throughout the hospital stay.  The patient was noted to be medication seeking.  

It was suspected by myself and other members of the treatment team that he was 

exaggerating if not outright malingering his psychiatric symptoms in service of 

remaining on the inpatient unit and having ready access to his controlled 

substances.  There was no evidence of any suicidality or homicidality on the 

inpatient unit.  Patient remained in good behavioral control and was compliant 

with medications.  Charting indicates that he is sleeping and eating well.  On 

the day of discharge: Patient seen and examined with counselor and nurse.  

Chart reviewed.  Case discussed with nursing staff who reports that the patient 

is medication seeking for opiate pain medications but otherwise has been no 

behavioral problem.  On my examination today, the patient reports that his mood 

is somewhat improved versus admission.  He denies any active suicidal ideation, 

intent or plan on direct questioning although he does take great pains to 

obliquely insinuate that he may, at some point over the weekend, have had such 

thoughts.  He denies any homicidal ideation.  No hypomanic/manic symptoms.  

Sleep and appetite subjectively fair.  Denies audiovisual hallucinations and I 

can elicit no delusional beliefs.  No side effects from medications.  No new 

physical complaints.  Weighing the acute, chronic, and protective factors and 

based on the available evidence, I  to a reasonable degree of medical 

certainty that the patient is at low imminent risk of harm to self or others 

from mental illness as defined under the [place] act and his level of function is 

adequate for outpatient care.  I do detect a distinct strain of 

manipulativeness in the patient's presentation that bespeaks an antisocial 

personality style, and this personality style would confer chronic risk for harm

, but this risk would not be ameliorated by a longer inpatient hospital stay.  

The patient has maximized benefit from this inpatient psychiatric hospital 

stay.  He will be discharged today with psychiatric follow-up as arranged by 

counselor.  He is also to follow-up with primary care, orthopedics, neurology 

and pain management.  I have counseled the patient regarding warning signs for 

need to return to the psychiatric emergency room as part of a general safety 

plan.  I have provided the patient with prescriptions for his psychotropics in 

the smallest quantity consistent with good care, and in particular I have 

provided the patient with a one-week supply of his benzodiazepine as we had 

agreed when I assumed care of his case.





Results


Blood Pressure


114 / 56





 Vital Signs








  Date Time  Temp Pulse Resp B/P Pulse Ox O2 Delivery O2 Flow Rate FiO2


 


17 05:57 98.4 72 18 114/56 96   

















Item Value  Date Time


 


White Blood Count 7.4 TH/MM3 17 1530


 


Hemoglobin 13.4 GM/DL 17 1530


 


Platelet Count 266 TH/MM3 17 1530


 


Sodium Level 141 MEQ/L 17 0704


 


Potassium Level 3.5 MEQ/L 17 0704


 


Chloride Level 106 MEQ/L 17 0704


 


Carbon Dioxide Level 28.0 MEQ/L 17 0704


 


Blood Urea Nitrogen 15 MG/DL 17 0704


 


Creatinine 1.08 MG/DL 17 0704


 


Estimat Glomerular Filtration Rate 70 ML/MIN L 17 0704


 


Total Bilirubin 0.5 MG/DL 17 1530


 


Aspartate Amino Transf (AST/SGOT) 11 U/L L 17 1530


 


Alanine Aminotransferase (ALT/SGPT) 15 U/L 17 1530


 


Alkaline Phosphatase 74 U/L 17 1530


 


Urine Opiates Screen POS H 17 1612


 


Urine Barbiturates Screen NEG 17 1612


 


Urine Amphetamines Screen NEG 17 1612


 


Urine Benzodiazepines Screen POS H 17 1612


 


Urine Cocaine Screen NEG 17 1612


 


Urine Cannabinoids Screen NEG 17 1612








Summary of Procedures


None done


Imaging





Last Impressions








Knee X-Ray 17 0000 Signed





Impressions: 





 Service Date/Time:  Saturday, May 27, 2017 20:50 - CONCLUSION:  Extensive 





 degenerative changes with intraarticular loose bodies.   Pranav Gunter MD  





 FACR


 


Lumbar Spine MRI 17 0000 Signed





Impressions: 





 Service Date/Time:  Wednesday, May 24, 2017 13:01 - CONCLUSION:  Multilevel 

disc 





 abnormalities. The presumed disc fragment in a left paracentral location at L4-

5 





 may account for left leg radicular symptoms. Correlation recommended. 





 Acentrically right-sided findings at the L1-2 and L2-3 levels as described     





 Giles Eli MD 


 


Cervical Spine MRI 17 0000 Signed





Impressions: 





 Service Date/Time:  Wednesday, May 24, 2017 13:01 - CONCLUSION:  Previous 





 multilevel cervical fusion. Mild disc abnormalities above the previous fusion 





 with asymmetrically right-sided foraminal compromise at C3-4. No specific 





 explanation for left upper extremity radicular symptoms.     Giles Eli MD 


 


Chest X-Ray 17 1453 Signed





Impressions: 





 Service Date/Time:  Saturday, May 20, 2017 14:54 - CONCLUSION: No acute 





 cardiopulmonary disease identified.     Samuel De La Vega MD 








Pending results at discharge:  No





Medications


# of Antipsychotic meds at D/C:  0


Approp Antipsych med options


1 - Minimum of three failed multiple trials of monotherapy.


2 - Documented plan to taper to monotherapy due to previous use of multiple 

meds OR cross-taper in progress at D/C.


3 - Documentation of augmentation of Clozapine.


4 - Justification other than those listed in allowable values 1-3, document here

:





Discharge


Discharge Date:  May 29, 2017


Discharge Diagnosis:  


(1) Adjustment disorder with mixed disturbance of emotions and conduct


Diagnosis:  Principal (improved versus admission)


ICD Code:  F43.25


(2) Malingering


Diagnosis:  Secondary (Suspected, for ready access to controlled substances)


ICD Code:  Z76.5


GAF on discharge is 55


Mental Status Exam at Disch


Patient is casually dressed.  He is well groomed and certainly maintaining 

basic hygiene.  He is awake and alert and oriented to person and hospital at 

least.  No evidence of delirium.  No abnormal motor movements noted.  Speech is 

within normal limits for rate, tone and volume.  Language and fund of knowledge 

seemed average.  Mood is reportedly improved versus admission although it does 

remain somewhat depressed by his report.  Affect is somewhat blunted.  Thought 

process linear.  No loosening of associations.  No evident delusions.  Denies 

audiovisual hallucinations.  Denies suicidal or homicidal ideation, intent or 

plan.  Insight and judgment are fair.


Pt Condition on Discharge:  Stable


Discharge Disposition:  Discharge Home





Discharge Instructions


Diet Instructions:  As Tolerated, No Restrictions


Activities you can perform:  Weight Bearing as Dione


Scheduled Appointment:  as per counselor's notes


New Medications:  


Bupropion HCl ER 12 HR (Wellbutrin SR 12 HR) 100 Mg Tab


100 MG PO BID@09,15 Mental Health Days 7 Ref 3 TAB


Clonazepam (Klonopin) 0.5 Mg Tab


1.5 MG PO Q12HR Mental Health Days 7 Ref 0 TAB


Duloxetine DR (Duloxetine DR) 60 Mg Capdr


60 MG PO BID Mental Health Days 7 Ref 3 CAP


 


Continued Medications:  


Lisinopril-Hctz (Zestoretic) 20-12.5 Mg Tab


1 TAB PO DAILY Blood Pressure Management #30 Ref 0 TAB


Morphine ER (Ms Contin) 30 Mg Tab


30 MG PO TID Pain Management Ref 0 TAB


Oxycodone-Acetaminophen (Percocet)  mg Tab


1 TAB PO Q6H PRN PAIN Ref 0 TAB


 


Discontinued Medications:  


Alprazolam (Alprazolam) 1 Mg Tab


1 MG PO QID PRN ANXIETY Ref 0 TAB


Duloxetine DR (Cymbalta DR) 60 Mg Capdr


60 MG PO DAILY #30 Ref 0 CAP


Tizanidine (Zanaflex) 4 Mg Cap


4 MG PO TID Muscle Spasm Ref 0 CAP








Discharge Time


> 30 minutes





Discharge/Advance Care Plan


Health Problems:  


(1) Adjustment disorder with mixed disturbance of emotions and conduct


Goals to promote your health


* To prevent worsening of your condition and complications


* To maintain your health at the optimal level


Directions to meet your goals


*** Take your medications as prescribed


***  Follow your dietary instruction


***  Follow activity as directed





***  Keep your appointments as scheduled


***  Take your immunizations and boosters as scheduled


***  If your symptoms worsen call your PCP, if no PCP go to Urgent Care Center 

or Emergency Room ***


***  For  questions related to your inpatient stay or results of tests 

pending at discharge, please contact Dr. Bj Grimes at [phone]


***  Smoking is Dangerous to Your Health. Avoid second hand smoking ***








Bj Grimes MD May 29, 2017 10:05

## 2017-11-02 ENCOUNTER — HOSPITAL ENCOUNTER (INPATIENT)
Dept: HOSPITAL 17 - HOR | Age: 57
LOS: 1 days | Discharge: HOME | DRG: 343 | End: 2017-11-03
Attending: HOSPITALIST | Admitting: HOSPITALIST
Payer: MEDICARE

## 2017-11-02 VITALS
SYSTOLIC BLOOD PRESSURE: 156 MMHG | TEMPERATURE: 96.9 F | HEART RATE: 70 BPM | DIASTOLIC BLOOD PRESSURE: 76 MMHG | RESPIRATION RATE: 17 BRPM | OXYGEN SATURATION: 97 %

## 2017-11-02 VITALS
OXYGEN SATURATION: 95 % | TEMPERATURE: 97.3 F | RESPIRATION RATE: 17 BRPM | SYSTOLIC BLOOD PRESSURE: 144 MMHG | HEART RATE: 55 BPM | DIASTOLIC BLOOD PRESSURE: 80 MMHG

## 2017-11-02 VITALS — HEIGHT: 70 IN | BODY MASS INDEX: 28.41 KG/M2 | WEIGHT: 198.42 LBS

## 2017-11-02 VITALS
DIASTOLIC BLOOD PRESSURE: 67 MMHG | TEMPERATURE: 96.1 F | SYSTOLIC BLOOD PRESSURE: 142 MMHG | RESPIRATION RATE: 16 BRPM | OXYGEN SATURATION: 96 % | HEART RATE: 52 BPM

## 2017-11-02 DIAGNOSIS — Z72.0: ICD-10-CM

## 2017-11-02 DIAGNOSIS — G89.29: ICD-10-CM

## 2017-11-02 DIAGNOSIS — F43.10: ICD-10-CM

## 2017-11-02 DIAGNOSIS — K35.80: Primary | ICD-10-CM

## 2017-11-02 DIAGNOSIS — I10: ICD-10-CM

## 2017-11-02 DIAGNOSIS — I35.0: ICD-10-CM

## 2017-11-02 PROCEDURE — 0DTJ4ZZ RESECTION OF APPENDIX, PERCUTANEOUS ENDOSCOPIC APPROACH: ICD-10-PCS | Performed by: SURGERY

## 2017-11-02 PROCEDURE — 80053 COMPREHEN METABOLIC PANEL: CPT

## 2017-11-02 PROCEDURE — 74176 CT ABD & PELVIS W/O CONTRAST: CPT

## 2017-11-02 PROCEDURE — 81001 URINALYSIS AUTO W/SCOPE: CPT

## 2017-11-02 PROCEDURE — 83690 ASSAY OF LIPASE: CPT

## 2017-11-02 PROCEDURE — 88304 TISSUE EXAM BY PATHOLOGIST: CPT

## 2017-11-02 PROCEDURE — 93005 ELECTROCARDIOGRAM TRACING: CPT

## 2017-11-02 PROCEDURE — 84484 ASSAY OF TROPONIN QUANT: CPT

## 2017-11-02 PROCEDURE — 80048 BASIC METABOLIC PNL TOTAL CA: CPT

## 2017-11-02 PROCEDURE — 85025 COMPLETE CBC W/AUTO DIFF WBC: CPT

## 2017-11-02 PROCEDURE — 71010: CPT

## 2017-11-02 RX ADMIN — PHENYTOIN SODIUM SCH MLS/HR: 50 INJECTION INTRAMUSCULAR; INTRAVENOUS at 17:00

## 2017-11-02 RX ADMIN — TAZOBACTAM SODIUM AND PIPERACILLIN SODIUM SCH MLS/HR: 500; 4 INJECTION, SOLUTION INTRAVENOUS at 18:00

## 2017-11-02 RX ADMIN — MORPHINE SULFATE PRN MG: 2 INJECTION, SOLUTION INTRAMUSCULAR; INTRAVENOUS at 22:16

## 2017-11-02 RX ADMIN — Medication SCH ML: at 20:31

## 2017-11-02 RX ADMIN — PHENYTOIN SODIUM SCH MLS/HR: 50 INJECTION INTRAMUSCULAR; INTRAVENOUS at 21:12

## 2017-11-02 RX ADMIN — TAZOBACTAM SODIUM AND PIPERACILLIN SODIUM SCH MLS/HR: 500; 4 INJECTION, SOLUTION INTRAVENOUS at 23:30

## 2017-11-02 NOTE — PD.CONS
cc:   Geovanni Alvarado MD


__________________________________________________





[facility]


Service


General Surgery


Consult Requested By


Dr. Ruiz


Reason for Consult


Acute appendicitis


Primary Care Physician


No Primary Care Physician


History of Present Illness


This is a 56-year-old male with a past medical history of chronic pain status 

post trauma about 20 years ago on chronic MS Contin and Percocet, PTSD/anxiety, 

hypertension and aortic stenosis.  The patient has had abdominal pain for about 

2-3 days with associated nausea vomiting.  He's been unable to tolerate much by 

mouth and has had a low appetite.  The patient denies any sick contacts.  The 

patient denies eating anything unusual.  The patient does report that several 

times in the last few years he has gone to the emergency department with 

similar type abdominal pain and a CT scan of the abdomen and pelvis was 

obtained which showed an inflammation of the appendix but the appendix was 

never removed nor has the patient been evaluated by a General Surgeon.  A 

General Surgery consultation has been requested for evaluation of acute 

appendicitis and possible laparoscopic appendectomy.





Review of Systems


Constitutional:  COMPLAINS OF: Change in appetite


Endocrine:  DENIES: Polydipsia, Polyuria, Polyphagia


Eyes:  DENIES: Blurred vision


Ears, nose, mouth, throat:  DENIES: Hearing loss


Respiratory:  DENIES: Apneas


Cardiovascular:  DENIES: Chest pain


Gastrointestinal:  COMPLAINS OF: Abdominal pain, Nausea, Vomiting, DENIES: 

Constipation, Diarrhea


Genitourinary:  DENIES: Urgency


Musculoskeletal:  DENIES: Joint pain


Integumentary:  DENIES: Abnormal pigmentation


Hematologic/lymphatic:  DENIES: Bruising


Immunologic/allergic:  DENIES: Eczema


Neurologic:  DENIES: Headache, Localized weakness


Psychiatric:  DENIES: Mood changes, Depression, Hallucinations





Past Family Social History


Past Medical History


Chronic pain


PTSD/anxiety


Hypertension


Aortic stenosis


Past Surgical History


Cardiac catheterization without stent placement


Neck and lumbar surgeries


Reported Medications


Xanax


Colace


Aspirin


Celebrex


Percocet


MS Contin


Allergies:  


Coded Allergies:  


     phenobarbital (Unverified  Allergy, Unknown, unknown, 11/2/17)


Active Ordered Medications





Current Medications








 Medications


  (Trade)  Dose


 Ordered  Sig/Lashawn


 Route  Start Time


 Stop Time Status Last Admin


 


 Sodium Chloride  1,000 ml @ 


 100 mls/hr  Q10H


 IV  11/2/17 17:00


     


 


 


  (NS Flush)  2 ml  UNSCH  PRN


 IV FLUSH  11/2/17 15:45


     


 


 


  (NS Flush)  2 ml  BID


 IV FLUSH  11/2/17 21:00


     


 


 


  (Narcan Inj)  0.4 mg  UNSCH  PRN


 IV PUSH  11/2/17 15:45


     


 


 


  (Morphine Inj)  2 mg  Q3H  PRN


 IV PUSH  11/2/17 15:45


     


 


 


  (Zofran Inj)  4 mg  Q6HR  PRN


 IV PUSH  11/2/17 15:45


     


 


 


 Piperacillin Sod/


 Tazobactam Sod  100 ml @ 


 200 mls/hr  Q6H


 IV  11/2/17 18:00


     


 








Family History


Mother with congestive heart failure, CABG 3 and stroke


Father with CHF


Social History


Positive tobacco use about 3-4 cigarettes daily for about 30 years


Denies any EtOH use


Denies any illicit drug use








Lives at home alone; independent with ADLs





Physical Exam


Vital Signs


Temperature 98.9


Heart rate 61


Blood pressure 152/72


Oxygen saturation 96% on room air


Physical Exam


GENERAL: A 56-year-old male resting in bed in moderate acute distress from 

abdominal pain.


SKIN: Warm and dry.


HEAD: Atraumatic. Normocephalic. 


EYES: Pupils equal and round. No scleral icterus. No injection or drainage. 


ENT: No nasal bleeding or discharge.  Mucous membranes pink and moist.


NECK: Trachea midline. 


CARDIOVASCULAR: Regular rate and rhythm.  


RESPIRATORY: No accessory muscle use. Clear to auscultation. Breath sounds 

equal bilaterally. 


GASTROINTESTINAL: Abdomen soft, nondistended. RLQ pain with palpation.  No 

visible scars or hernias on the abdomen.


MUSCULOSKELETAL: Extremities without clubbing, cyanosis, or edema.  Bilateral 

knee brace is in place.


NEUROLOGICAL: Awake and alert. No obvious cranial nerve deficits.  Motor 

grossly within normal limits. Five out of 5 muscle strength in the arms and 

legs.  Normal speech.


PSYCHIATRIC: Appropriate mood and affect; insight and judgment normal.


Laboratory


WBC 5.8 


Hemoglobin 13.1


Platelets 219


PT 10.8


INR 1.0 


 aPTT 26.6


Imaging


CT abdomen and pelvis from [place] reviewed and shows early acute appendicitis.





Assessment and Plan


Assessment and Plan


56-year-old male with acute appendicitis





-Plan for laparoscopic appendectomy this evening with Dr. Alvarado


-Please obtain consents


-NPO


-IVF


-IV antibiotics


-Pain control


-Procedure explained in detail and all questions answered


-Patient seen with RN Nissa 


-Thank you for this consult; We will continue to follow 


====================================


Attending Note - Dr. Alvarado





Acute appendicitis with RLQ tenderness, guarding and findings/CT consistent 

with appendicitis


To OR this evening


Risks, benefits, alternatives and convalescence discussed with patient; he 

vocalized understanding and agrees to proceed with surgery.


The exam, history, and the medical decision-making described in the above note 

were completed with the assistance of the mid-level provider. I reviewed and 

agree with the findings presented.  I attest that I had a face-to-face 

encounter with the patient on the same day, and personally performed and 

documented my assessment and findings in the medical record.


Discussed Condition With


Dr. Christiano Azar


Mr. Yuan RussellVandana Nov 2, 2017 17:39


Geovanni Alvarado MD Nov 2, 2017 21:07

## 2017-11-02 NOTE — PD.CONS
cc:   Geovanni Alvarado MD


__________________________________________________





[facility]


Service


General Surgery


Consult Requested By


Dr. Ruiz


Reason for Consult


Acute appendicitis


Primary Care Physician


No Primary Care Physician


History of Present Illness


This is a 56-year-old male with a past medical history of chronic pain status 

post trauma about 20 years ago on chronic MS Contin and Percocet, PTSD/anxiety, 

hypertension and aortic stenosis.  The patient has had abdominal pain for about 

2-3 days with associated nausea vomiting.  He's been unable to tolerate much by 

mouth and has had a low appetite.  The patient denies any sick contacts.  The 

patient denies eating anything unusual.  The patient does report that several 

times in the last few years he has gone to the emergency department with 

similar type abdominal pain and a CT scan of the abdomen and pelvis was 

obtained which showed an inflammation of the appendix but the appendix was 

never removed nor has the patient been evaluated by a General Surgeon.  A 

General Surgery consultation has been requested for evaluation of acute 

appendicitis and possible laparoscopic appendectomy.





Review of Systems


Constitutional:  COMPLAINS OF: Change in appetite


Endocrine:  DENIES: Polydipsia, Polyuria, Polyphagia


Eyes:  DENIES: Blurred vision


Ears, nose, mouth, throat:  DENIES: Hearing loss


Respiratory:  DENIES: Apneas


Cardiovascular:  DENIES: Chest pain


Gastrointestinal:  COMPLAINS OF: Abdominal pain, Nausea, Vomiting, DENIES: 

Constipation, Diarrhea


Genitourinary:  DENIES: Urgency


Musculoskeletal:  DENIES: Joint pain


Integumentary:  DENIES: Abnormal pigmentation


Hematologic/lymphatic:  DENIES: Bruising


Immunologic/allergic:  DENIES: Eczema


Neurologic:  DENIES: Headache, Localized weakness


Psychiatric:  DENIES: Mood changes, Depression, Hallucinations





Past Family Social History


Past Medical History


Chronic pain


PTSD/anxiety


Hypertension


Aortic stenosis


Past Surgical History


Cardiac catheterization without stent placement


Neck and lumbar surgeries


Reported Medications


Xanax


Colace


Aspirin


Celebrex


Percocet


MS Contin


Allergies:  


Coded Allergies:  


     phenobarbital (Unverified  Allergy, Unknown, unknown, 11/2/17)


Active Ordered Medications





Current Medications








 Medications


  (Trade)  Dose


 Ordered  Sig/Lashawn


 Route  Start Time


 Stop Time Status Last Admin


 


 Sodium Chloride  1,000 ml @ 


 100 mls/hr  Q10H


 IV  11/2/17 17:00


     


 


 


  (NS Flush)  2 ml  UNSCH  PRN


 IV FLUSH  11/2/17 15:45


     


 


 


  (NS Flush)  2 ml  BID


 IV FLUSH  11/2/17 21:00


     


 


 


  (Narcan Inj)  0.4 mg  UNSCH  PRN


 IV PUSH  11/2/17 15:45


     


 


 


  (Morphine Inj)  2 mg  Q3H  PRN


 IV PUSH  11/2/17 15:45


     


 


 


  (Zofran Inj)  4 mg  Q6HR  PRN


 IV PUSH  11/2/17 15:45


     


 


 


 Piperacillin Sod/


 Tazobactam Sod  100 ml @ 


 200 mls/hr  Q6H


 IV  11/2/17 18:00


     


 








Family History


Mother with congestive heart failure, CABG 3 and stroke


Father with CHF


Social History


Positive tobacco use about 3-4 cigarettes daily for about 30 years


Denies any EtOH use


Denies any illicit drug use








Lives at home alone; independent with ADLs





Physical Exam


Vital Signs


Temperature 98.9


Heart rate 61


Blood pressure 152/72


Oxygen saturation 96% on room air


Physical Exam


GENERAL: A 56-year-old male resting in bed in moderate acute distress from 

abdominal pain.


SKIN: Warm and dry.


HEAD: Atraumatic. Normocephalic. 


EYES: Pupils equal and round. No scleral icterus. No injection or drainage. 


ENT: No nasal bleeding or discharge.  Mucous membranes pink and moist.


NECK: Trachea midline. 


CARDIOVASCULAR: Regular rate and rhythm.  


RESPIRATORY: No accessory muscle use. Clear to auscultation. Breath sounds 

equal bilaterally. 


GASTROINTESTINAL: Abdomen soft, nondistended. RLQ pain with palpation.  No 

visible scars or hernias on the abdomen.


MUSCULOSKELETAL: Extremities without clubbing, cyanosis, or edema.  Bilateral 

knee brace is in place.


NEUROLOGICAL: Awake and alert. No obvious cranial nerve deficits.  Motor 

grossly within normal limits. Five out of 5 muscle strength in the arms and 

legs.  Normal speech.


PSYCHIATRIC: Appropriate mood and affect; insight and judgment normal.


Laboratory


WBC 5.8 


Hemoglobin 13.1


Platelets 219


PT 10.8


INR 1.0 


 aPTT 26.6


Imaging


CT abdomen and pelvis from [place] reviewed and shows early acute appendicitis.





Assessment and Plan


Assessment and Plan


56-year-old male with acute appendicitis





-Plan for laparoscopic appendectomy this evening with Dr. Alvarado


-Please obtain consents


-NPO


-IVF


-IV antibiotics


-Pain control


-Procedure explained in detail and all questions answered


-Patient seen with RN Nissa 


-Thank you for this consult; We will continue to follow 


====================================


Attending Note - Dr. Alvarado





Acute appendicitis with RLQ tenderness, guarding and findings/CT consistent 

with appendicitis


To OR this evening


Risks, benefits, alternatives and convalescence discussed with patient; he 

vocalized understanding and agrees to proceed with surgery.


The exam, history, and the medical decision-making described in the above note 

were completed with the assistance of the mid-level provider. I reviewed and 

agree with the findings presented.  I attest that I had a face-to-face 

encounter with the patient on the same day, and personally performed and 

documented my assessment and findings in the medical record.


Discussed Condition With


Dr. Christiano Azar


Mr. Yuan RussellVandana Nov 2, 2017 17:39


Geovanni Alvarado MD Nov 2, 2017 21:07

## 2017-11-02 NOTE — HHI.HP
__________________________________________________





\[facility] [place] [facility]\""


[facility]


[facility]


Primary Care Physician


No Primary Care Physician


Admission Diagnosis





Diagnoses:  


Travel History


International Travel<30 Days:  No


Contact w/Intl Traveler <30 Da:  No


Traveled to Known Affected Are:  No


History of Present Illness


56-year-old male with a past medical history significant for moderate aortic 

stenosis, followed by cardiology, chronic pain and anxiety presented with early 

appendicitis.  The patient hasn't had abdominal pain for approximately 2-3 days 

associated with nausea/vomiting.  He endorses anorexia associated with his 

symptoms.  CT of the abdomen/pelvis was significant for acute early 

appendicitis without perforation or abscess.  Patient was evaluated by general 

surgery and was taken for laparoscopic appendectomy earlier this evening.  

Patient states he has mild to moderate abdominal pain relieved with morphine.  

He is tolerating by mouth liquids.  He is very concerned about receiving his 

anxiety medications.





Review of Systems


Denies fever or chills


Denies blurry vision, otorrhea, rhinorrhea 


Denies sore throat and cough


No chest pain, palpitations, shortness of breath


Mild abdominal pain


Nausea/vomiting 2-3 days


Denies muscle pain/weakness


No rashes





Past Family Social History


Past Medical History


Chronic back pain


Anxiety/PTSD


Osteoarthritis


Depression


Aortic stenosis, patient follows with a cardiologist last seen 6 months ago


Past Surgical History


Cardiac catheterization 2 months ago; no stents placed


Neck fusion 3


L4-L5 discectomy


Right knee surgery 3


Hand surgery


Urethral repair


Reported Medications





Reported Meds & Active Scripts


Active


Duloxetine DR (Duloxetine HCl) 60 Mg Capdr 60 Mg PO BID 7 Days


Reported


Alprazolam 1 Mg Tab 1 Mg PO QID


Dulcolax Stool Softener (Docusate Sodium) 100 Mg Cap 100 Mg PO DAILY


Aspirin EC (Aspirin) 81 Mg Tabdr 81 Mg PO DAILY


Celebrex (Celecoxib) 200 Mg Cap 200 Mg PO DAILY


Percocet (Oxycodone-Acetaminophen)  mg Tab 1 Tab PO Q4HR PRN


Ms Contin (Morphine Sulfate) 30 Mg Tab 30 Mg PO BID


Allergies:  


Coded Allergies:  


     phenobarbital (Unverified  Allergy, Unknown, unknown, 17)


Family History


Dad with CHF, now .  Mom with breast cancer, CHF, CAD and CVA.


Social History


Smokes 2-5 cigarettes per day.  Has been smoking for 15 years.  Rare alcohol.  

Denies marijuana or illicit drugs





Physical Exam


Vital Signs





Vital Signs








  Date Time  Temp Pulse Resp B/P (MAP) Pulse Ox O2 Delivery O2 Flow Rate FiO2


 


17 22:00 96.1 52 16 142/67 (92) 96   


 


17 21:15 97.6 59 16 153/62 (92) 100 Room Air  


 


17 21:00 97.6 57 16 173/75 (107) 100 Nasal Cannula 3 


 


17 19:12 97.3 55 17 144/80 (101) 95   








Physical Exam


GENERAL: White male sitting up in bed


SKIN: No rashes, ecchymoses or lesions. Cool and dry.


HEAD: Atraumatic. Normocephalic. No temporal or scalp tenderness.


EYES: Pupils equal round and reactive. Extraocular motions intact. No scleral 

icterus. No injection or drainage. 


ENT: Nose without bleeding, purulent drainage or septal hematoma. Throat 

without erythema, tonsillar hypertrophy or exudate. Uvula midline. Airway 

patent.


NECK: Trachea midline. No JVD or lymphadenopathy. Supple, nontender, no 

meningeal signs.


CARDIOVASCULAR: Regular rate and rhythm.  Harsh, blowing systolic murmur.


RESPIRATORY: Clear to auscultation. Breath sounds equal bilaterally. No wheezes

, rales, or rhonchi.  


GASTROINTESTINAL: Abdomen soft, non-tender, nondistended.  Umbilical incision 

clean/dry/intact.


MUSCULOSKELETAL: Extremities without clubbing, cyanosis, or edema. No joint 

tenderness, effusion, or edema noted. No calf tenderness. Negative Homans sign 

bilaterally.


NEUROLOGICAL: Awake and alert. Cranial nerves II through XII intact.  Motor and 

sensory grossly within normal limits.  Normal speech.





Caprini VTE Risk Assessment


Caprini VTE Risk Assessment:  No/Low Risk (score <= 1)


Caprini Risk Assessment Model











 Point Value = 1          Point Value = 2  Point Value = 3  Point Value = 5


 


Age 41-60


Minor surgery


BMI > 25 kg/m2


Swollen legs


Varicose veins


Pregnancy or postpartum


History of unexplained or recurrent


   spontaneous 


Oral contraceptives or hormone


   replacement


Sepsis (< 1 month)


Serious lung disease, including


   pneumonia (< 1 month)


Abnormal pulmonary function


Acute myocardial infarction


Congestive heart failure (< 1 month)


History of inflammatory bowel disease


Medical patient at bed rest Age 61-74


Arthroscopic surgery


Major open surgery (> 45 min)


Laparoscopic surgery (> 45 min)


Malignancy


Confined to bed (> 72 hours)


Immobilizing plaster cast


Central venous access Age >= 75


History of VTE


Family history of VTE


Factor V Leiden


Prothrombin 70342O


Lupus anticoagulant


Anticardiolipin antibodies


Elevated serum homocysteine


Heparin-induced thrombocytopenia


Other congenital or acquired


   thrombophilia Stroke (< 1 month)


Elective arthroplasty


Hip, pelvis, or leg fracture


Acute spinal cord injury (< 1 month)








Prophylaxis Regimen











   Total Risk


Factor Score Risk Level Prophylaxis Regimen


 


0-1      Low Early ambulation


 


2 Moderate Order ONE of the following:


*Sequential Compression Device (SCD)


*Heparin 5000 units SQ BID


 


3-4 Higher Order ONE of the following medications:


*Heparin 5000 units SQ TID


*Enoxaparin/Lovenox 40 mg SQ daily (WT < 150 kg, CrCl > 30 mL/min)


*Enoxaparin/Lovenox 30 mg SQ daily (WT < 150 kg, CrCl > 10-29 mL/min)


*Enoxaparin/Lovenox 30 mg SQ BID (WT < 150 kg, CrCl > 30 mL/min)


AND/OR


*Sequential Compression Device (SCD)


 


5 or more Highest Order ONE of the following medications:


*Heparin 5000 units SQ TID (Preferred with Epidurals)


*Enoxaparin/Lovenox 40 mg SQ daily (WT < 150 kg, CrCl > 30 mL/min)


*Enoxaparin/Lovenox 30 mg SQ daily (WT < 150 kg, CrCl > 10-29 mL/min)


*Enoxaparin/Lovenox 30 mg SQ BID (WT < 150 kg, CrCl > 30 mL/min)


AND


*Sequential Compression Device (SCD)











Assessment and Plan


Assessment and Plan


56-year-old male with moderate aortic stenosis and anxiety/PTSD presents with 

early acute appendicitis





1.  Appendicitis


Status post laparoscopic appendectomy


Tolerating by mouth


Zosyn


General surgery following, appreciate recommendations





2.  Aortic stenosis


Moderate


Patient followed by cardiology





3.  Anxiety/PTSD


Continue home medications





FEN


NS at 100 cc/hour


Advance diet as tolerated per general surgery


Electrolytes within normal limits; follow


SCDs





Physician Certification


2 Midnight Certification Type:  Admission for Inpatient Services


Order for Inpatient Services


The services are ordered in accordance with Medicare regulations or non-

Medicare payer requirements, as applicable.  In the case of services not 

specified as inpatient-only, they are appropriately provided as inpatient 

services in accordance with the 2-midnight benchmark.


Estimated LOS (days):  2


2 days is the estimated time the patient will need to remain in the hospital, 

assuming treatment plan goals are met and no additional complications.


Post-Hospital Plan:  Home











Margaret Pedro MD 2017 23:01

## 2017-11-02 NOTE — PD.CONS
cc:   Geovanni Alvarado MD


__________________________________________________





[facility]


Service


General Surgery


Consult Requested By


Dr. Ruiz


Reason for Consult


Acute appendicitis


Primary Care Physician


No Primary Care Physician


History of Present Illness


This is a 56-year-old male with a past medical history of chronic pain status 

post trauma about 20 years ago on chronic MS Contin and Percocet, PTSD/anxiety, 

hypertension and aortic stenosis.  The patient has had abdominal pain for about 

2-3 days with associated nausea vomiting.  He's been unable to tolerate much by 

mouth and has had a low appetite.  The patient denies any sick contacts.  The 

patient denies eating anything unusual.  The patient does report that several 

times in the last few years he has gone to the emergency department with 

similar type abdominal pain and a CT scan of the abdomen and pelvis was 

obtained which showed an inflammation of the appendix but the appendix was 

never removed nor has the patient been evaluated by a General Surgeon.  A 

General Surgery consultation has been requested for evaluation of acute 

appendicitis and possible laparoscopic appendectomy.





Review of Systems


Constitutional:  COMPLAINS OF: Change in appetite


Endocrine:  DENIES: Polydipsia, Polyuria, Polyphagia


Eyes:  DENIES: Blurred vision


Ears, nose, mouth, throat:  DENIES: Hearing loss


Respiratory:  DENIES: Apneas


Cardiovascular:  DENIES: Chest pain


Gastrointestinal:  COMPLAINS OF: Abdominal pain, Nausea, Vomiting, DENIES: 

Constipation, Diarrhea


Genitourinary:  DENIES: Urgency


Musculoskeletal:  DENIES: Joint pain


Integumentary:  DENIES: Abnormal pigmentation


Hematologic/lymphatic:  DENIES: Bruising


Immunologic/allergic:  DENIES: Eczema


Neurologic:  DENIES: Headache, Localized weakness


Psychiatric:  DENIES: Mood changes, Depression, Hallucinations





Past Family Social History


Past Medical History


Chronic pain


PTSD/anxiety


Hypertension


Aortic stenosis


Past Surgical History


Cardiac catheterization without stent placement


Neck and lumbar surgeries


Reported Medications


Xanax


Colace


Aspirin


Celebrex


Percocet


MS Contin


Allergies:  


Coded Allergies:  


     phenobarbital (Unverified  Allergy, Unknown, unknown, 11/2/17)


Active Ordered Medications





Current Medications








 Medications


  (Trade)  Dose


 Ordered  Sig/Lashawn


 Route  Start Time


 Stop Time Status Last Admin


 


 Sodium Chloride  1,000 ml @ 


 100 mls/hr  Q10H


 IV  11/2/17 17:00


     


 


 


  (NS Flush)  2 ml  UNSCH  PRN


 IV FLUSH  11/2/17 15:45


     


 


 


  (NS Flush)  2 ml  BID


 IV FLUSH  11/2/17 21:00


     


 


 


  (Narcan Inj)  0.4 mg  UNSCH  PRN


 IV PUSH  11/2/17 15:45


     


 


 


  (Morphine Inj)  2 mg  Q3H  PRN


 IV PUSH  11/2/17 15:45


     


 


 


  (Zofran Inj)  4 mg  Q6HR  PRN


 IV PUSH  11/2/17 15:45


     


 


 


 Piperacillin Sod/


 Tazobactam Sod  100 ml @ 


 200 mls/hr  Q6H


 IV  11/2/17 18:00


     


 








Family History


Mother with congestive heart failure, CABG 3 and stroke


Father with CHF


Social History


Positive tobacco use about 3-4 cigarettes daily for about 30 years


Denies any EtOH use


Denies any illicit drug use








Lives at home alone; independent with ADLs





Physical Exam


Vital Signs


Temperature 98.9


Heart rate 61


Blood pressure 152/72


Oxygen saturation 96% on room air


Physical Exam


GENERAL: A 56-year-old male resting in bed in moderate acute distress from 

abdominal pain.


SKIN: Warm and dry.


HEAD: Atraumatic. Normocephalic. 


EYES: Pupils equal and round. No scleral icterus. No injection or drainage. 


ENT: No nasal bleeding or discharge.  Mucous membranes pink and moist.


NECK: Trachea midline. 


CARDIOVASCULAR: Regular rate and rhythm.  


RESPIRATORY: No accessory muscle use. Clear to auscultation. Breath sounds 

equal bilaterally. 


GASTROINTESTINAL: Abdomen soft, nondistended. RLQ pain with palpation.  No 

visible scars or hernias on the abdomen.


MUSCULOSKELETAL: Extremities without clubbing, cyanosis, or edema.  Bilateral 

knee brace is in place.


NEUROLOGICAL: Awake and alert. No obvious cranial nerve deficits.  Motor 

grossly within normal limits. Five out of 5 muscle strength in the arms and 

legs.  Normal speech.


PSYCHIATRIC: Appropriate mood and affect; insight and judgment normal.


Laboratory


WBC 5.8 


Hemoglobin 13.1


Platelets 219


PT 10.8


INR 1.0 


 aPTT 26.6


Imaging


CT abdomen and pelvis from [facility] reviewed and shows early acute appendicitis.





Assessment and Plan


Assessment and Plan


56-year-old male with acute appendicitis





-Plan for laparoscopic appendectomy this evening with Dr. Alvarado


-Please obtain consents


-NPO


-IVF


-IV antibiotics


-Pain control


-Procedure explained in detail and all questions answered


-Patient seen with RN Nissa 


-Thank you for this consult; We will continue to follow 


====================================


Attending Note - Dr. Alvarado





Acute appendicitis with RLQ tenderness, guarding and findings/CT consistent 

with appendicitis


To OR this evening


Risks, benefits, alternatives and convalescence discussed with patient; he 

vocalized understanding and agrees to proceed with surgery.


The exam, history, and the medical decision-making described in the above note 

were completed with the assistance of the mid-level provider. I reviewed and 

agree with the findings presented.  I attest that I had a face-to-face 

encounter with the patient on the same day, and personally performed and 

documented my assessment and findings in the medical record.


Discussed Condition With


Dr. Christiano Azar


Mr. Yuan RussellVandana Nov 2, 2017 17:39


Geovanni Alvarado MD Nov 2, 2017 21:07

## 2017-11-02 NOTE — HHI.HP
__________________________________________________





[facility]


Service


[facility]


Primary Care Physician


No Primary Care Physician


Admission Diagnosis





Diagnoses:  


Travel History


International Travel<30 Days:  No


Contact w/Intl Traveler <30 Da:  No


Traveled to Known Affected Are:  No


History of Present Illness


56-year-old male with a past medical history significant for moderate aortic 

stenosis, followed by cardiology, chronic pain and anxiety presented with early 

appendicitis.  The patient hasn't had abdominal pain for approximately 2-3 days 

associated with nausea/vomiting.  He endorses anorexia associated with his 

symptoms.  CT of the abdomen/pelvis was significant for acute early 

appendicitis without perforation or abscess.  Patient was evaluated by general 

surgery and was taken for laparoscopic appendectomy earlier this evening.  

Patient states he has mild to moderate abdominal pain relieved with morphine.  

He is tolerating by mouth liquids.  He is very concerned about receiving his 

anxiety medications.





Review of Systems


Denies fever or chills


Denies blurry vision, otorrhea, rhinorrhea 


Denies sore throat and cough


No chest pain, palpitations, shortness of breath


Mild abdominal pain


Nausea/vomiting 2-3 days


Denies muscle pain/weakness


No rashes





Past Family Social History


Past Medical History


Chronic back pain


Anxiety/PTSD


Osteoarthritis


Depression


Aortic stenosis, patient follows with a cardiologist last seen 6 months ago


Past Surgical History


Cardiac catheterization 2 months ago; no stents placed


Neck fusion 3


L4-L5 discectomy


Right knee surgery 3


Hand surgery


Urethral repair


Reported Medications





Reported Meds & Active Scripts


Active


Duloxetine DR (Duloxetine HCl) 60 Mg Capdr 60 Mg PO BID 7 Days


Reported


Alprazolam 1 Mg Tab 1 Mg PO QID


Dulcolax Stool Softener (Docusate Sodium) 100 Mg Cap 100 Mg PO DAILY


Aspirin EC (Aspirin) 81 Mg Tabdr 81 Mg PO DAILY


Celebrex (Celecoxib) 200 Mg Cap 200 Mg PO DAILY


Percocet (Oxycodone-Acetaminophen)  mg Tab 1 Tab PO Q4HR PRN


Ms Contin (Morphine Sulfate) 30 Mg Tab 30 Mg PO BID


Allergies:  


Coded Allergies:  


     phenobarbital (Unverified  Allergy, Unknown, unknown, 17)


Family History


Dad with CHF, now .  Mom with breast cancer, CHF, CAD and CVA.


Social History


Smokes 2-5 cigarettes per day.  Has been smoking for 15 years.  Rare alcohol.  

Denies marijuana or illicit drugs





Physical Exam


Vital Signs





Vital Signs








  Date Time  Temp Pulse Resp B/P (MAP) Pulse Ox O2 Delivery O2 Flow Rate FiO2


 


17 22:00 96.1 52 16 142/67 (92) 96   


 


17 21:15 97.6 59 16 153/62 (92) 100 Room Air  


 


17 21:00 97.6 57 16 173/75 (107) 100 Nasal Cannula 3 


 


17 19:12 97.3 55 17 144/80 (101) 95   








Physical Exam


GENERAL: White male sitting up in bed


SKIN: No rashes, ecchymoses or lesions. Cool and dry.


HEAD: Atraumatic. Normocephalic. No temporal or scalp tenderness.


EYES: Pupils equal round and reactive. Extraocular motions intact. No scleral 

icterus. No injection or drainage. 


ENT: Nose without bleeding, purulent drainage or septal hematoma. Throat 

without erythema, tonsillar hypertrophy or exudate. Uvula midline. Airway 

patent.


NECK: Trachea midline. No JVD or lymphadenopathy. Supple, nontender, no 

meningeal signs.


CARDIOVASCULAR: Regular rate and rhythm.  Harsh, blowing systolic murmur.


RESPIRATORY: Clear to auscultation. Breath sounds equal bilaterally. No wheezes

, rales, or rhonchi.  


GASTROINTESTINAL: Abdomen soft, non-tender, nondistended.  Umbilical incision 

clean/dry/intact.


MUSCULOSKELETAL: Extremities without clubbing, cyanosis, or edema. No joint 

tenderness, effusion, or edema noted. No calf tenderness. Negative Homans sign 

bilaterally.


NEUROLOGICAL: Awake and alert. Cranial nerves II through XII intact.  Motor and 

sensory grossly within normal limits.  Normal speech.





Caprini VTE Risk Assessment


Caprini VTE Risk Assessment:  No/Low Risk (score <= 1)


Caprini Risk Assessment Model











 Point Value = 1          Point Value = 2  Point Value = 3  Point Value = 5


 


Age 41-60


Minor surgery


BMI > 25 kg/m2


Swollen legs


Varicose veins


Pregnancy or postpartum


History of unexplained or recurrent


   spontaneous 


Oral contraceptives or hormone


   replacement


Sepsis (< 1 month)


Serious lung disease, including


   pneumonia (< 1 month)


Abnormal pulmonary function


Acute myocardial infarction


Congestive heart failure (< 1 month)


History of inflammatory bowel disease


Medical patient at bed rest Age 61-74


Arthroscopic surgery


Major open surgery (> 45 min)


Laparoscopic surgery (> 45 min)


Malignancy


Confined to bed (> 72 hours)


Immobilizing plaster cast


Central venous access Age >= 75


History of VTE


Family history of VTE


Factor V Leiden


Prothrombin 99268F


Lupus anticoagulant


Anticardiolipin antibodies


Elevated serum homocysteine


Heparin-induced thrombocytopenia


Other congenital or acquired


   thrombophilia Stroke (< 1 month)


Elective arthroplasty


Hip, pelvis, or leg fracture


Acute spinal cord injury (< 1 month)








Prophylaxis Regimen











   Total Risk


Factor Score Risk Level Prophylaxis Regimen


 


0-1      Low Early ambulation


 


2 Moderate Order ONE of the following:


*Sequential Compression Device (SCD)


*Heparin 5000 units SQ BID


 


3-4 Higher Order ONE of the following medications:


*Heparin 5000 units SQ TID


*Enoxaparin/Lovenox 40 mg SQ daily (WT < 150 kg, CrCl > 30 mL/min)


*Enoxaparin/Lovenox 30 mg SQ daily (WT < 150 kg, CrCl > 10-29 mL/min)


*Enoxaparin/Lovenox 30 mg SQ BID (WT < 150 kg, CrCl > 30 mL/min)


AND/OR


*Sequential Compression Device (SCD)


 


5 or more Highest Order ONE of the following medications:


*Heparin 5000 units SQ TID (Preferred with Epidurals)


*Enoxaparin/Lovenox 40 mg SQ daily (WT < 150 kg, CrCl > 30 mL/min)


*Enoxaparin/Lovenox 30 mg SQ daily (WT < 150 kg, CrCl > 10-29 mL/min)


*Enoxaparin/Lovenox 30 mg SQ BID (WT < 150 kg, CrCl > 30 mL/min)


AND


*Sequential Compression Device (SCD)











Assessment and Plan


Assessment and Plan


56-year-old male with moderate aortic stenosis and anxiety/PTSD presents with 

early acute appendicitis





1.  Appendicitis


Status post laparoscopic appendectomy


Tolerating by mouth


Zosyn


General surgery following, appreciate recommendations





2.  Aortic stenosis


Moderate


Patient followed by cardiology





3.  Anxiety/PTSD


Continue home medications





FEN


NS at 100 cc/hour


Advance diet as tolerated per general surgery


Electrolytes within normal limits; follow


SCDs





Physician Certification


2 Midnight Certification Type:  Admission for Inpatient Services


Order for Inpatient Services


The services are ordered in accordance with Medicare regulations or non-

Medicare payer requirements, as applicable.  In the case of services not 

specified as inpatient-only, they are appropriately provided as inpatient 

services in accordance with the 2-midnight benchmark.


Estimated LOS (days):  2


2 days is the estimated time the patient will need to remain in the hospital, 

assuming treatment plan goals are met and no additional complications.


Post-Hospital Plan:  Home











Margaret Pedro MD 2017 23:01

## 2017-11-02 NOTE — HHI.HP
__________________________________________________





[facility]


Service


[facility]


Primary Care Physician


No Primary Care Physician


Admission Diagnosis





Diagnoses:  


Travel History


International Travel<30 Days:  No


Contact w/Intl Traveler <30 Da:  No


Traveled to Known Affected Are:  No


History of Present Illness


56-year-old male with a past medical history significant for moderate aortic 

stenosis, followed by cardiology, chronic pain and anxiety presented with early 

appendicitis.  The patient hasn't had abdominal pain for approximately 2-3 days 

associated with nausea/vomiting.  He endorses anorexia associated with his 

symptoms.  CT of the abdomen/pelvis was significant for acute early 

appendicitis without perforation or abscess.  Patient was evaluated by general 

surgery and was taken for laparoscopic appendectomy earlier this evening.  

Patient states he has mild to moderate abdominal pain relieved with morphine.  

He is tolerating by mouth liquids.  He is very concerned about receiving his 

anxiety medications.





Review of Systems


Denies fever or chills


Denies blurry vision, otorrhea, rhinorrhea 


Denies sore throat and cough


No chest pain, palpitations, shortness of breath


Mild abdominal pain


Nausea/vomiting 2-3 days


Denies muscle pain/weakness


No rashes





Past Family Social History


Past Medical History


Chronic back pain


Anxiety/PTSD


Osteoarthritis


Depression


Aortic stenosis, patient follows with a cardiologist last seen 6 months ago


Past Surgical History


Cardiac catheterization 2 months ago; no stents placed


Neck fusion 3


L4-L5 discectomy


Right knee surgery 3


Hand surgery


Urethral repair


Reported Medications





Reported Meds & Active Scripts


Active


Duloxetine DR (Duloxetine HCl) 60 Mg Capdr 60 Mg PO BID 7 Days


Reported


Alprazolam 1 Mg Tab 1 Mg PO QID


Dulcolax Stool Softener (Docusate Sodium) 100 Mg Cap 100 Mg PO DAILY


Aspirin EC (Aspirin) 81 Mg Tabdr 81 Mg PO DAILY


Celebrex (Celecoxib) 200 Mg Cap 200 Mg PO DAILY


Percocet (Oxycodone-Acetaminophen)  mg Tab 1 Tab PO Q4HR PRN


Ms Contin (Morphine Sulfate) 30 Mg Tab 30 Mg PO BID


Allergies:  


Coded Allergies:  


     phenobarbital (Unverified  Allergy, Unknown, unknown, 17)


Family History


Dad with CHF, now .  Mom with breast cancer, CHF, CAD and CVA.


Social History


Smokes 2-5 cigarettes per day.  Has been smoking for 15 years.  Rare alcohol.  

Denies marijuana or illicit drugs





Physical Exam


Vital Signs





Vital Signs








  Date Time  Temp Pulse Resp B/P (MAP) Pulse Ox O2 Delivery O2 Flow Rate FiO2


 


17 22:00 96.1 52 16 142/67 (92) 96   


 


17 21:15 97.6 59 16 153/62 (92) 100 Room Air  


 


17 21:00 97.6 57 16 173/75 (107) 100 Nasal Cannula 3 


 


17 19:12 97.3 55 17 144/80 (101) 95   








Physical Exam


GENERAL: White male sitting up in bed


SKIN: No rashes, ecchymoses or lesions. Cool and dry.


HEAD: Atraumatic. Normocephalic. No temporal or scalp tenderness.


EYES: Pupils equal round and reactive. Extraocular motions intact. No scleral 

icterus. No injection or drainage. 


ENT: Nose without bleeding, purulent drainage or septal hematoma. Throat 

without erythema, tonsillar hypertrophy or exudate. Uvula midline. Airway 

patent.


NECK: Trachea midline. No JVD or lymphadenopathy. Supple, nontender, no 

meningeal signs.


CARDIOVASCULAR: Regular rate and rhythm.  Harsh, blowing systolic murmur.


RESPIRATORY: Clear to auscultation. Breath sounds equal bilaterally. No wheezes

, rales, or rhonchi.  


GASTROINTESTINAL: Abdomen soft, non-tender, nondistended.  Umbilical incision 

clean/dry/intact.


MUSCULOSKELETAL: Extremities without clubbing, cyanosis, or edema. No joint 

tenderness, effusion, or edema noted. No calf tenderness. Negative Homans sign 

bilaterally.


NEUROLOGICAL: Awake and alert. Cranial nerves II through XII intact.  Motor and 

sensory grossly within normal limits.  Normal speech.





Caprini VTE Risk Assessment


Caprini VTE Risk Assessment:  No/Low Risk (score <= 1)


Caprini Risk Assessment Model











 Point Value = 1          Point Value = 2  Point Value = 3  Point Value = 5


 


Age 41-60


Minor surgery


BMI > 25 kg/m2


Swollen legs


Varicose veins


Pregnancy or postpartum


History of unexplained or recurrent


   spontaneous 


Oral contraceptives or hormone


   replacement


Sepsis (< 1 month)


Serious lung disease, including


   pneumonia (< 1 month)


Abnormal pulmonary function


Acute myocardial infarction


Congestive heart failure (< 1 month)


History of inflammatory bowel disease


Medical patient at bed rest Age 61-74


Arthroscopic surgery


Major open surgery (> 45 min)


Laparoscopic surgery (> 45 min)


Malignancy


Confined to bed (> 72 hours)


Immobilizing plaster cast


Central venous access Age >= 75


History of VTE


Family history of VTE


Factor V Leiden


Prothrombin 67254B


Lupus anticoagulant


Anticardiolipin antibodies


Elevated serum homocysteine


Heparin-induced thrombocytopenia


Other congenital or acquired


   thrombophilia Stroke (< 1 month)


Elective arthroplasty


Hip, pelvis, or leg fracture


Acute spinal cord injury (< 1 month)








Prophylaxis Regimen











   Total Risk


Factor Score Risk Level Prophylaxis Regimen


 


0-1      Low Early ambulation


 


2 Moderate Order ONE of the following:


*Sequential Compression Device (SCD)


*Heparin 5000 units SQ BID


 


3-4 Higher Order ONE of the following medications:


*Heparin 5000 units SQ TID


*Enoxaparin/Lovenox 40 mg SQ daily (WT < 150 kg, CrCl > 30 mL/min)


*Enoxaparin/Lovenox 30 mg SQ daily (WT < 150 kg, CrCl > 10-29 mL/min)


*Enoxaparin/Lovenox 30 mg SQ BID (WT < 150 kg, CrCl > 30 mL/min)


AND/OR


*Sequential Compression Device (SCD)


 


5 or more Highest Order ONE of the following medications:


*Heparin 5000 units SQ TID (Preferred with Epidurals)


*Enoxaparin/Lovenox 40 mg SQ daily (WT < 150 kg, CrCl > 30 mL/min)


*Enoxaparin/Lovenox 30 mg SQ daily (WT < 150 kg, CrCl > 10-29 mL/min)


*Enoxaparin/Lovenox 30 mg SQ BID (WT < 150 kg, CrCl > 30 mL/min)


AND


*Sequential Compression Device (SCD)











Assessment and Plan


Assessment and Plan


56-year-old male with moderate aortic stenosis and anxiety/PTSD presents with 

early acute appendicitis





1.  Appendicitis


Status post laparoscopic appendectomy


Tolerating by mouth


Zosyn


General surgery following, appreciate recommendations





2.  Aortic stenosis


Moderate


Patient followed by cardiology





3.  Anxiety/PTSD


Continue home medications





FEN


NS at 100 cc/hour


Advance diet as tolerated per general surgery


Electrolytes within normal limits; follow


SCDs





Physician Certification


2 Midnight Certification Type:  Admission for Inpatient Services


Order for Inpatient Services


The services are ordered in accordance with Medicare regulations or non-

Medicare payer requirements, as applicable.  In the case of services not 

specified as inpatient-only, they are appropriately provided as inpatient 

services in accordance with the 2-midnight benchmark.


Estimated LOS (days):  2


2 days is the estimated time the patient will need to remain in the hospital, 

assuming treatment plan goals are met and no additional complications.


Post-Hospital Plan:  Home











Margaret Pedro MD 2017 23:01

## 2017-11-03 VITALS
RESPIRATION RATE: 17 BRPM | HEART RATE: 47 BPM | DIASTOLIC BLOOD PRESSURE: 97 MMHG | OXYGEN SATURATION: 95 % | SYSTOLIC BLOOD PRESSURE: 178 MMHG | TEMPERATURE: 96.7 F

## 2017-11-03 VITALS
SYSTOLIC BLOOD PRESSURE: 144 MMHG | HEART RATE: 52 BPM | OXYGEN SATURATION: 96 % | DIASTOLIC BLOOD PRESSURE: 85 MMHG | RESPIRATION RATE: 18 BRPM | TEMPERATURE: 98.4 F

## 2017-11-03 VITALS — OXYGEN SATURATION: 97 %

## 2017-11-03 VITALS
TEMPERATURE: 97.5 F | RESPIRATION RATE: 18 BRPM | DIASTOLIC BLOOD PRESSURE: 75 MMHG | OXYGEN SATURATION: 96 % | SYSTOLIC BLOOD PRESSURE: 144 MMHG | HEART RATE: 90 BPM

## 2017-11-03 LAB
BASOPHILS # BLD AUTO: 0 TH/MM3 (ref 0–0.2)
BASOPHILS NFR BLD: 0 % (ref 0–2)
BUN SERPL-MCNC: 14 MG/DL (ref 7–18)
CALCIUM SERPL-MCNC: 8.5 MG/DL (ref 8.5–10.1)
CHLORIDE SERPL-SCNC: 106 MEQ/L (ref 98–107)
CREAT SERPL-MCNC: 1.18 MG/DL (ref 0.6–1.3)
EOSINOPHIL # BLD: 0 TH/MM3 (ref 0–0.4)
EOSINOPHIL NFR BLD: 0 % (ref 0–4)
ERYTHROCYTE [DISTWIDTH] IN BLOOD BY AUTOMATED COUNT: 16 % (ref 11.6–17.2)
GFR SERPLBLD BASED ON 1.73 SQ M-ARVRAT: 63 ML/MIN (ref 89–?)
GLUCOSE SERPL-MCNC: 136 MG/DL (ref 74–106)
HCO3 BLD-SCNC: 24.3 MEQ/L (ref 21–32)
HCT VFR BLD CALC: 37.5 % (ref 39–51)
HGB BLD-MCNC: 12.4 GM/DL (ref 13–17)
LYMPHOCYTES # BLD AUTO: 0.5 TH/MM3 (ref 1–4.8)
LYMPHOCYTES NFR BLD AUTO: 6.9 % (ref 9–44)
MCH RBC QN AUTO: 31.3 PG (ref 27–34)
MCHC RBC AUTO-ENTMCNC: 33.1 % (ref 32–36)
MCV RBC AUTO: 94.3 FL (ref 80–100)
MONOCYTE #: 0.2 TH/MM3 (ref 0–0.9)
MONOCYTES NFR BLD: 2.3 % (ref 0–8)
NEUTROPHILS # BLD AUTO: 6.9 TH/MM3 (ref 1.8–7.7)
NEUTROPHILS NFR BLD AUTO: 90.8 % (ref 16–70)
PLATELET # BLD: 184 TH/MM3 (ref 150–450)
PMV BLD AUTO: 9.5 FL (ref 7–11)
RBC # BLD AUTO: 3.97 MIL/MM3 (ref 4.5–5.9)
SODIUM SERPL-SCNC: 138 MEQ/L (ref 136–145)
WBC # BLD AUTO: 7.6 TH/MM3 (ref 4–11)

## 2017-11-03 RX ADMIN — MORPHINE SULFATE PRN MG: 2 INJECTION, SOLUTION INTRAMUSCULAR; INTRAVENOUS at 09:38

## 2017-11-03 RX ADMIN — MORPHINE SULFATE PRN MG: 2 INJECTION, SOLUTION INTRAMUSCULAR; INTRAVENOUS at 01:17

## 2017-11-03 RX ADMIN — Medication SCH ML: at 08:24

## 2017-11-03 RX ADMIN — OXYCODONE HYDROCHLORIDE AND ACETAMINOPHEN PRN TAB: 10; 325 TABLET ORAL at 00:28

## 2017-11-03 RX ADMIN — OXYCODONE HYDROCHLORIDE AND ACETAMINOPHEN PRN TAB: 10; 325 TABLET ORAL at 08:25

## 2017-11-03 RX ADMIN — OXYCODONE HYDROCHLORIDE AND ACETAMINOPHEN PRN TAB: 10; 325 TABLET ORAL at 12:27

## 2017-11-03 RX ADMIN — TAZOBACTAM SODIUM AND PIPERACILLIN SODIUM SCH MLS/HR: 500; 4 INJECTION, SOLUTION INTRAVENOUS at 04:32

## 2017-11-03 RX ADMIN — PHENYTOIN SODIUM SCH MLS/HR: 50 INJECTION INTRAMUSCULAR; INTRAVENOUS at 12:27

## 2017-11-03 RX ADMIN — MORPHINE SULFATE PRN MG: 2 INJECTION, SOLUTION INTRAMUSCULAR; INTRAVENOUS at 05:25

## 2017-11-03 RX ADMIN — TAZOBACTAM SODIUM AND PIPERACILLIN SODIUM SCH MLS/HR: 500; 4 INJECTION, SOLUTION INTRAVENOUS at 12:00

## 2017-11-03 RX ADMIN — OXYCODONE HYDROCHLORIDE AND ACETAMINOPHEN PRN TAB: 10; 325 TABLET ORAL at 04:32

## 2017-11-03 NOTE — HHI.PR
Subjective


Remarks


Patient reports he is doing ok. Tolerated breakfast.





Objective


Vitals





Vital Signs








  Date Time  Temp Pulse Resp B/P (MAP) Pulse Ox O2 Delivery O2 Flow Rate FiO2


 


11/3/17 09:36     97   


 


11/3/17 08:00 96.7 47 17 178/97 (124) 95   


 


11/3/17 04:07 98.4 52 18 144/85 (104) 96   


 


11/2/17 23:48 96.9 70 17 156/76 (102) 97   


 


11/2/17 22:00 96.1 52 16 142/67 (92) 96   


 


11/2/17 21:15 97.6 59 16 153/62 (92) 100 Room Air  


 


11/2/17 21:00 97.6 57 16 173/75 (107) 100 Nasal Cannula 3 


 


11/2/17 19:12 97.3 55 17 144/80 (101) 95   














I/O      


 


 11/2/17 11/2/17 11/2/17 11/3/17 11/3/17 11/3/17





 07:00 15:00 23:00 07:00 15:00 23:00


 


Intake Total   1000 ml 1430 ml 999 ml 


 


Output Total   5 ml 500 ml  


 


Balance   995 ml 930 ml 999 ml 


 


      


 


Intake Oral   0 ml 380 ml  


 


IV Total   1000 ml 1050 ml 999 ml 


 


Output Urine Total   0 ml 500 ml  


 


Estimated Blood Loss   5 ml   


 


# Bowel Movements   0   








Result Diagram:  


11/3/17 0438                                                                   

             11/3/17 0438





Objective Remarks


GENERAL: No acute distress


SKIN: Warm and dry. POrt sites on the abdomen appears clean. 


CARDIOVASCULAR: Regular rate and rhythm.  


RESPIRATORY: No accessory muscle use. Clear to auscultation. Breath sounds 

equal bilaterally. 


GASTROINTESTINAL: Abdomen soft, nondistended, appropriately tender at the 

surgical sites. 


MUSCULOSKELETAL: Extremities without clubbing, cyanosis, or edema. No obvious 

deformities. 


NEUROLOGICAL: Awake and alert. Normal speech.


PSYCHIATRIC: Appropriate mood and affect; insight and judgment normal.





A/P


Problem List:  


(1) Appendicitis


ICD Code:  K37 - Unspecified appendicitis


(2) Chronic pain


ICD Code:  G89.29 - Other chronic pain


Status:  Acute


Assessment and Plan


56-year-old male with moderate aortic stenosis and anxiety/PTSD presents with 

early acute appendicitis. Patient was followed by general surgery. He underwent 

laparoscopic appendectomy. Patient did well post. He is discharged on in good 

condition. Continue all home medications for chronic conditions. 








Discharge home in good condition


Diet: Regular as tolerated


Activity: Reg as tolerated


Meds: Per med rec


Follow up: With PCP and surgery.











Karthik Thompson MD Nov 3, 2017 12:12

## 2017-11-03 NOTE — HHI.DCPOC
Discharge Care Plan


Diagnosis:  


(1) Appendicitis


(2) Chronic pain


(3) Anxiety


Goals to Promote Your Health


* To prevent worsening of your condition and complications


* To maintain your health at the optimal level


Directions to Meet Your Goals


*** Take your medications as prescribed


*** Follow your dietary instruction


*** Follow activity as directed








*** Keep your appointments as scheduled


*** Take your immunizations and boosters as scheduled


*** If your symptoms worsen call your PCP, if no PCP go to Urgent Care Center 

or Emergency Room***


*** Smoking is Dangerous to Your Health. Avoid second hand smoke***


***Call the 24-hour hour crisis hotline for domestic abuse at 1-515.380.8483***











Karthik Thompson MD Nov 3, 2017 12:11

## 2017-11-03 NOTE — MP
cc:

JAYASHREE ALVARADO MD

****

 

 

DATE OF SURGERY

11/2/17

 

PROCEDURE

Laparoscopic appendectomy.

 

PREOPERATIVE DIAGNOSIS

Acute appendicitis

 

POSTOPERATIVE DIAGNOSIS

Acute appendicitis  without perforation

 

ANESTHESIA

General endotracheal

 

SURGEON

NAINA Alvarado MD

 

ESTIMATED BLOOD LOSS

5 mL

 

FLUIDS

900 mL crystalloid

 

COMPLICATIONS

None.

 

DRAINS

None.

 

SPECIMEN

Appendix to pathology.

 

PROCEDURE IN DETAIL

The patient was taken to the operating room and placed on the operating table

in the supine position.  After an adequate level of general endotracheal

anesthesia was achieved, the abdomen was prepped and draped in usual fashion.

Time-out was taken confirming the correct patient, site and procedure to be

performed.

 

Skin and subcutaneous tissue was infiltrated with local anesthetic and incision

made in the umbilicus and carried through the fascia sharply.  The peritoneal

cavity was directly visualized.  A 12 mm balloon trocar was inserted and the

balloon inflated.  The abdomen was insufflated.  The patient was placed in

Trendelenburg position.  Two 5 mm trocars were then placed with the first in

the right lower quadrant and the second in the  infraumbilical suprapubic

region.  Both entered the abdominal cavity under direct vision uneventfully.

The appendix was then manipulated and the tip was seen to be mildly inflamed.

The mesoappendix was divided with the harmonic scalpel and a bloodless plane

and dissection was carried back to the base of the appendix.  A 0-PDS Endoloop

was cinched down at the base of the appendix.  One cm distal to this, the

appendix was divided.  The appendix was  placed into an EndoCatch device and

removed via the umbilical port while observing via the right lower quadrant

trocar site.  The specimen was passed off the table.  The abdomen was

reexamined and irrigated.  The appendiceal stump and mesoappendix were

reexamined and found be clean and dry.  All irrigation was aspirated from the

abdominal cavity.  Insufflation was discontinued.  The 5-mm trocars were

removed under direct vision.  No bleeding was noted from the trocar sites

during desufflation of the abdomen.  The laparoscope and umbilical port were

removed.  The fascia was closed in the umbilicus with 0 Vicryl suture in both a

simple interrupted and figure-of-eight fashion.  The remaining local anesthetic

was injected into each of the trocar sites and the skin was closed at each of

the three trocar sites with 4-0 Vicryl in an interrupted buried fashion.  The

trocar sites were dressed with Steri-Strips.  The patient was extubated and

taken back to the recovery room in stable condition.  He tolerated the

procedure well.

 

                              _________________________________

                              MD CHARITY Kaplan/

D:  11/2/2017/9:15 PM

T:  11/3/2017/7:59 PM

Visit #:  [id number]

Job #:  [id number]

## 2017-11-03 NOTE — MP
cc:

JAYASHREE ALVARADO MD

****

 

 

DATE OF SURGERY

11/2/17

 

PROCEDURE

Laparoscopic appendectomy.

 

PREOPERATIVE DIAGNOSIS

Acute appendicitis

 

POSTOPERATIVE DIAGNOSIS

Acute appendicitis  without perforation

 

ANESTHESIA

General endotracheal

 

SURGEON

NAINA Alvarado MD

 

ESTIMATED BLOOD LOSS

5 mL

 

FLUIDS

900 mL crystalloid

 

COMPLICATIONS

None.

 

DRAINS

None.

 

SPECIMEN

Appendix to pathology.

 

PROCEDURE IN DETAIL

The patient was taken to the operating room and placed on the operating table

in the supine position.  After an adequate level of general endotracheal

anesthesia was achieved, the abdomen was prepped and draped in usual fashion.

Time-out was taken confirming the correct patient, site and procedure to be

performed.

 

Skin and subcutaneous tissue was infiltrated with local anesthetic and incision

made in the umbilicus and carried through the fascia sharply.  The peritoneal

cavity was directly visualized.  A 12 mm balloon trocar was inserted and the

balloon inflated.  The abdomen was insufflated.  The patient was placed in

Trendelenburg position.  Two 5 mm trocars were then placed with the first in

the right lower quadrant and the second in the  infraumbilical suprapubic

region.  Both entered the abdominal cavity under direct vision uneventfully.

The appendix was then manipulated and the tip was seen to be mildly inflamed.

The mesoappendix was divided with the harmonic scalpel and a bloodless plane

and dissection was carried back to the base of the appendix.  A 0-PDS Endoloop

was cinched down at the base of the appendix.  One cm distal to this, the

appendix was divided.  The appendix was  placed into an EndoCatch device and

removed via the umbilical port while observing via the right lower quadrant

trocar site.  The specimen was passed off the table.  The abdomen was

reexamined and irrigated.  The appendiceal stump and mesoappendix were

reexamined and found be clean and dry.  All irrigation was aspirated from the

abdominal cavity.  Insufflation was discontinued.  The 5-mm trocars were

removed under direct vision.  No bleeding was noted from the trocar sites

during desufflation of the abdomen.  The laparoscope and umbilical port were

removed.  The fascia was closed in the umbilicus with 0 Vicryl suture in both a

simple interrupted and figure-of-eight fashion.  The remaining local anesthetic

was injected into each of the trocar sites and the skin was closed at each of

the three trocar sites with 4-0 Vicryl in an interrupted buried fashion.  The

trocar sites were dressed with Steri-Strips.  The patient was extubated and

taken back to the recovery room in stable condition.  He tolerated the

procedure well.

 

                              _________________________________

                              MD CHARITY Kaplan/

D:  11/2/2017/9:15 PM

T:  11/3/2017/7:59 PM

Visit #:  [id number]

Job #:  [id number]

## 2017-11-03 NOTE — HHI.PR
__________________________________________________





Subjective


Subjective Notes


Resting in bed


Feels hungry





Objective


Vitals/I&O





Vital Signs








  Date Time  Temp Pulse Resp B/P (MAP) Pulse Ox O2 Delivery O2 Flow Rate FiO2


 


11/3/17 12:00 97.5 90 18 144/75 (98) 96   


 


11/2/17 21:15      Room Air  


 


11/2/17 21:00       3 








Labs





Laboratory Tests








Test


  11/3/17


04:38


 


White Blood Count 7.6 


 


Red Blood Count 3.97 


 


Hemoglobin 12.4 


 


Hematocrit 37.5 


 


Mean Corpuscular Volume 94.3 


 


Mean Corpuscular Hemoglobin 31.3 


 


Mean Corpuscular Hemoglobin


Concent 33.1 


 


 


Red Cell Distribution Width 16.0 


 


Platelet Count 184 


 


Mean Platelet Volume 9.5 


 


Neutrophils (%) (Auto) 90.8 


 


Lymphocytes (%) (Auto) 6.9 


 


Monocytes (%) (Auto) 2.3 


 


Eosinophils (%) (Auto) 0.0 


 


Basophils (%) (Auto) 0.0 


 


Neutrophils # (Auto) 6.9 


 


Lymphocytes # (Auto) 0.5 


 


Monocytes # (Auto) 0.2 


 


Eosinophils # (Auto) 0.0 


 


Basophils # (Auto) 0.0 


 


CBC Comment DIFF FINAL 


 


Differential Comment  


 


Blood Urea Nitrogen 14 


 


Creatinine 1.18 


 


Random Glucose 136 


 


Calcium Level 8.5 


 


Sodium Level 138 


 


Potassium Level 4.3 


 


Chloride Level 106 


 


Carbon Dioxide Level 24.3 


 


Anion Gap 8 


 


Estimat Glomerular Filtration


Rate 63 


 








Radiology


CT abdomen and pelvis from [place] reviewed and shows early acute appendicitis.


Cardiovascular:  Regular


Lungs:  Clear


Abdomen:  Non-distended, Other (lap sites c/d/i ), Post-op tenderness


Extremities:  No edema





A/P


Assessment and Plan


56 year old male POD1 lap appy; non perforated; non complicated





-Advance to regular diet 


-DC IVF


-No antibiotics needed


-GS clear for DC


-Follow up 1 week with Vandana Kent Nov 3, 2017 13:45

## 2017-11-03 NOTE — HHI.PR
__________________________________________________





Subjective


Subjective Notes


Resting in bed


Feels hungry





Objective


Vitals/I&O





Vital Signs








  Date Time  Temp Pulse Resp B/P (MAP) Pulse Ox O2 Delivery O2 Flow Rate FiO2


 


11/3/17 12:00 97.5 90 18 144/75 (98) 96   


 


11/2/17 21:15      Room Air  


 


11/2/17 21:00       3 








Labs





Laboratory Tests








Test


  11/3/17


04:38


 


White Blood Count 7.6 


 


Red Blood Count 3.97 


 


Hemoglobin 12.4 


 


Hematocrit 37.5 


 


Mean Corpuscular Volume 94.3 


 


Mean Corpuscular Hemoglobin 31.3 


 


Mean Corpuscular Hemoglobin


Concent 33.1 


 


 


Red Cell Distribution Width 16.0 


 


Platelet Count 184 


 


Mean Platelet Volume 9.5 


 


Neutrophils (%) (Auto) 90.8 


 


Lymphocytes (%) (Auto) 6.9 


 


Monocytes (%) (Auto) 2.3 


 


Eosinophils (%) (Auto) 0.0 


 


Basophils (%) (Auto) 0.0 


 


Neutrophils # (Auto) 6.9 


 


Lymphocytes # (Auto) 0.5 


 


Monocytes # (Auto) 0.2 


 


Eosinophils # (Auto) 0.0 


 


Basophils # (Auto) 0.0 


 


CBC Comment DIFF FINAL 


 


Differential Comment  


 


Blood Urea Nitrogen 14 


 


Creatinine 1.18 


 


Random Glucose 136 


 


Calcium Level 8.5 


 


Sodium Level 138 


 


Potassium Level 4.3 


 


Chloride Level 106 


 


Carbon Dioxide Level 24.3 


 


Anion Gap 8 


 


Estimat Glomerular Filtration


Rate 63 


 








Radiology


CT abdomen and pelvis from [place] reviewed and shows early acute appendicitis.


Cardiovascular:  Regular


Lungs:  Clear


Abdomen:  Non-distended, Other (lap sites c/d/i ), Post-op tenderness


Extremities:  No edema





A/P


Assessment and Plan


56 year old male POD1 lap appy; non perforated; non complicated





-Advance to regular diet 


-DC IVF


-No antibiotics needed


-GS clear for DC


-Follow up 1 week with Vandana Kent Nov 3, 2017 13:45

## 2017-11-03 NOTE — HHI.DCPOC
Discharge Care Plan


Diagnosis:  


(1) Appendicitis


(2) Chronic pain


(3) Anxiety


Goals to Promote Your Health


* To prevent worsening of your condition and complications


* To maintain your health at the optimal level


Directions to Meet Your Goals


*** Take your medications as prescribed


*** Follow your dietary instruction


*** Follow activity as directed








*** Keep your appointments as scheduled


*** Take your immunizations and boosters as scheduled


*** If your symptoms worsen call your PCP, if no PCP go to Urgent Care Center 

or Emergency Room***


*** Smoking is Dangerous to Your Health. Avoid second hand smoke***


***Call the 24-hour hour crisis hotline for domestic abuse at 1-426.175.7645***











Karthik Thompson MD Nov 3, 2017 12:11

## 2017-11-03 NOTE — MP
cc:

JAYASHREE ALVARADO MD

****

 

 

DATE OF SURGERY

11/2/17

 

PROCEDURE

Laparoscopic appendectomy.

 

PREOPERATIVE DIAGNOSIS

Acute appendicitis

 

POSTOPERATIVE DIAGNOSIS

Acute appendicitis  without perforation

 

ANESTHESIA

General endotracheal

 

SURGEON

NAINA Alvarado MD

 

ESTIMATED BLOOD LOSS

5 mL

 

FLUIDS

900 mL crystalloid

 

COMPLICATIONS

None.

 

DRAINS

None.

 

SPECIMEN

Appendix to pathology.

 

PROCEDURE IN DETAIL

The patient was taken to the operating room and placed on the operating table

in the supine position.  After an adequate level of general endotracheal

anesthesia was achieved, the abdomen was prepped and draped in usual fashion.

Time-out was taken confirming the correct patient, site and procedure to be

performed.

 

Skin and subcutaneous tissue was infiltrated with local anesthetic and incision

made in the umbilicus and carried through the fascia sharply.  The peritoneal

cavity was directly visualized.  A 12 mm balloon trocar was inserted and the

balloon inflated.  The abdomen was insufflated.  The patient was placed in

Trendelenburg position.  Two 5 mm trocars were then placed with the first in

the right lower quadrant and the second in the  infraumbilical suprapubic

region.  Both entered the abdominal cavity under direct vision uneventfully.

The appendix was then manipulated and the tip was seen to be mildly inflamed.

The mesoappendix was divided with the harmonic scalpel and a bloodless plane

and dissection was carried back to the base of the appendix.  A 0-PDS Endoloop

was cinched down at the base of the appendix.  One cm distal to this, the

appendix was divided.  The appendix was  placed into an EndoCatch device and

removed via the umbilical port while observing via the right lower quadrant

trocar site.  The specimen was passed off the table.  The abdomen was

reexamined and irrigated.  The appendiceal stump and mesoappendix were

reexamined and found be clean and dry.  All irrigation was aspirated from the

abdominal cavity.  Insufflation was discontinued.  The 5-mm trocars were

removed under direct vision.  No bleeding was noted from the trocar sites

during desufflation of the abdomen.  The laparoscope and umbilical port were

removed.  The fascia was closed in the umbilicus with 0 Vicryl suture in both a

simple interrupted and figure-of-eight fashion.  The remaining local anesthetic

was injected into each of the trocar sites and the skin was closed at each of

the three trocar sites with 4-0 Vicryl in an interrupted buried fashion.  The

trocar sites were dressed with Steri-Strips.  The patient was extubated and

taken back to the recovery room in stable condition.  He tolerated the

procedure well.

 

                              _________________________________

                              MD CHARITY Kaplan/

D:  11/2/2017/9:15 PM

T:  11/3/2017/7:59 PM

Visit #:  [id number]

Job #:  [id number]

## 2017-11-03 NOTE — HHI.DCPOC
Discharge Care Plan


Diagnosis:  


(1) Appendicitis


(2) Chronic pain


(3) Anxiety


Goals to Promote Your Health


* To prevent worsening of your condition and complications


* To maintain your health at the optimal level


Directions to Meet Your Goals


*** Take your medications as prescribed


*** Follow your dietary instruction


*** Follow activity as directed








*** Keep your appointments as scheduled


*** Take your immunizations and boosters as scheduled


*** If your symptoms worsen call your PCP, if no PCP go to Urgent Care Center 

or Emergency Room***


*** Smoking is Dangerous to Your Health. Avoid second hand smoke***


***Call the 24-hour hour crisis hotline for domestic abuse at 1-435.776.3740***











Karthik Thompson MD Nov 3, 2017 12:11

## 2017-11-03 NOTE — HHI.PR
__________________________________________________





Subjective


Subjective Notes


Resting in bed


Feels hungry





Objective


Vitals/I&O





Vital Signs








  Date Time  Temp Pulse Resp B/P (MAP) Pulse Ox O2 Delivery O2 Flow Rate FiO2


 


11/3/17 12:00 97.5 90 18 144/75 (98) 96   


 


11/2/17 21:15      Room Air  


 


11/2/17 21:00       3 








Labs





Laboratory Tests








Test


  11/3/17


04:38


 


White Blood Count 7.6 


 


Red Blood Count 3.97 


 


Hemoglobin 12.4 


 


Hematocrit 37.5 


 


Mean Corpuscular Volume 94.3 


 


Mean Corpuscular Hemoglobin 31.3 


 


Mean Corpuscular Hemoglobin


Concent 33.1 


 


 


Red Cell Distribution Width 16.0 


 


Platelet Count 184 


 


Mean Platelet Volume 9.5 


 


Neutrophils (%) (Auto) 90.8 


 


Lymphocytes (%) (Auto) 6.9 


 


Monocytes (%) (Auto) 2.3 


 


Eosinophils (%) (Auto) 0.0 


 


Basophils (%) (Auto) 0.0 


 


Neutrophils # (Auto) 6.9 


 


Lymphocytes # (Auto) 0.5 


 


Monocytes # (Auto) 0.2 


 


Eosinophils # (Auto) 0.0 


 


Basophils # (Auto) 0.0 


 


CBC Comment DIFF FINAL 


 


Differential Comment  


 


Blood Urea Nitrogen 14 


 


Creatinine 1.18 


 


Random Glucose 136 


 


Calcium Level 8.5 


 


Sodium Level 138 


 


Potassium Level 4.3 


 


Chloride Level 106 


 


Carbon Dioxide Level 24.3 


 


Anion Gap 8 


 


Estimat Glomerular Filtration


Rate 63 


 








Radiology


CT abdomen and pelvis from [place] reviewed and shows early acute appendicitis.


Cardiovascular:  Regular


Lungs:  Clear


Abdomen:  Non-distended, Other (lap sites c/d/i ), Post-op tenderness


Extremities:  No edema





A/P


Assessment and Plan


56 year old male POD1 lap appy; non perforated; non complicated





-Advance to regular diet 


-DC IVF


-No antibiotics needed


-GS clear for DC


-Follow up 1 week with Vandana Kent Nov 3, 2017 13:45

## 2017-11-12 ENCOUNTER — HOSPITAL ENCOUNTER (OUTPATIENT)
Dept: HOSPITAL 17 - NEDDLT | Age: 57
Setting detail: OBSERVATION
LOS: 1 days | Discharge: HOME | End: 2017-11-13
Attending: INTERNAL MEDICINE | Admitting: INTERNAL MEDICINE
Payer: MEDICARE

## 2017-11-12 VITALS
HEART RATE: 64 BPM | TEMPERATURE: 97.9 F | RESPIRATION RATE: 16 BRPM | OXYGEN SATURATION: 98 % | SYSTOLIC BLOOD PRESSURE: 138 MMHG | DIASTOLIC BLOOD PRESSURE: 82 MMHG

## 2017-11-12 VITALS — WEIGHT: 191.8 LBS | HEIGHT: 70 IN | BODY MASS INDEX: 27.46 KG/M2

## 2017-11-12 VITALS
HEART RATE: 69 BPM | RESPIRATION RATE: 16 BRPM | SYSTOLIC BLOOD PRESSURE: 112 MMHG | TEMPERATURE: 98.1 F | DIASTOLIC BLOOD PRESSURE: 63 MMHG | OXYGEN SATURATION: 98 %

## 2017-11-12 VITALS
DIASTOLIC BLOOD PRESSURE: 67 MMHG | OXYGEN SATURATION: 95 % | SYSTOLIC BLOOD PRESSURE: 114 MMHG | TEMPERATURE: 98.1 F | RESPIRATION RATE: 17 BRPM

## 2017-11-12 VITALS — OXYGEN SATURATION: 97 %

## 2017-11-12 VITALS — HEART RATE: 63 BPM

## 2017-11-12 DIAGNOSIS — Z79.899: ICD-10-CM

## 2017-11-12 DIAGNOSIS — I10: ICD-10-CM

## 2017-11-12 DIAGNOSIS — R07.89: Primary | ICD-10-CM

## 2017-11-12 DIAGNOSIS — R61: ICD-10-CM

## 2017-11-12 DIAGNOSIS — G89.29: ICD-10-CM

## 2017-11-12 DIAGNOSIS — F17.210: ICD-10-CM

## 2017-11-12 DIAGNOSIS — R11.0: ICD-10-CM

## 2017-11-12 DIAGNOSIS — R06.02: ICD-10-CM

## 2017-11-12 DIAGNOSIS — I35.0: ICD-10-CM

## 2017-11-12 DIAGNOSIS — Z82.49: ICD-10-CM

## 2017-11-12 LAB — CK SERPL-CCNC: 65 U/L (ref 39–308)

## 2017-11-12 PROCEDURE — 93005 ELECTROCARDIOGRAM TRACING: CPT

## 2017-11-12 PROCEDURE — 85610 PROTHROMBIN TIME: CPT

## 2017-11-12 PROCEDURE — G0378 HOSPITAL OBSERVATION PER HR: HCPCS

## 2017-11-12 PROCEDURE — 71010: CPT

## 2017-11-12 PROCEDURE — 84484 ASSAY OF TROPONIN QUANT: CPT

## 2017-11-12 PROCEDURE — 99281 EMR DPT VST MAYX REQ PHY/QHP: CPT

## 2017-11-12 PROCEDURE — 71275 CT ANGIOGRAPHY CHEST: CPT

## 2017-11-12 PROCEDURE — 85730 THROMBOPLASTIN TIME PARTIAL: CPT

## 2017-11-12 PROCEDURE — 93017 CV STRESS TEST TRACING ONLY: CPT

## 2017-11-12 PROCEDURE — 78452 HT MUSCLE IMAGE SPECT MULT: CPT

## 2017-11-12 PROCEDURE — 97162 PT EVAL MOD COMPLEX 30 MIN: CPT

## 2017-11-12 PROCEDURE — A9502 TC99M TETROFOSMIN: HCPCS

## 2017-11-12 PROCEDURE — 82550 ASSAY OF CK (CPK): CPT

## 2017-11-12 PROCEDURE — 76937 US GUIDE VASCULAR ACCESS: CPT

## 2017-11-12 PROCEDURE — 85025 COMPLETE CBC W/AUTO DIFF WBC: CPT

## 2017-11-12 PROCEDURE — 80053 COMPREHEN METABOLIC PANEL: CPT

## 2017-11-12 RX ADMIN — STANDARDIZED SENNA CONCENTRATE AND DOCUSATE SODIUM SCH TAB: 8.6; 5 TABLET, FILM COATED ORAL at 21:44

## 2017-11-12 RX ADMIN — MORPHINE SULFATE SCH MG: 30 TABLET, EXTENDED RELEASE ORAL at 17:58

## 2017-11-12 RX ADMIN — OXYCODONE HYDROCHLORIDE AND ACETAMINOPHEN PRN TAB: 10; 325 TABLET ORAL at 21:49

## 2017-11-12 RX ADMIN — PANTOPRAZOLE SCH MG: 40 TABLET, DELAYED RELEASE ORAL at 17:58

## 2017-11-12 RX ADMIN — Medication SCH ML: at 21:00

## 2017-11-12 RX ADMIN — DULOXETINE SCH MG: 60 CAPSULE, DELAYED RELEASE ORAL at 21:44

## 2017-11-12 NOTE — HHI.HP
HPI


Primary Care Physician


No Primary Care Physician


Chief Complaint


Chest pain


History of Present Illness


There is a 56-year-old male that presents to ED in [place] to evaluate a chest 

discomfort and was subsequently transported to chest pain center via [facility] to 

further evaluate his discomfort.  She does have recent history of having a 

laparoscopic appendectomy one week ago at this facility.  States he really has 

not been that active until today which he went for a walk.  States that he had 

taken a Viagra earlier in the morning, had sex without any issues.  Denied 

having discomforts or shortness of breath during sex.  About 2 hours later he 

decided to go for walk.  Describes developing a discomfort as "an elephant 

sitting on my chest" rated as a 9 out of 10.  He was short of breath, nauseous, 

and diaphoretic with the symptoms.  States the discomfort still present as an 8 

out of 10.  He was given medication in [place] which helped a little bit but 

did not get nitroglycerin secondary to recently taking Viagra.  He denies 

history of coronary artery disease.  States he had a heart catheterization 

about 7 years ago and really cannot recall the results but states he had no 

stents placed.  He had a Lexiscan at this facility  that was 

nonischemic and on the same visit he had a 2-D echo revealing mild to moderate 

aortic stenosis.  Has not followed with cardiology since.  Denies recent 

illness.  Denies fevers or chills.





Review of Systems


General: Patient denies fevers, chills recent, and recent travel


HEENT: Patient denies headache, sore throat, difficulty swallowing.  


Cardiovascular: Has the chest discomfort as mentioned above.  Denies sensation 

of heart beating rapidly or irregularly.  No syncope.  He was diaphoretic.


Respiratory: He was short of breath.  Denies inspirational chest discomfort.  

Denies coughing wheezing or hemoptysis.  


GI: He was nauseous.  A little bit of abdominal discomfort but states it has 

improved significantly postoperatively.  Patient denies, vomiting, diarrhea, 

bloody stools.


Musculoskeletal: He has chronic neck, back, and bilateral knee pains.  Patient 

denies joint pain or edema.  Denies calf pain or edema.


Neurovascular: Patient denies numbness, tingling, weakness in extremities.  

Denies headache.


Endocrine: Denies polyuria and polydipsia.


Hematologic: Denies easy bruising.


Skin: Denies rash or itching.





Past Family Social History


Allergies:  


Coded Allergies:  


     phenobarbital (Unverified  Allergy, Unknown, unknown, 17)


Past Medical History


Hypertension, chronic pain, tobacco abuse, recent laparoscopic appendectomy.  

Eyes hyperlipidemia, diabetes, and known CAD.


Past Surgical History


Laparoscopic appendectomy last week.  Ex surgeries, back surgeries.  Knee 

surgeries.  Stated cardiac catheterization without intervention.


Reported Medications





Reported Meds & Active Scripts


Active


Reported


Viagra (Sildenafil Citrate) 100 Mg Tab 100 Mg PO DAILY PRN


Hydrochlorothiazide 12.5 Mg Cap 12.5 Mg PO DAILY


Lisinopril 10 Mg Tab 10 Mg PO DAILY


Cymbalta DR (Duloxetine HCl) 60 Mg Capdr 60 Mg PO BID


Aspirin EC (Aspirin) 81 Mg Tabdr 81 Mg PO DAILY


Celebrex (Celecoxib) 200 Mg Cap 200 Mg PO DAILY


Percocet (Oxycodone-Acetaminophen)  mg Tab 1 Tab PO Q4HR PRN


Active Ordered Medications





Current Medications








 Medications


  (Trade)  Dose


 Ordered  Sig/Lashawn


 Route  Start Time


 Stop Time Status Last Admin


 


  (Catapres)  0.1 mg  Q4H  PRN


 PO  17 14:00


     


 


 


  (Ecotrin Ec)  81 mg  DAILY


 PO  17 09:00


     


 


 


  (CeleBREX)  200 mg  DAILY


 PO  17 09:00


     


 


 


  (Cymbalta Dr)  60 mg  BID


 PO  17 21:00


     


 


 


  (Microzide)  12.5 mg  DAILY


 PO  17 09:00


     


 


 


  (Prinivil)  10 mg  DAILY


 PO  17 09:00


     


 


 


  (Percocet 


 Mg)  1 tab  Q4HR  PRN


 PO  17 14:00


     


 


 


  (NS Flush)  2 ml  BID


 IV FLUSH  17 21:00


     


 


 


  (NS Flush)  2 ml  UNSCH  PRN


 IV FLUSH  17 14:00


     


 


 


  (Tylenol)  500 mg  Q4H  PRN


 PO  17 14:00


     


 


 


  (Protonix)  40 mg  DAILY


 PO  17 17:00


     


 


 


  (Xanax)  0.25 mg  TID  PRN


 PO  17 14:00


     


 


 


  (Tylenol)  650 mg  Q4H  PRN


 PO  17 14:00


     


 


 


  (Zofran Inj)  4 mg  Q6H  PRN


 IVP  17 14:00


     


 


 


  (Compazine Supp)  25 mg  Q12H  PRN


 RECTAL  17 14:00


     


 


 


  (Ambien)  5 mg  HS  PRN


 PO  17 21:00


     


 


 


  (Tylenol)  650 mg  Q6H  PRN


 PO  17 14:00


     


 


 


  (Percocet  5-325


 Mg)  1 tab  Q6H  PRN


 PO  17 14:00


     


 


 


  (Morphine Inj)  2 mg  Q3H  PRN


 IV PUSH  17 14:00


     


 


 


  (Morphine Inj)  4 mg  Q3H  PRN


 IV PUSH  17 14:00


     


 


 


  (Narcan Inj)  0.4 mg  UNSCH  PRN


 IV PUSH  17 14:00


     


 


 


  (Summer-Colace)  1 tab  BID


 PO  17 21:00


     


 


 


  (Milk Of


 Magnesia Liq)  30 ml  Q12H  PRN


 PO  17 14:00


     


 


 


  (Senokot)  17.2 mg  Q12H  PRN


 PO  17 14:00


     


 


 


  (Dulcolax Supp)  10 mg  DAILY  PRN


 RECTAL  17 14:00


     


 


 


  (Lactulose Liq)  30 ml  DAILY  PRN


 PO  17 14:00


     


 








Family History


Both parents had CAD.  Father's onset was in his 40s and his mother's onset CAD 

was in her 50s.


Social History


Patient has smoked one quarter pack of cigarettes daily for approximately 20 

years.  Has occasional alcohol.  Denies illicit drugs.





Physical Exam


Physical Exam


GENERAL: This is a well-nourished, well-developed patient, in no apparent 

distress.  Patient speaks in clear complete sentences.  Patient is pleasant.


HEENT: Head is atraumatic and normocephalic.  Neck is supple without 

lymphadenopathy and trachea is midline.  No JVD or carotid bruits.


CARDIOVASCULAR: Grade 3 systolic murmur best heard right sternal border 

radiating into the neck.  This is also audible along the left sternal border.  

Regular rate and rhythm without murmurs, gallops, or rubs. 


RESPIRATORY: Clear to auscultation. Breath sounds equal bilaterally. No wheezes

, rales, or rhonchi.  Chest wall is nontender.  No use of accessory muscles.


GASTROINTESTINAL: Abdomen is nontender, nondistended.  Abdomen soft.  No 

obvious pulsatile mass or bruit.  No CVA tenderness.  Strong femoral pulses 

bilaterally.  Normal bowel sounds in all quadrants.


MUSCULOSKELETAL: Patient is moving upper and lower extremities freely.  No calf 

tenderness or edema, no Homans sign.  Strong pulses in upper and lower 

extremities.


NEUROLOGICAL: Patient is alert and oriented.  Cranial nerves 2-12 are grossly 

intact.  No focal deficits and speech is clear.


SKIN: No rash and turgor is normal.


Imaging


Chest x-ray read by radiologist as no acute disease.


Course


Initial EKG sinus tachycardia rate 110 without significant ST segment 

depressions or elevations.  Nonspecific T-wave changes.





Caprini VTE Risk Assessment


Caprini VTE Risk Assessment:  Mod/High Risk (score >= 2)


Caprini Risk Assessment Model











 Point Value = 1          Point Value = 2  Point Value = 3  Point Value = 5


 


Age 41-60


Minor surgery


BMI > 25 kg/m2


Swollen legs


Varicose veins


Pregnancy or postpartum


History of unexplained or recurrent


   spontaneous 


Oral contraceptives or hormone


   replacement


Sepsis (< 1 month)


Serious lung disease, including


   pneumonia (< 1 month)


Abnormal pulmonary function


Acute myocardial infarction


Congestive heart failure (< 1 month)


History of inflammatory bowel disease


Medical patient at bed rest Age 61-74


Arthroscopic surgery


Major open surgery (> 45 min)


Laparoscopic surgery (> 45 min)


Malignancy


Confined to bed (> 72 hours)


Immobilizing plaster cast


Central venous access Age >= 75


History of VTE


Family history of VTE


Factor V Leiden


Prothrombin 82551N


Lupus anticoagulant


Anticardiolipin antibodies


Elevated serum homocysteine


Heparin-induced thrombocytopenia


Other congenital or acquired


   thrombophilia Stroke (< 1 month)


Elective arthroplasty


Hip, pelvis, or leg fracture


Acute spinal cord injury (< 1 month)








Prophylaxis Regimen











   Total Risk


Factor Score Risk Level Prophylaxis Regimen


 


0-1      Low Early ambulation


 


2 Moderate Order ONE of the following:


*Sequential Compression Device (SCD)


*Heparin 5000 units SQ BID


 


3-4 Higher Order ONE of the following medications:


*Heparin 5000 units SQ TID


*Enoxaparin/Lovenox 40 mg SQ daily (WT < 150 kg, CrCl > 30 mL/min)


*Enoxaparin/Lovenox 30 mg SQ daily (WT < 150 kg, CrCl > 10-29 mL/min)


*Enoxaparin/Lovenox 30 mg SQ BID (WT < 150 kg, CrCl > 30 mL/min)


AND/OR


*Sequential Compression Device (SCD)


 


5 or more Highest Order ONE of the following medications:


*Heparin 5000 units SQ TID (Preferred with Epidurals)


*Enoxaparin/Lovenox 40 mg SQ daily (WT < 150 kg, CrCl > 30 mL/min)


*Enoxaparin/Lovenox 30 mg SQ daily (WT < 150 kg, CrCl > 10-29 mL/min)


*Enoxaparin/Lovenox 30 mg SQ BID (WT < 150 kg, CrCl > 30 mL/min)


AND


*Sequential Compression Device (SCD)











Assessment and Plan


Assessment and Plan


* Chest pain: Patient will continue to have serial cardiac enzymes and EKGs for 

ruling out purposes.  He will be seen by Dr. Goodman of cardiology and the chest 

and [place].  I have discussed this patient with Dr. Goodman.  Reviewed 2-D echo 

on this patient from 2017 with Dr. Goodman and likely the aortic 

stenosis has not progressed within the last 6 months.  Subsequently patient 

will likely have a Lexiscan in the morning if he does rule out.  He will need 

to follow with cardiologist after being discharged.  The discomfort in his 

chest is still present.  He basically stating he has had a sedentary lifestyle 

since his surgery a week ago.  We will get a CTA to rule out PE.  If CTA is 

negative for PE that he likely will have a Lexiscan in the morning.


* Hypertension: Continue current medication.


* Chronic pain: Continue his medications.


* Tobacco abuse: Patient has been counseled on importance of smoking cessation.


Patient is stable this time.  He is agreeable to this plan.











Marco A Cisneros 2017 16:41

## 2017-11-13 VITALS
DIASTOLIC BLOOD PRESSURE: 57 MMHG | SYSTOLIC BLOOD PRESSURE: 98 MMHG | TEMPERATURE: 98.1 F | RESPIRATION RATE: 17 BRPM | OXYGEN SATURATION: 96 % | HEART RATE: 55 BPM

## 2017-11-13 VITALS
HEART RATE: 62 BPM | OXYGEN SATURATION: 95 % | SYSTOLIC BLOOD PRESSURE: 109 MMHG | RESPIRATION RATE: 18 BRPM | DIASTOLIC BLOOD PRESSURE: 59 MMHG | TEMPERATURE: 98 F

## 2017-11-13 VITALS — HEART RATE: 75 BPM

## 2017-11-13 RX ADMIN — MORPHINE SULFATE SCH MG: 30 TABLET, EXTENDED RELEASE ORAL at 01:27

## 2017-11-13 RX ADMIN — PANTOPRAZOLE SCH MG: 40 TABLET, DELAYED RELEASE ORAL at 08:31

## 2017-11-13 RX ADMIN — STANDARDIZED SENNA CONCENTRATE AND DOCUSATE SODIUM SCH TAB: 8.6; 5 TABLET, FILM COATED ORAL at 08:31

## 2017-11-13 RX ADMIN — OXYCODONE HYDROCHLORIDE AND ACETAMINOPHEN PRN TAB: 10; 325 TABLET ORAL at 09:45

## 2017-11-13 RX ADMIN — MORPHINE SULFATE SCH MG: 30 TABLET, EXTENDED RELEASE ORAL at 08:33

## 2017-11-13 RX ADMIN — Medication SCH ML: at 08:32

## 2017-11-13 RX ADMIN — DULOXETINE SCH MG: 60 CAPSULE, DELAYED RELEASE ORAL at 08:30

## 2017-11-13 RX ADMIN — OXYCODONE HYDROCHLORIDE AND ACETAMINOPHEN PRN TAB: 10; 325 TABLET ORAL at 03:16

## 2017-11-13 NOTE — RADRPT
EXAM DATE/TIME:  11/13/2017 05:24 

 

[facility] COMPARISON:     

No previous studies available for comparison.

 

 

INDICATIONS :     

Chest pain; rule out pulmonary embolus.

                      

 

IV CONTRAST:     

75 cc Omnipaque 350 (iohexol) IV 

 

 

RADIATION DOSE:     

23.18 CTDIvol (mGy) 

 

 

MEDICAL HISTORY :     

Cardiovascular disease. Hypertension. 

 

SURGICAL HISTORY :      

Appendectomy. 

 

ENCOUNTER:      

Initial

 

ACUITY:      

1 day

 

PAIN SCALE:      

5/10

 

LOCATION:        

chest 

 

TECHNIQUE:     

Volumetric scanning of the chest was performed using a pulmonary embolism protocol MIP images were re
constructed.  Using automated exposure control and adjustment of the mA and/or kV according to patien
t size, radiation dose was kept as low as reasonably achievable to obtain optimal diagnostic quality 
images.   DICOM format image data is available electronically for review and comparison.  

 

Follow-up recommendations for detected pulmonary nodules are based at a minimum on nodule size and pa
tient risk factors according to Fleischner Society Guidelines.

 

FINDINGS:     

 

PULMONARY ARTERIES:

No filling defects are seen in the pulmonary arteries through the segmental level.

 

LUNGS:     

There is no consolidation or pneumothorax .  No concerning pulmonary nodule is visualized.

 

PLEURAE:     

There is no pleural thickening or pleural effusion.

 

MEDIASTINUM:     

There is good visualization of the great vessels of the middle mediastinum.  No evidence of mediastin
al or hilar adenopathy/mass.

 

MUSCULOSKELETAL:     

Within normal limits for patient age.

 

MISCELLANEOUS:     

The visualized upper abdominal organs demonstrate no acute abnormality.

 

CONCLUSION:     

Normal examination.  

 

 

 

 

 Te Joshi MD on November 13, 2017 at 5:48           

Board Certified Radiologist.

 This report was verified electronically.

## 2017-11-13 NOTE — HHI.DCPOC
Discharge Care Plan


Goals to Promote Your Health


* To prevent worsening of your condition and complications


* To maintain your health at the optimal level


Directions to Meet Your Goals


*** Take your medications as prescribed


*** Follow your dietary instruction


*** Follow activity as directed








*** Keep your appointments as scheduled


*** Take your immunizations and boosters as scheduled


*** If your symptoms worsen call your PCP, if no PCP go to Urgent Care Center 

or Emergency Room***


*** Smoking is Dangerous to Your Health. Avoid second hand smoke***


***Call the 24-hour hour crisis hotline for domestic abuse at 1-979.875.1082***











Sue Krishnamurthy Nov 13, 2017 12:05

## 2017-11-13 NOTE — EKG
Date Performed: 11/12/2017       Time Performed: 18:09:23

 

PTAGE:      56 years

 

EKG:      Sinus rhythm 

 

 NORMAL ECG

 

PREVIOUS TRACING       : 05/23/2017 17.17 Since previous tracing, no significant change noted

 

DOCTOR:   Tyson Goodman  Interpretating Date/Time  11/13/2017 13:37:17

## 2017-11-13 NOTE — RADRPT
EXAM DATE/TIME:  11/13/2017 10:01 

 

[facility] COMPARISON:     

CT PULMONARY ANGIOGRAM, November 13, 2017, 5:24.  MYOCARDIAL PERF PHARM SPECT, GATED W/EF, April 20, 2017, 12:31.

 

 

INDICATIONS :     

Mid chest pain for one day. Angina. 

                           

 

DOSE:     

26.6 mCi Tc99m Myoview at stress.

                     8.5 mCi Tc99m Myoview at rest.

                     0.4 mg Lexiscan

                       

 

 

STRESS SYMPTOMS:      

Shortness of breath with chest pressure.

                       

 

 

EJECTION FRACTION:       

57%

                       

 

MEDICAL HISTORY :     

Hypertension.   

 

SURGICAL HISTORY :      

Appendectomy.   

 

ENCOUNTER:     

Initial

 

ACUITY:     

1 day

 

PAIN SCALE:     

5/10

 

LOCATION:      

Midsternal chest 

 

TECHNIQUE:     

The patient underwent pharmacologic stress with infusion of prescribed dose.  Continuous ECG tracing 
was monitored during stress.  Gated SPECT imaging was performed after stress and conventional SPECT i
maging was performed at rest.  The examination was performed on a SPECT/CT scanner, both attenuation 
and non-corrected datasets were reviewed.

 

FINDINGS:     

 

DISTRIBUTION:     

The maximum perfused segment at stress is in the septal wall.

 

PERFUSION STUDY:     

The pattern of perfusion at stress is within normal limits.

 

GATED STUDY:     

There is intact wall motion and thickening without hypokinetic or dyskinetic segments. 

 

CONCLUSION:     

1. No reversible perfusion defect to indicate stress-induced myocardial ischemia identified.

 

RISK CATEGORY:     Low (<1% Annual Mortality Rate)

 

 

 

 

 Abdiaziz Gunter MD on November 13, 2017 at 11:34           

Board Certified Radiologist.

 This report was verified electronically.

## 2017-11-13 NOTE — HHI.DS
Discharge Summary


Admission Date


Nov 12, 2017 at 15:59


Discharge Date:  Nov 13, 2017


Admitting Diagnosis


chest pain





(1) Atypical chest pain


Diagnosis:  Principal


ICD Codes:  R07.89 - Other chest pain


Status:  Resolved


(2) Hypertension


Diagnosis:  Principal


ICD Codes:  I10 - Hypertension


Status:  Chronic


(3) Tobacco abuse


Diagnosis:  Principal


ICD Codes:  Z72.0 - Tobacco abuse


Status:  Chronic


(4) Aortic stenosis


Diagnosis:  Principal


ICD Codes:  I35.0 - Aortic stenosis


Status:  Chronic


Brief History


56 year old male with history of  hypertension and current smoker presented to 

ER for further evaluation of chest pain. Onset yesterday afternoon while 

walking his dog. S/P appendectomy x1 week ago.


Significant Findings





Laboratory Tests








Test


  11/12/17


17:40


 


Troponin I


  LESS THAN 0.02


NG/ML








Imaging





Last Impressions








Myocardial Perfusion Scan Nuc Med 11/13/17 0000 Signed





Impressions: 





 Service Date/Time:  Monday, November 13, 2017 10:01 - CONCLUSION:  1. No 





 reversible perfusion defect to indicate stress-induced myocardial ischemia 





 identified.  RISK CATEGORY:     Low (<1%% Annual Mortality Rate)      Abdiaziz Gunter MD 


 


CT Angiography 11/12/17 0000 Signed





Impressions: 





 Service Date/Time:  Monday, November 13, 2017 05:24 - CONCLUSION:  Normal 





 examination.        Te Joshi MD 








PE at Discharge


GENERAL: Alert WN, WD, NAD, pleasant,  male


HEAD: NC, AT


EYES: Sclera clear, conjunctiva without injection


ENT: Mucous membranes pink and moist


CV: RRR, 3/6 systolic murmur, no rub, gallop, or JVD, S1-S2 no S3-S4. Carotid 

bruits most likely referred sound from systolic murmur.  


RESP: Clear lungs throughout bilateral, no crackles, wheeze, rhonchi, 

symmetrical chest rise, nonlabored, able to speak in full sentences


ABD: Soft, NT, ND, no masses, positive bowel tones, x4 small puncture wounds 

open to air without redness, drainage, or swelling. 


EXT: Pulses +24, no dependent edema


MS: Normal tone 4 extremities, no obvious deformities, full range of motion, 

bilateral knee braces in place


NEURO: motor strength 5/5


PSYCH: A+O 3, pleasant affect, appropriate speech, insight and judgment


SKIN: Normal turgor, normal texture, tattoos


Pt Condition on Discharge:  Good


Discharge Disposition:  Discharge Home


Discharge Instructions


DIET: Follow Instructions for:  Heart Healthy Diet


Activities you can perform:  Regular-No Restrictions


Additional Information


Encouraged keeping follow up appointments with cardiologist and possible repeat 

echocardiogram prior to knee surgery.











Sue Krishnamurthy Nov 13, 2017 12:11

## 2017-11-13 NOTE — OTSOAPIP
PATIENT OFF THE FLOOR FOR TESTING. JT 

Therapist: Keerthi Stewart OTR/L

                          Signature on file

## 2017-11-13 NOTE — TR
Date Performed: 11/13/2017       Time Performed: 10:23:46

 

DOCTOR:      Tyson Goodman 

 

DRUG LIST:     

CLINICAL HISTORY:      ANGINA

REASON FOR TEST:      Angina

REASON FOR ENDING:     

OBSERVATION:     

CONCLUSION:      Lexiscan stress test was performed under standard four minute protocol.  Radionuclid
e was injected one minute prior to ending the test. No electrocardiographic abormalities were present
 to suggest ischemia. Nuclear imaging and interpretation are pending.

COMMENTS:

## 2017-11-22 NOTE — RADRPT
EXAM DATE/TIME:  04/19/2017 18:40 

 

[facility] COMPARISON:     

No previous studies available for comparison.

 

                     

INDICATIONS :     

Chest pain. 

                     

 

MEDICAL HISTORY :     

None.          

 

SURGICAL HISTORY :     

None.   

 

ENCOUNTER:     

Initial                                        

 

ACUITY:     

1 day      

 

PAIN SCORE:     

6/10

 

LOCATION:      

chest 

 

FINDINGS:     

A single view of the chest demonstrates the lungs to be symmetrically aerated without evidence of mas
s, infiltrate or effusion.  The cardiomediastinal contours are unremarkable.  Osseous structures are 
intact.

 

CONCLUSION:     No acute disease.  

 

 

 

 Betito Manuel Jr., MD on April 19, 2017 at 19:06           

Board Certified Radiologist.

 This report was verified electronically. The patient came in for her lab work today and was very frustrated that her orders weren`t in. Please put in ASAP as she needs to get them done today. Call her at [phone].

## 2018-03-23 ENCOUNTER — HOSPITAL ENCOUNTER (OUTPATIENT)
Dept: HOSPITAL 17 - NEDDLT | Age: 58
Setting detail: OBSERVATION
LOS: 6 days | Discharge: HOME | End: 2018-03-29
Attending: HOSPITALIST | Admitting: HOSPITALIST
Payer: MEDICARE

## 2018-03-23 VITALS
OXYGEN SATURATION: 95 % | RESPIRATION RATE: 20 BRPM | TEMPERATURE: 98.4 F | DIASTOLIC BLOOD PRESSURE: 62 MMHG | HEART RATE: 91 BPM | SYSTOLIC BLOOD PRESSURE: 117 MMHG

## 2018-03-23 VITALS — HEIGHT: 70 IN | BODY MASS INDEX: 25.82 KG/M2 | WEIGHT: 180.34 LBS
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
## 2018-03-23 DIAGNOSIS — F43.10: ICD-10-CM

## 2018-03-23 DIAGNOSIS — K59.00: ICD-10-CM

## 2018-03-23 DIAGNOSIS — F31.9: ICD-10-CM

## 2018-03-23 DIAGNOSIS — R11.2: ICD-10-CM

## 2018-03-23 DIAGNOSIS — Z82.49: ICD-10-CM

## 2018-03-23 DIAGNOSIS — R06.02: ICD-10-CM

## 2018-03-23 DIAGNOSIS — K57.30: ICD-10-CM

## 2018-03-23 DIAGNOSIS — F41.9: ICD-10-CM

## 2018-03-23 DIAGNOSIS — F12.90: ICD-10-CM

## 2018-03-23 DIAGNOSIS — R13.10: ICD-10-CM

## 2018-03-23 DIAGNOSIS — R01.1: ICD-10-CM

## 2018-03-23 DIAGNOSIS — R63.4: ICD-10-CM

## 2018-03-23 DIAGNOSIS — R07.89: Primary | ICD-10-CM

## 2018-03-23 DIAGNOSIS — I10: ICD-10-CM

## 2018-03-23 DIAGNOSIS — K29.70: ICD-10-CM

## 2018-03-23 DIAGNOSIS — Z79.82: ICD-10-CM

## 2018-03-23 DIAGNOSIS — Z98.1: ICD-10-CM

## 2018-03-23 DIAGNOSIS — F17.210: ICD-10-CM

## 2018-03-23 DIAGNOSIS — K64.8: ICD-10-CM

## 2018-03-23 DIAGNOSIS — I35.0: ICD-10-CM

## 2018-03-23 DIAGNOSIS — Z79.891: ICD-10-CM

## 2018-03-23 DIAGNOSIS — K92.1: ICD-10-CM

## 2018-03-23 DIAGNOSIS — R55: ICD-10-CM

## 2018-03-23 DIAGNOSIS — E78.5: ICD-10-CM

## 2018-03-23 DIAGNOSIS — I25.119: ICD-10-CM

## 2018-03-23 PROCEDURE — C1893 INTRO/SHEATH, FIXED,NON-PEEL: HCPCS

## 2018-03-23 PROCEDURE — 76937 US GUIDE VASCULAR ACCESS: CPT

## 2018-03-23 PROCEDURE — 88305 TISSUE EXAM BY PATHOLOGIST: CPT

## 2018-03-23 PROCEDURE — 96360 HYDRATION IV INFUSION INIT: CPT

## 2018-03-23 PROCEDURE — 87329 GIARDIA AG IA: CPT

## 2018-03-23 PROCEDURE — 87328 CRYPTOSPORIDIUM AG IA: CPT

## 2018-03-23 PROCEDURE — 80048 BASIC METABOLIC PNL TOTAL CA: CPT

## 2018-03-23 PROCEDURE — 85025 COMPLETE CBC W/AUTO DIFF WBC: CPT

## 2018-03-23 PROCEDURE — 71046 X-RAY EXAM CHEST 2 VIEWS: CPT

## 2018-03-23 PROCEDURE — 93306 TTE W/DOPPLER COMPLETE: CPT

## 2018-03-23 PROCEDURE — 80061 LIPID PANEL: CPT

## 2018-03-23 PROCEDURE — 87493 C DIFF AMPLIFIED PROBE: CPT

## 2018-03-23 PROCEDURE — 93458 L HRT ARTERY/VENTRICLE ANGIO: CPT

## 2018-03-23 PROCEDURE — 82550 ASSAY OF CK (CPK): CPT

## 2018-03-23 PROCEDURE — 85610 PROTHROMBIN TIME: CPT

## 2018-03-23 PROCEDURE — 93005 ELECTROCARDIOGRAM TRACING: CPT

## 2018-03-23 PROCEDURE — 85379 FIBRIN DEGRADATION QUANT: CPT

## 2018-03-23 PROCEDURE — 83880 ASSAY OF NATRIURETIC PEPTIDE: CPT

## 2018-03-23 PROCEDURE — 00813 ANES UPR LWR GI NDSC PX: CPT

## 2018-03-23 PROCEDURE — 82272 OCCULT BLD FECES 1-3 TESTS: CPT

## 2018-03-23 PROCEDURE — 83036 HEMOGLOBIN GLYCOSYLATED A1C: CPT

## 2018-03-23 PROCEDURE — G0378 HOSPITAL OBSERVATION PER HR: HCPCS

## 2018-03-23 PROCEDURE — 96374 THER/PROPH/DIAG INJ IV PUSH: CPT

## 2018-03-23 PROCEDURE — 97162 PT EVAL MOD COMPLEX 30 MIN: CPT

## 2018-03-23 PROCEDURE — 71275 CT ANGIOGRAPHY CHEST: CPT

## 2018-03-23 PROCEDURE — 83735 ASSAY OF MAGNESIUM: CPT

## 2018-03-23 PROCEDURE — 99152 MOD SED SAME PHYS/QHP 5/>YRS: CPT

## 2018-03-23 PROCEDURE — 45378 DIAGNOSTIC COLONOSCOPY: CPT

## 2018-03-23 PROCEDURE — 74176 CT ABD & PELVIS W/O CONTRAST: CPT

## 2018-03-23 PROCEDURE — 96376 TX/PRO/DX INJ SAME DRUG ADON: CPT

## 2018-03-23 PROCEDURE — 99285 EMERGENCY DEPT VISIT HI MDM: CPT

## 2018-03-23 PROCEDURE — 80053 COMPREHEN METABOLIC PANEL: CPT

## 2018-03-23 PROCEDURE — 93880 EXTRACRANIAL BILAT STUDY: CPT

## 2018-03-23 PROCEDURE — 85730 THROMBOPLASTIN TIME PARTIAL: CPT

## 2018-03-23 PROCEDURE — 99153 MOD SED SAME PHYS/QHP EA: CPT

## 2018-03-23 PROCEDURE — C1769 GUIDE WIRE: HCPCS

## 2018-03-23 PROCEDURE — 43239 EGD BIOPSY SINGLE/MULTIPLE: CPT

## 2018-03-23 PROCEDURE — 96361 HYDRATE IV INFUSION ADD-ON: CPT

## 2018-03-23 PROCEDURE — 84484 ASSAY OF TROPONIN QUANT: CPT

## 2018-03-23 PROCEDURE — 96375 TX/PRO/DX INJ NEW DRUG ADDON: CPT

## 2018-03-23 PROCEDURE — 97530 THERAPEUTIC ACTIVITIES: CPT

## 2018-03-24 VITALS
DIASTOLIC BLOOD PRESSURE: 69 MMHG | TEMPERATURE: 97.9 F | HEART RATE: 83 BPM | SYSTOLIC BLOOD PRESSURE: 107 MMHG | OXYGEN SATURATION: 98 % | RESPIRATION RATE: 20 BRPM

## 2018-03-24 VITALS
HEART RATE: 77 BPM | SYSTOLIC BLOOD PRESSURE: 140 MMHG | DIASTOLIC BLOOD PRESSURE: 74 MMHG | RESPIRATION RATE: 18 BRPM | OXYGEN SATURATION: 100 % | TEMPERATURE: 97.6 F

## 2018-03-24 VITALS
SYSTOLIC BLOOD PRESSURE: 148 MMHG | TEMPERATURE: 98 F | DIASTOLIC BLOOD PRESSURE: 95 MMHG | HEART RATE: 125 BPM | OXYGEN SATURATION: 100 % | RESPIRATION RATE: 20 BRPM

## 2018-03-24 VITALS — HEART RATE: 80 BPM

## 2018-03-24 VITALS
DIASTOLIC BLOOD PRESSURE: 64 MMHG | HEART RATE: 68 BPM | TEMPERATURE: 98 F | OXYGEN SATURATION: 95 % | RESPIRATION RATE: 16 BRPM | SYSTOLIC BLOOD PRESSURE: 113 MMHG

## 2018-03-24 VITALS
TEMPERATURE: 97.6 F | SYSTOLIC BLOOD PRESSURE: 111 MMHG | DIASTOLIC BLOOD PRESSURE: 76 MMHG | RESPIRATION RATE: 18 BRPM | HEART RATE: 84 BPM | OXYGEN SATURATION: 96 %

## 2018-03-24 VITALS
HEART RATE: 58 BPM | RESPIRATION RATE: 16 BRPM | OXYGEN SATURATION: 95 % | DIASTOLIC BLOOD PRESSURE: 56 MMHG | SYSTOLIC BLOOD PRESSURE: 88 MMHG | TEMPERATURE: 98.4 F

## 2018-03-24 VITALS — HEART RATE: 68 BPM

## 2018-03-24 VITALS — HEART RATE: 72 BPM

## 2018-03-24 RX ADMIN — OXYCODONE HYDROCHLORIDE AND ACETAMINOPHEN PRN TAB: 10; 325 TABLET ORAL at 08:17

## 2018-03-24 RX ADMIN — HYDROCHLOROTHIAZIDE SCH MG: 12.5 CAPSULE ORAL at 09:40

## 2018-03-24 RX ADMIN — OXYCODONE HYDROCHLORIDE AND ACETAMINOPHEN PRN TAB: 10; 325 TABLET ORAL at 21:01

## 2018-03-24 RX ADMIN — OXYCODONE HYDROCHLORIDE AND ACETAMINOPHEN PRN TAB: 10; 325 TABLET ORAL at 14:43

## 2018-03-24 RX ADMIN — DULOXETINE SCH MG: 60 CAPSULE, DELAYED RELEASE ORAL at 09:39

## 2018-03-24 RX ADMIN — NITROGLYCERIN SCH INCH: 20 OINTMENT TOPICAL at 23:52

## 2018-03-24 RX ADMIN — DULOXETINE SCH MG: 60 CAPSULE, DELAYED RELEASE ORAL at 21:02

## 2018-03-24 RX ADMIN — ASPIRIN SCH MG: 325 TABLET ORAL at 08:17

## 2018-03-24 RX ADMIN — CELECOXIB SCH MG: 200 CAPSULE ORAL at 14:44

## 2018-03-24 RX ADMIN — OXYCODONE HYDROCHLORIDE AND ACETAMINOPHEN PRN TAB: 10; 325 TABLET ORAL at 00:00

## 2018-03-24 RX ADMIN — LISINOPRIL SCH MG: 20 TABLET ORAL at 09:39

## 2018-03-24 RX ADMIN — NITROGLYCERIN SCH INCH: 20 OINTMENT TOPICAL at 20:10

## 2018-03-24 NOTE — ECHRPT
Indication:   CARDIOMYOPATHY

 

 CONCLUSIONS

 The left ventricular systolic function is normal with an estimated ejection fraction in the range of
 60-65%. 

 Normal left ventricular size. 

 Wall thickness is normal. 

 No regional wall motion abnormalities are present. 

 Diffuse calcification of the aortic valve. 

 Mild aortic valve stenosis. 

 Aortic valve mean gradient is 27.5 mmHg. 

 Mild to moderate aortic valve regurgitation. 

 There is trace tricuspid valve regurgitation. 

 The estimated pulmonary arterial pressure is 30.8 mmHg. 

 

 BP:        /         HR:                          Rhythm:           Sinus

 

 MEASUREMENTS  (Male / Female) Normal Values       Technical Quality:Good

 2D ECHO

 LV Diastolic Diameter PLAX        4.8 cm                4.2 - 5.9 / 3.9 - 5.3 cm

 LV Systolic Diameter PLAX         3.3 cm                

 IVS Diastolic Thickness           1.0 cm                0.6 - 1.0 / 0.6 - 0.9 cm

 LVPW Diastolic Thickness          1.0 cm                0.6 - 1.0 / 0.6 - 0.9 cm

 LV Relative Wall Thickness        0.4                   

 RV Internal Dim ED PLAX           3.3 cm                

 LVOT Diameter                     2.4 cm                

 LA Systolic Diameter LX           3.3 cm                3.0 - 4.0 / 2.7 - 3.8 cm

 LV Ejection Fraction MOD 4C       63.9 %                

 LV Ejection Fraction 4C AL        65.3 %                

 

 M-MODE

 Aortic Root Diameter MM           2.4 cm                

 LA Systolic Diameter MM           3.1 cm                

 LA Ao Ratio MM                    1.3                   

 AV Cusp Separation MM             0.9 cm                

 

 DOPPLER

 AV Peak Velocity                  349.5 cm/s            

 AV Peak Gradient                  48.9 mmHg             

 AV Mean Gradient                  27.5 mmHg             

 AV Velocity Time Integral         76.3 cm               

 AI Peak Velocity                  262.0 cm/s            

 AI Peak Gradient                  27.5 mmHg             

 AI Pressure Half Time             591.0 ms              

 LVOT Peak Velocity                109.0 cm/s            

 LVOT Peak Gradient                4.8 mmHg              

 LVOT Velocity Time Integral       24.1 cm               

 AV Area Cont Eq vti               1.4 cm               

 AV Area Cont Eq pk                1.4 cm               

 MV Area PHT                       4.4 cm               

 Mitral E Point Velocity           71.6 cm/s             

 Mitral A Point Velocity           65.6 cm/s             

 Mitral E to A Ratio               1.1                   

 LV E' Lateral Velocity            8.1 cm/s              

 Mitral E to LV E' Lateral Ratio   8.9                   

 LV E' Septal Velocity             5.1 cm/s              

 Mitral E to LV E' Septal Ratio    14.1                  

 TR Peak Velocity                  228.0 cm/s            

 TR Peak Gradient                  20.8 mmHg             

 Right Atrial Pressure             10.0 mmHg             

 Pulmonary Artery Systolic Pressu  30.8 mmHg             

 Right Ventricular Systolic Press  30.8 mmHg             

 PV Peak Velocity                  111.0 cm/s            

 PV Peak Gradient                  4.9 mmHg              

 

 

 FINDINGS

 

 LEFT VENTRICLE

 The left ventricular systolic function is normal with an estimated ejection fraction in the range of
 60-65%. 

 Normal left ventricular size. 

 Wall thickness is normal. 

 No regional wall motion abnormalities are present. 

 

 RIGHT VENTRICLE

 Normal right ventricular size and systolic function.  

 

 LEFT ATRIUM

 The left atrial size is normal.  

 

 RIGHT ATRIUM

 The right atrial size is normal.  

 

 ATRIAL SEPTUM

 Normal atrial septal thickness without atrial level shunting by limited color doppler interrogation.
  

 

 AORTA

 The aortic root and proximal ascending aorta are normal in size on limited imaging.  

 

 MITRAL VALVE

 Structurally normal mitral valve. No mitral valve stenosis or regurgitation.  

 

 AORTIC VALVE

 Cannot rule out a bicuspid aortic valve or trileaflet valve with partially fused commissure. 

 Diffuse calcification of the aortic valve. 

 Mild to moderate aortic valve stenosis. 

 Aortic valve area is 1.4 cm. 

 Aortic valve mean gradient is 27.5 mmHg. 

 Mild aortic valve regurgitation. 

 

 TRICUSPID VALVE

 Structurally normal tricuspid valve. 

 There is trace tricuspid valve regurgitation. 

 The estimated pulmonary arterial pressure is 30.8 mmHg. 

 

 PULMONARY VALVE

 No pulmonary valve regurgitation or stenosis.  

 

 VESSELS

 The inferior vena cava is normal in size.  

 

 PERICARDIUM

 No pericardial effusion.  

 

 

 

 

  Rafael Campo MD

  (Electronically Signed)

  Final Date:24 March 2018 17:08

## 2018-03-24 NOTE — PD.CARD.PN
Subjective


Subjective Remarks


Patient was discussed with the nurse practitioner, medical records and past 

medical records which are extensive were all reviewed.  The patient was seen 

and examined personally and a follow-up exam was carried out after he had a 

syncopal episode while here.


Very anxious gentleman referred from [facility] for chest pain.  He has some 

multiple visits to the hospital well documented in previous records.  He 

underwent a nuclear stress test approximately 6 months ago which was negative.  

However he has a reported history of aortic stenosis which supposedly is in the 

moderate range.  He has had no consistent follow-up with cardiology so the 

status of the valve is unclear.  He reports at this time recurring episodes of 

anginal-like chest pain but also several syncopal episodes.  He was evaluated 

and ruled out for an acute coronary syndrome today and is not a candidate for 

further stress testing particularly in view of his reported aortic stenosis.  

However while he was in the emergency room he went to the bathroom which was 

associated with some straining.  He began to develop chest pain and shortness 

of breath.  When he stood up from the commode he felt lightheaded and passed 

out.  He was found on the floor sitting against the wall.  He states that when 

he woke up on the floor he tried to stand but could not so he leaned against 

the wall.  He experienced no trauma from his loss of consciousness.





At this point he will require admission to the hospital since he cannot be 

safely discharged until this is been fully evaluated.  Repeat echocardiogram to 

determine possible severity of aortic stenosis would be of a primary concern at 

this time along with a cardiac consultation.





Objective


Medications





Current Medications








 Medications


  (Trade)  Dose


 Ordered  Sig/Lashawn


 Route  Start Time


 Stop Time Status Last Admin


 


  (NS Flush)  2 ml  UNSCH  PRN


 IV FLUSH  3/23/18 23:45


     


 


 


  (NS Flush)  2 ml  UNSCH  PRN


 IV FLUSH  3/23/18 23:45


     


 


 


  (Tylenol)  500 mg  Q4H  PRN


 PO  3/23/18 23:45


     


 


 


  (Zofran Inj)  4 mg  Q6H  PRN


 IV PUSH  3/23/18 23:45


     


 


 


  (Xanax)  2 mg  HS


 PO  3/23/18 23:45


    3/24/18 02:12


 


 


  (Percocet 


 Mg)  1 tab  Q6H  PRN


 PO  3/23/18 23:45


    3/24/18 08:17


 


 


  (Nitrostat Sl)  0.4 mg  Q5M  PRN


 SL  3/24/18 07:30


     


 


 


  (Aspirin)  325 mg  DAILY


 PO  3/24/18 09:00


    3/24/18 08:17


 


 


  (CeleBREX)  200 mg  DAILY


 PO  3/24/18 09:00


     


 


 


  (Cymbalta Dr)  60 mg  BID


 PO  3/24/18 09:00


    3/24/18 09:39


 


 


  (Prinivil)  20 mg  DAILY


 PO  3/24/18 09:00


    3/24/18 09:39


 


 


  (Microzide)  12.5 mg  DAILY


 PO  3/24/18 09:00


    3/24/18 09:40


 








Vital Signs / I&O





Vital Signs








  Date Time  Temp Pulse Resp B/P (MAP) Pulse Ox O2 Delivery O2 Flow Rate FiO2


 


3/24/18 11:04 97.6 77 18 140/74 (96) 100   


 


3/24/18 09:17   20     


 


3/24/18 07:31 98.0 125 20 148/95 (112) 100   


 


3/24/18 03:24 98.0 68 16 113/64 (80) 95   


 


3/23/18 20:44 98.4 91 20 117/62 (80) 95   








Physical Exam


Well-nourished well-developed but obviously anxious gentleman resting 

comfortably in bed


Neck revealed no no JVD masses or nodes.  The carotid upstroke appears to be 

somewhat slow and there are bilateral bruits


Chest is nontender and there are good breath sounds bilaterally with no rales 

wheezes or rhonchi


Cardiovascular the PMI is not particularly remarkable and no heave is 

appreciated.  However the common carotid and peripheral pulse upstroke does 

appear to be delayed.  There is a regular sinus rhythm with a grade 3/6 

systolic murmur over the aortic distribution radiating up to the carotids.


The abdomen is somewhat tender with healing scars from recent appendectomy


Imaging


Patient has had multiple radiologic studies including chest x-ray CT for 

pulmonary embolus CT and MRI of the brain scans of the back and a nuclear 

stress test all of which have been largely unremarkable





Assessment and Plan


Assessment and Plan


With reported aortic stenosis and now episodes of recurring chest pain and 

syncope admission to the hospital is necessary.  Evaluation of his aortic 

stenosis and further evaluation of syncope will be carried out on an inpatient 

basis.  If the aortic stenosis proves to be more significant consideration of 

intervention would be entertained.


Code Status


Full code


Discussed Condition With


This was discussed with the nurse practitioner and the patient











StonerLc MD Mar 24, 2018 11:29

## 2018-03-24 NOTE — MB
cc:

Rafael Campo MD

****

 

 

DATE:

03/24/2018

 

CHIEF COMPLAINT:

Chest pain and syncope.

 

HISTORY OF PRESENT ILLNESS:

The patient is a pleasant 57-year-old gentleman with history of severe

anxiety and PTSD, who has had several admissions for chest pain 

including a nonischemic nuclear stress test performed most recently 

11/2017 and then previously to that 04/2017.  He presented again to 

the [facility] with chest pain while he was riding his bike and some 

shortness of breath.  He was going to be discharged home after what 

was felt to be a nonischemic workup, but then he was in the bathroom, 

he says he began having chest pain again and he says he passed out.  

He is now asymptomatic, resting comfortably, though he says he has 

some mild chest tightness.

 

PAST MEDICAL HISTORY:

Aortic stenosis (mild to moderate, per his last echocardiogram by 

gradients), PTSD, anxiety, hypertension, mild tobacco abuse and 

bipolar disorder.

 

CURRENT MEDICATIONS:

1.  Aspirin 325 mg daily.

2.  Celebrex.

3.  Cymbalta.

4.  Prinivil 20 mg daily.

5.  Hydrochlorothiazide 12.5 mg daily.

6.  Xanax.

7.  Oxycodone.

 

ALLERGIES:

PHENOBARBITAL.

 

PHYSICAL EXAMINATION:

VITAL SIGNS:  Afebrile, pulse 77, respiratory rate 18, blood pressure 

140/74, saturating 100 on room air.

GENERAL:  Pleasant, anxious (asking for Xanax) gentleman, in no 

distress.

NECK:  No JVD.

LUNGS:  Clear to auscultation bilaterally.

CARDIOVASCULAR:  Regular rate and rhythm, somewhat distant heart 

sounds.  A II/VI systolic murmur is appreciated.

ABDOMEN:  Benign.

EXTREMITIES:  No edema.

 

LABORATORY DATA:

White count 8.3, hematocrit 41.9, platelets 292.  Sodium 141, 

potassium 2.7, chloride 106, bicarbonate 24, BUN 18, creatinine 1.1, 

glucose 112.  Cardiac enzymes are negative.

 

DIAGNOSTIC STUDIES:

EKG shows sinus tachycardia with nonspecific ST changes.  CTA of the 

chest showed no pulmonary embolism.

 

ASSESSMENT AND PLAN:

1.  Atypical chest pain.  The patient's chest pain has been ongoing 

for quite some time.  He has had 2 nuclear stress tests within the 

last 12 months, so repeating it does not seem to be particularly 

useful.  On the other hand, his symptoms do not seem quite typical 

enough to proceed directly to cardiac catheterization, given how long 

lasting they have been and his cardiac enzymes being normal.  We will 

have him undergo a CTA coronary.

 

2.  Syncope patient's syncope is unclear.  His aortic stenosis was not

particularly impressive last echo.    I will repeat his 

echocardiogram.

 

If there is no significant aortic stenosis on his echo and a CTA is 

negative, he could be discharged from my standpoint.

 

Thank you again for the opportunity to participate in this patient's 

care.

 

 

__________________________________

MD IVY Whittaker/MADY

D: 03/24/2018, 03:41 PM

T: 03/24/2018, 06:35 PM

Visit #: [id number]

Job #: [id number]

## 2018-03-24 NOTE — PD.CARD.PN
Subjective


Subjective Remarks


Notified by RN patient found in bathroom floor, somewhat unresponsive. Easily 

aroused, sitting on ground, without injury. No events noted on monitor. Reports 

during bowel movement developed left anterior chest pain and dyspnea. The next 

thing he remembers is waking up on bathroom floor. Sat himself up and pulled 

emergency bathroom call light for assistance. No dizziness prior to syncopal 

event.





Objective


Medications





Current Medications








 Medications


  (Trade)  Dose


 Ordered  Sig/Lashawn


 Route  Start Time


 Stop Time Status Last Admin


 


  (NS Flush)  2 ml  UNSCH  PRN


 IV FLUSH  3/23/18 23:45


     


 


 


  (NS Flush)  2 ml  UNSCH  PRN


 IV FLUSH  3/23/18 23:45


     


 


 


  (Tylenol)  500 mg  Q4H  PRN


 PO  3/23/18 23:45


     


 


 


  (Zofran Inj)  4 mg  Q6H  PRN


 IV PUSH  3/23/18 23:45


     


 


 


  (Xanax)  2 mg  HS


 PO  3/23/18 23:45


    3/24/18 02:12


 


 


  (Percocet 


 Mg)  1 tab  Q6H  PRN


 PO  3/23/18 23:45


    3/24/18 08:17


 


 


  (Nitrostat Sl)  0.4 mg  Q5M  PRN


 SL  3/24/18 07:30


     


 


 


  (Aspirin)  325 mg  DAILY


 PO  3/24/18 09:00


    3/24/18 08:17


 


 


  (CeleBREX)  200 mg  DAILY


 PO  3/24/18 09:00


     


 


 


  (Cymbalta Dr)  60 mg  BID


 PO  3/24/18 09:00


    3/24/18 09:39


 


 


  (Prinivil)  20 mg  DAILY


 PO  3/24/18 09:00


    3/24/18 09:39


 


 


  (Microzide)  12.5 mg  DAILY


 PO  3/24/18 09:00


    3/24/18 09:40


 








Vital Signs / I&O





Vital Signs








  Date Time  Temp Pulse Resp B/P (MAP) Pulse Ox O2 Delivery O2 Flow Rate FiO2


 


3/24/18 09:17   20     


 


3/24/18 07:31 98.0 125 20 148/95 (112) 100   


 


3/24/18 03:24 98.0 68 16 113/64 (80) 95   


 


3/23/18 20:44 98.4 91 20 117/62 (80) 95   








Physical Exam


GENERAL: Alert WN, WD, NAD,  male


HEAD: NC, AT


EYES: Sclera clear, conjunctiva without injection, pupils equal and round


ENT: Mucous membranes pink and moist


CV: RRR, 3/6 systolic murmur. 


RESP: Clear lungs throughout bilateral, no crackles, wheeze, rhonchi, 

symmetrical chest rise, nonlabored, able to speak in full sentences


ABD: Soft, NT, ND, no masses, positive bowel tones


MS: Normal tone 4 extremities, nontender, no obvious deformities, full range 

of motion


NEURO: CN II through CN XII grossly intact, motor strength 5/5


PSYCH: A+O 3, appropriate speech, appropriate mood, affect, insight and 

judgment


SKIN: Normal turgor, normal texture, no lesions, no rashes, brisk cap refill, 

even hair distribution, not clammy or diaphoretic





Assessment and Plan


Assessment and Plan


#1 Syncope episode-discussed with , plans to admit for known aortic 

stenosis and syncopal event, cancelling discharge. Discussed with patient who 

is agreeable to plan of care. Patient then seen and evaluated by Dr. Lam and 

myself. Discussed plan of care with Sue Beckwith Mar 24, 2018 11:07

## 2018-03-24 NOTE — HHI.HP
HPI


Primary Care Physician


[facility]


Chief Complaint


Chest pain


History of Present Illness


57 year old male with history of aortic stenosis, PTSD, anxiety, and 

hypertension presented to ER for further evaluation of chest pain. Onset 

yesterday 1000 while riding his bike. Location left anterior chest. 

Characterized as pressure. Severity 8/10. Radiation to left side of neck and 

left arm. Duration of severe chest pressure lasted 2 hours followed by waxing 

and waning pressure since. Associated symptoms included nausea and dyspnea. 

Denying vomiting or diaphoresis. No known precipitating or relieving factors.





Review of Systems


General: No fatigue,weakness, fever, chills, recent illness or change in 

appetite. Has been in his general state of health.


HEENT: No HA, no vision changes, no nasal congestion or drainage, no dysphasia


CV: Continues to have waxing and waning chest pressure as stated above.  No 

palpitations, intermittent leg pain, or dizziness.


RESP: No SOB, cough, wheeze, or recent URI. 


GI: No nausea, vomiting, bowel changes, diarrhea, constipation, pain, distention

, melena, or blood in the stool.  


: No dysuria, urgency, frequency. History of BPH.


EXT: No lower leg edema, no paraesthesias


MS: Chronic back pain and bilateral knee pain. No new discomforts, injury, 

trauma, or change in ROM


NEURO: No difficulty with balance or motor/sensory deficits. Reports x2 brief 

LOC episodes in last 2 months. Has not notified his PCP regarding this. Reports 

syncope episode witnessed, reporting brief syncope episode without warning.


PSYCH: Longstanding history of PTSD and anxiety. Reports stable on current 

medication regimen.


SKIN: No rashes, no concerning lesions





Past Family Social History


Allergies:  


Coded Allergies:  


     phenobarbital (Verified  Allergy, Unknown, unknown, 3/23/18)


Past Medical History


PTSD, anxiety, aortic stenosis, bipolar disorder, hypertension


Past Surgical History


Appendectomy, cervical fusion L4-L5, tonsillectomy


Reported Medications





Reported Meds & Active Scripts


Active


Reported


Testosterone Cypionate Inj (Testosterone Cypionate) 100 Mg/Ml Inj 100 Mg IM 

WEEKLY


Ms Contin (Morphine Sulfate) 30mg  BID


Xanax (Alprazolam) 2 Mg Tab 2 Mg QID 


Viagra (Sildenafil Citrate) 100 Mg Tab 100 Mg PO DAILY PRN


Cymbalta DR (Duloxetine HCl) 60 Mg Capdr 60 Mg PO QD


Lexapro PO QD


Aspirin EC (Aspirin) 81 Mg Tabdr 81 Mg PO DAILY


Celebrex (Celecoxib) 200 Mg Cap 200 Mg PO DAILY


Percocet (Oxycodone-Acetaminophen)  mg Tab 1 Tab PO Q4-6HR PRN


Simvastatin 20mg PO QD


Active Ordered Medications





Current Medications








 Medications


  (Trade)  Dose


 Ordered  Sig/Lashawn


 Route  Start Time


 Stop Time Status Last Admin


 


  (NS Flush)  2 ml  UNSCH  PRN


 IV FLUSH  3/23/18 23:45


     


 


 


  (NS Flush)  2 ml  UNSCH  PRN


 IV FLUSH  3/23/18 23:45


     


 


 


  (Tylenol)  500 mg  Q4H  PRN


 PO  3/23/18 23:45


     


 


 


  (Zofran Inj)  4 mg  Q6H  PRN


 IV PUSH  3/23/18 23:45


     


 


 


  (Xanax)  2 mg  HS


 PO  3/23/18 23:45


    3/24/18 02:12


 


 


  (Percocet 


 Mg)  1 tab  Q6H  PRN


 PO  3/23/18 23:45


    3/24/18 08:17


 


 


  (Nitrostat Sl)  0.4 mg  Q5M  PRN


 SL  3/24/18 07:30


     


 


 


  (Aspirin)  325 mg  DAILY


 PO  3/24/18 09:00


    3/24/18 08:17


 








Family History


Positive for early onset cardiovascular disease. Father had x7 MI before age of 

50, dying age 60 due to complications of CHF. Mother CABG age 80. Younger 

brother x3 cardiac stents.


Social History


Known HLD and HTN. No known CAD or DM.


Current smoker, reports 2-3 cigarettes daily. 


Disabled. 





Past cardiac testing


17 Lexiscan-no reversible perfusion defects.


17 Lexiscan-no appreciable ischemia.


17 Echocardiogram-EF 55-65%. Mild to moderate aortic stenosis. 


Reports remote cardiac catheterization revealing small blockages, no 

intervention required. He is unsure when catheterization was completed. 


Does not follow with a cardiologist.





Physical Exam


Vital Signs





Vital Signs








  Date Time  Temp Pulse Resp B/P (MAP) Pulse Ox O2 Delivery O2 Flow Rate FiO2


 


3/24/18 07:31 98.0 125 20 148/95 (112) 100   


 


3/24/18 03:24 98.0 68 16 113/64 (80) 95   


 


3/23/18 20:44 98.4 91 20 117/62 (80) 95   








Physical Exam


GENERAL: Alert WN, WD, NAD, pleasant,  male


HEAD: NC, AT


EYES: Sclera clear, conjunctiva without injection, pupils equal and round


ENT: Mucous membranes pink and moist, no nasal discharge or bleeding


NECK: Supple, no masses, trachea midline


CV: RRR, 3/6 systolic murmur, no rub, no gallop, no JVD, S1-S2 no S3-S4.  

Bilateral carotid bruits was likely refer sound from cardiac murmur. 


RESP: Clear lungs throughout bilateral, no crackles, wheeze, rhonchi, 

symmetrical chest rise, nonlabored, able to speak in full sentences


ABD: Soft, NT, ND, no masses, positive bowel tones


BACK: No CVAT, no scoliosis


EXT: Pulses +24, no dependent edema


MS: Normal tone 4 extremities, nontender, no obvious deformities, full range 

of motion


NEURO: CN II through CN XII grossly intact, motor strength 5/5, gait WNL


PSYCH: A+O 3, pleasant affect, appropriate speech, appropriate mood and affect

, insight and judgment


SKIN: Normal turgor, normal texture, no lesions, no rashes, brisk cap refill, 

even hair distribution


Laboratory


Initial laboratory completed [facility]. CBC and CMP unremarkable. D-dimer 

.51BNP 21, troponin 0.02 x3.


Imaging


Chest x-ray CT pulmonary completed in [facility] and interpreted by 

radiologist. Chest xray no acute cardiopulmonary process. CT pulmonary angiogram

- no evidence for PE


Course


EKG


NSR, no st t segment changes





Caprini VTE Risk Assessment


Caprini VTE Risk Assessment:  No/Low Risk (score <= 1)


Caprini Risk Assessment Model











 Point Value = 1          Point Value = 2  Point Value = 3  Point Value = 5


 


Age 41-60


Minor surgery


BMI > 25 kg/m2


Swollen legs


Varicose veins


Pregnancy or postpartum


History of unexplained or recurrent


   spontaneous 


Oral contraceptives or hormone


   replacement


Sepsis (< 1 month)


Serious lung disease, including


   pneumonia (< 1 month)


Abnormal pulmonary function


Acute myocardial infarction


Congestive heart failure (< 1 month)


History of inflammatory bowel disease


Medical patient at bed rest Age 61-74


Arthroscopic surgery


Major open surgery (> 45 min)


Laparoscopic surgery (> 45 min)


Malignancy


Confined to bed (> 72 hours)


Immobilizing plaster cast


Central venous access Age >= 75


History of VTE


Family history of VTE


Factor V Leiden


Prothrombin 87628L


Lupus anticoagulant


Anticardiolipin antibodies


Elevated serum homocysteine


Heparin-induced thrombocytopenia


Other congenital or acquired


   thrombophilia Stroke (< 1 month)


Elective arthroplasty


Hip, pelvis, or leg fracture


Acute spinal cord injury (< 1 month)








Prophylaxis Regimen











   Total Risk


Factor Score Risk Level Prophylaxis Regimen


 


0-1      Low Early ambulation


 


2 Moderate Order ONE of the following:


*Sequential Compression Device (SCD)


*Heparin 5000 units SQ BID


 


3-4 Higher Order ONE of the following medications:


*Heparin 5000 units SQ TID


*Enoxaparin/Lovenox 40 mg SQ daily (WT < 150 kg, CrCl > 30 mL/min)


*Enoxaparin/Lovenox 30 mg SQ daily (WT < 150 kg, CrCl > 10-29 mL/min)


*Enoxaparin/Lovenox 30 mg SQ BID (WT < 150 kg, CrCl > 30 mL/min)


AND/OR


*Sequential Compression Device (SCD)


 


5 or more Highest Order ONE of the following medications:


*Heparin 5000 units SQ TID (Preferred with Epidurals)


*Enoxaparin/Lovenox 40 mg SQ daily (WT < 150 kg, CrCl > 30 mL/min)


*Enoxaparin/Lovenox 30 mg SQ daily (WT < 150 kg, CrCl > 10-29 mL/min)


*Enoxaparin/Lovenox 30 mg SQ BID (WT < 150 kg, CrCl > 30 mL/min)


AND


*Sequential Compression Device (SCD)











Assessment and Plan


Assessment and Plan


#1 Chest pain-admitted to chest pain center. Ruled out with 3 sets of EKGs and 

cardiac enzymes. Two recent unremarkable chemical stress testing within last 

year. Discussed importance appointments with cardiologist and especially his 

PCP. Mostly likely no further cardiac testing will be required. This will be 

determined by cardiologist. Patient agreeable to plan of care. 


#2 History of aortic stenosis-discussed importance of following up with his PCP 

for echocardiogram, last echocardiogram 2017 EF 55-65% with mild to moderate 

aortic stenosis. 


#3 History of anxiety-Xanax 1mg po x1 dose now


#4 Tobacco use-strongly encouraged and stressed importance of tobacco 

cessation. Instructed to quit smoking.











Sue Krishnamurthy Mar 24, 2018 08:43

## 2018-03-25 VITALS
HEART RATE: 72 BPM | OXYGEN SATURATION: 97 % | DIASTOLIC BLOOD PRESSURE: 62 MMHG | SYSTOLIC BLOOD PRESSURE: 112 MMHG | RESPIRATION RATE: 18 BRPM | TEMPERATURE: 97.6 F

## 2018-03-25 VITALS
DIASTOLIC BLOOD PRESSURE: 60 MMHG | TEMPERATURE: 98.7 F | OXYGEN SATURATION: 97 % | HEART RATE: 76 BPM | SYSTOLIC BLOOD PRESSURE: 114 MMHG | RESPIRATION RATE: 16 BRPM

## 2018-03-25 VITALS
TEMPERATURE: 97.5 F | HEART RATE: 70 BPM | OXYGEN SATURATION: 98 % | SYSTOLIC BLOOD PRESSURE: 110 MMHG | RESPIRATION RATE: 18 BRPM | DIASTOLIC BLOOD PRESSURE: 61 MMHG

## 2018-03-25 VITALS
DIASTOLIC BLOOD PRESSURE: 64 MMHG | SYSTOLIC BLOOD PRESSURE: 118 MMHG | HEART RATE: 54 BPM | TEMPERATURE: 98.7 F | RESPIRATION RATE: 18 BRPM | OXYGEN SATURATION: 95 %

## 2018-03-25 VITALS
SYSTOLIC BLOOD PRESSURE: 126 MMHG | OXYGEN SATURATION: 99 % | DIASTOLIC BLOOD PRESSURE: 63 MMHG | HEART RATE: 71 BPM | RESPIRATION RATE: 17 BRPM | TEMPERATURE: 96.6 F

## 2018-03-25 VITALS — HEART RATE: 75 BPM

## 2018-03-25 RX ADMIN — OXYCODONE HYDROCHLORIDE AND ACETAMINOPHEN PRN TAB: 10; 325 TABLET ORAL at 21:39

## 2018-03-25 RX ADMIN — NITROGLYCERIN SCH INCH: 20 OINTMENT TOPICAL at 06:00

## 2018-03-25 RX ADMIN — DULOXETINE SCH MG: 60 CAPSULE, DELAYED RELEASE ORAL at 09:35

## 2018-03-25 RX ADMIN — NITROGLYCERIN SCH INCH: 20 OINTMENT TOPICAL at 17:14

## 2018-03-25 RX ADMIN — OXYCODONE HYDROCHLORIDE AND ACETAMINOPHEN PRN TAB: 10; 325 TABLET ORAL at 03:11

## 2018-03-25 RX ADMIN — ISOSORBIDE MONONITRATE SCH MG: 30 TABLET, EXTENDED RELEASE ORAL at 11:30

## 2018-03-25 RX ADMIN — ASPIRIN SCH MG: 325 TABLET ORAL at 09:33

## 2018-03-25 RX ADMIN — METOPROLOL TARTRATE SCH MG: 25 TABLET, FILM COATED ORAL at 20:45

## 2018-03-25 RX ADMIN — CELECOXIB SCH MG: 200 CAPSULE ORAL at 09:35

## 2018-03-25 RX ADMIN — NITROGLYCERIN SCH INCH: 20 OINTMENT TOPICAL at 12:00

## 2018-03-25 RX ADMIN — DULOXETINE SCH MG: 60 CAPSULE, DELAYED RELEASE ORAL at 20:48

## 2018-03-25 RX ADMIN — METOPROLOL TARTRATE SCH MG: 25 TABLET, FILM COATED ORAL at 15:31

## 2018-03-25 RX ADMIN — HYDROCHLOROTHIAZIDE SCH MG: 12.5 CAPSULE ORAL at 09:34

## 2018-03-25 RX ADMIN — ESCITALOPRAM OXALATE SCH MG: 20 TABLET ORAL at 15:30

## 2018-03-25 RX ADMIN — OXYCODONE HYDROCHLORIDE AND ACETAMINOPHEN PRN TAB: 10; 325 TABLET ORAL at 15:36

## 2018-03-25 RX ADMIN — OXYCODONE HYDROCHLORIDE AND ACETAMINOPHEN PRN TAB: 10; 325 TABLET ORAL at 09:35

## 2018-03-25 RX ADMIN — LISINOPRIL SCH MG: 20 TABLET ORAL at 09:33

## 2018-03-25 RX ADMIN — STANDARDIZED SENNA CONCENTRATE AND DOCUSATE SODIUM SCH TAB: 8.6; 5 TABLET, FILM COATED ORAL at 23:51

## 2018-03-25 NOTE — PD.CARD.PN
Subjective


Subjective Remarks


Pt had more "severe chest pain"





Objective


Medications





Current Medications








 Medications


  (Trade)  Dose


 Ordered  Sig/Lashawn


 Route  Start Time


 Stop Time Status Last Admin


 


  (NS Flush)  2 ml  UNSCH  PRN


 IV FLUSH  3/23/18 23:45


     


 


 


  (NS Flush)  2 ml  UNSCH  PRN


 IV FLUSH  3/23/18 23:45


     


 


 


  (Tylenol)  500 mg  Q4H  PRN


 PO  3/23/18 23:45


     


 


 


  (Zofran Inj)  4 mg  Q6H  PRN


 IV PUSH  3/23/18 23:45


     


 


 


  (Xanax)  2 mg  HS


 PO  3/23/18 23:45


    3/24/18 21:01


 


 


  (Percocet 


 Mg)  1 tab  Q6H  PRN


 PO  3/23/18 23:45


    3/25/18 09:35


 


 


  (Nitrostat Sl)  0.4 mg  Q5M  PRN


 SL  3/24/18 07:30


     


 


 


  (Aspirin)  325 mg  DAILY


 PO  3/24/18 09:00


    3/25/18 09:33


 


 


  (CeleBREX)  200 mg  DAILY


 PO  3/24/18 09:00


    3/25/18 09:35


 


 


  (Cymbalta Dr)  60 mg  BID


 PO  3/24/18 09:00


    3/25/18 09:35


 


 


  (Prinivil)  20 mg  DAILY


 PO  3/24/18 09:00


    3/25/18 09:33


 


 


  (Microzide)  12.5 mg  DAILY


 PO  3/24/18 09:00


    3/25/18 09:34


 


 


  (Nitroglycerin


 2% Oint)  1 inch  Q6H


 TOPICAL  3/24/18 18:00


    3/24/18 20:10


 








Vital Signs / I&O





Vital Signs








  Date Time  Temp Pulse Resp B/P (MAP) Pulse Ox O2 Delivery O2 Flow Rate FiO2


 


3/25/18 08:00 97.5 70 18 110/61 (77) 98   


 


3/25/18 05:33        21


 


3/25/18 04:11   18     


 


3/25/18 03:38 98.7 76 16 114/60 (78) 97   


 


3/24/18 23:53 98.4 58 16 88/56 (67) 95   


 


3/24/18 23:00  68      


 


3/24/18 20:23 97.9 83 20 107/69 (82) 98   


 


3/24/18 16:30 97.6 84 18 111/76 (88) 96   


 


3/24/18 15:00  72      














I/O      


 


 3/24/18 3/24/18 3/24/18 3/25/18 3/25/18 3/25/18





 07:00 15:00 23:00 07:00 15:00 23:00


 


Intake Total   800 ml 340 ml  


 


Balance   800 ml 340 ml  


 


      


 


Intake Oral   800 ml 340 ml  


 


# Voids   6 3  








Physical Exam


GENERAL: This is a well-nourished, well-developed patient, in no apparent 

distress.


CARDIOVASCULAR: Regular rate and rhythm without murmurs, gallops, or rubs. 


RESPIRATORY: Clear to auscultation. Breath sounds equal bilaterally. No wheezes

, rales, or rhonchi.  


GASTROINTESTINAL: Abdomen soft, non-tender, nondistended. Normal active bowel 

sounds


MUSCULOSKELETAL: Extremities without clubbing, cyanosis, or edema.


NEURO:  Alert & Oriented x4 to person, place, time, situation.  Moves all ext x4





Assessment and Plan


Problem List:  


(1) Chest pain


ICD Codes:  R07.9 - Chest pain, unspecified


Status:  Acute


Plan:  Though fairly low suspicion for significant disease based on his 

multiple nuclear stress tests, will plan for cath given refractory symptoms. On 

Imdur/lopressor for dual anti-anginals





(2) Chronic pain


ICD Codes:  G89.29 - Other chronic pain


Status:  Acute


(3) Aortic stenosis


ICD Codes:  I35.0 - Aortic stenosis


Status:  Chronic


Plan:  echo pending.





Assessment and Plan


NPO past midnight, Dr. Lara will cath in the AM;





Problem Qualifiers





(1) Aortic stenosis:  


Qualified Codes:  I35.0 - Nonrheumatic aortic (valve) stenosis








Rafael Campo MD Mar 25, 2018 11:22

## 2018-03-25 NOTE — HHI.PR
Subjective


Remarks


Follow up for chest pain. The patient reports continued intermittent left 

anterior chest pain without radiation rated 8/10, associated with intermittent 

nausea and shortness of breath, denies diaphoresis or vomiting. The patient 

also admits to severe anxiety and PTSD, requesting his xanax. He reports taking 

xanax 1mg qid prn. He follows with PCP only for his anxiety currently but has 

seen psychiatry in the past. He reports recent history of ECT when he was 

following with psychiatry. His PCP is at [facility] in [place]. Discussed him obtaining referral to psychiatrist through his PCP, patient 

verbalized understanding.





Objective


Vitals





Vital Signs








  Date Time  Temp Pulse Resp B/P (MAP) Pulse Ox O2 Delivery O2 Flow Rate FiO2


 


3/25/18 08:00 97.5 70 18 110/61 (77) 98   


 


3/25/18 05:33        21


 


3/25/18 04:11   18     


 


3/25/18 03:38 98.7 76 16 114/60 (78) 97   


 


3/24/18 23:53 98.4 58 16 88/56 (67) 95   


 


3/24/18 23:00  68      


 


3/24/18 20:23 97.9 83 20 107/69 (82) 98   


 


3/24/18 16:30 97.6 84 18 111/76 (88) 96   


 


3/24/18 15:00  72      


 


3/24/18 11:04 97.6 77 18 140/74 (96) 100   














I/O      


 


 3/24/18 3/24/18 3/24/18 3/25/18 3/25/18 3/25/18





 07:00 15:00 23:00 07:00 15:00 23:00


 


Intake Total   800 ml 340 ml  


 


Balance   800 ml 340 ml  


 


      


 


Intake Oral   800 ml 340 ml  


 


# Voids   6 3  








Objective Remarks


GENERAL: Well-nourished, well-developed middle aged male patient in [facility].


SKIN: Warm and dry. No rash.


HEENT:  Normocephalic. Atraumatic.Pupils equal and round.   Mucous membranes 

pink and moist.


CARDIOVASCULAR: Regular rate and rhythm.  S1, S2 noted. No murmur appreciated. 

No chest wall tenderness to palpation. 


RESPIRATORY: No accessory muscle use. Clear to auscultation. Breath sounds 

equal bilaterally.  


GASTROINTESTINAL: Abdomen soft, non-tender, nondistended. Normoactive bowel 

sounds x4.


MUSCULOSKELETAL: No obvious deformities. Extremities without clubbing, cyanosis

, or edema. 


NEUROLOGICAL: Awake and alert. No obvious cranial nerve deficits.  Motor 

grossly within normal limits. Normal speech.


PSYCHIATRIC: Anxious mood; insight and judgment normal.


Medications and IVs





Current Medications








 Medications


  (Trade)  Dose


 Ordered  Sig/Lashawn


 Route  Start Time


 Stop Time Status Last Admin


 


  (NS Flush)  2 ml  UNSCH  PRN


 IV FLUSH  3/23/18 23:45


     


 


 


  (NS Flush)  2 ml  UNSCH  PRN


 IV FLUSH  3/23/18 23:45


     


 


 


  (Tylenol)  500 mg  Q4H  PRN


 PO  3/23/18 23:45


     


 


 


  (Zofran Inj)  4 mg  Q6H  PRN


 IV PUSH  3/23/18 23:45


     


 


 


  (Xanax)  2 mg  HS


 PO  3/23/18 23:45


    3/24/18 21:01


 


 


  (Percocet 


 Mg)  1 tab  Q6H  PRN


 PO  3/23/18 23:45


    3/25/18 09:35


 


 


  (Nitrostat Sl)  0.4 mg  Q5M  PRN


 SL  3/24/18 07:30


     


 


 


  (Aspirin)  325 mg  DAILY


 PO  3/24/18 09:00


    3/25/18 09:33


 


 


  (CeleBREX)  200 mg  DAILY


 PO  3/24/18 09:00


   Future Hold 3/25/18 09:35


 


 


  (Cymbalta Dr)  60 mg  BID


 PO  3/24/18 09:00


    3/25/18 09:35


 


 


  (Prinivil)  20 mg  DAILY


 PO  3/24/18 09:00


    3/25/18 09:33


 


 


  (Microzide)  12.5 mg  DAILY


 PO  3/24/18 09:00


   Future Hold 3/25/18 09:34


 


 


  (Nitroglycerin


 2% Oint)  1 inch  Q6H


 TOPICAL  3/24/18 18:00


    3/24/18 20:10


 


 


  (Imdur)  30 mg  DAILY@07


 PO  3/25/18 11:30


     


 


 


  (Lopressor)  12.5 mg  Q12HR


 PO  3/25/18 11:30


     


 


 


  (Pill Splitter)  1 ea  UNSCH  PRN


 OTHER  3/25/18 11:30


     


 











A/P


Assessment and Plan


57-year-old male with history of anxiety, PTSD, bipolar disorder, mild aortic 

stenosis, hypertension, presents with intermittent chest pain.  Initially 

admitted to [place] ER, then transferred to [facility] chest pain center, 

however patient had syncopal episode while in the hospital, found with brief 

episode of unresponsiveness, therefore admitted to hospitalist for further 

evaluation. 





Chest pain: atypical. 


   -EMR Reviewed, Nuclear Stress Test 11/13/17 showed no reversible perfusion 

defect to indicate stress-induced ischemia.  


   -ACS ruled out on 3/23 at [place] with negative serial cardiac enzymes x3 

and EKG without acute ischemic changes


   -CT-PA on 3/23 also negative, no pulmonary embolism


   -Echocardiogram 3/24 with EF 60-65%, mild AS, mild-mod AR, trace TR


   -Continue aspirin, imdur, ACE, BB, nitro ointment


   -Consulted cardiology


   -Discussed with Dr. Lara, patient with ongoing chest pain, will plan for 

cardiac catheterization tomorrow 3/26





Syncope: report from chest pain center and nursing staff reports questionable 

episode of syncope after patient was discharged, not witnessed by staff


   -echocardiogram unremarkable as above, mild AS unlikely cause of syncope


   -ruling out ACS as above


   -carotid U/S with no stenosis


   -check orthostatics


   -no further syncopal events





Mild Aortic Stenosis: unchanged


   -unlikely etiology of patient's symptoms


   -continue outpatient f/up with cardiology





Hypertension: BP soft


   -continued patient's home meds including lisinopril, hold HCTZ


   -also started on metoprolol and imdur for ongoing chest pain


   -may need to discontinue HCTZ at discharge and decrease dose of lisinopril 

if BP continues to be low





Anxiety/PTSD/Bipolar Disorder: patient with severe anxiety on exam


   -continue patient's Lexapro, Xanax prn, Cymbalta, Celebrex


   -strongly encouraged outpatient f/up with PCP and psychiatrist upon discharge





DVT Prophylaxis: teds/SCDs











Yaz Millan PA-C Mar 25, 2018 10:23 am

## 2018-03-25 NOTE — EKG
Date Performed: 03/24/2018       Time Performed: 17:50:11

 

PTAGE:      57 years

 

EKG:      Sinus rhythm 

 

 NONSPECIFIC T-WAVE ABNORMALITY Since the previous tracing, no significant change noted BORDERLINE EC
G

 

PREVIOUS TRACING       : 11/12/2017 18.09

 

DOCTOR:   Lissette Girard  Interpretating Date/Time  03/25/2018 19:52:11

## 2018-03-26 VITALS
DIASTOLIC BLOOD PRESSURE: 75 MMHG | SYSTOLIC BLOOD PRESSURE: 108 MMHG | TEMPERATURE: 96.5 F | OXYGEN SATURATION: 98 % | RESPIRATION RATE: 17 BRPM | HEART RATE: 68 BPM

## 2018-03-26 VITALS
RESPIRATION RATE: 17 BRPM | SYSTOLIC BLOOD PRESSURE: 97 MMHG | DIASTOLIC BLOOD PRESSURE: 62 MMHG | TEMPERATURE: 95.5 F | HEART RATE: 64 BPM | OXYGEN SATURATION: 100 %

## 2018-03-26 VITALS
OXYGEN SATURATION: 96 % | RESPIRATION RATE: 18 BRPM | TEMPERATURE: 97.8 F | HEART RATE: 76 BPM | SYSTOLIC BLOOD PRESSURE: 119 MMHG | DIASTOLIC BLOOD PRESSURE: 57 MMHG

## 2018-03-26 VITALS
RESPIRATION RATE: 18 BRPM | DIASTOLIC BLOOD PRESSURE: 60 MMHG | HEART RATE: 60 BPM | TEMPERATURE: 98.9 F | OXYGEN SATURATION: 98 % | SYSTOLIC BLOOD PRESSURE: 89 MMHG

## 2018-03-26 VITALS
TEMPERATURE: 98.1 F | DIASTOLIC BLOOD PRESSURE: 54 MMHG | SYSTOLIC BLOOD PRESSURE: 114 MMHG | OXYGEN SATURATION: 97 % | RESPIRATION RATE: 17 BRPM | HEART RATE: 61 BPM

## 2018-03-26 VITALS — HEART RATE: 78 BPM

## 2018-03-26 VITALS — HEART RATE: 68 BPM

## 2018-03-26 LAB
ALBUMIN SERPL-MCNC: 3.3 GM/DL (ref 3.4–5)
ALP SERPL-CCNC: 102 U/L (ref 45–117)
ALT SERPL-CCNC: 26 U/L (ref 12–78)
AST SERPL-CCNC: 19 U/L (ref 15–37)
BASOPHILS # BLD AUTO: 0 TH/MM3 (ref 0–0.2)
BASOPHILS NFR BLD: 0.3 % (ref 0–2)
BILIRUB SERPL-MCNC: 0.6 MG/DL (ref 0.2–1)
BUN SERPL-MCNC: 29 MG/DL (ref 7–18)
CALCIUM SERPL-MCNC: 8.7 MG/DL (ref 8.5–10.1)
CHLORIDE SERPL-SCNC: 98 MEQ/L (ref 98–107)
CHOLEST SERPL-MCNC: 180 MG/DL (ref 120–200)
CHOLESTEROL/ HDL RATIO: 3.54 RATIO
CREAT SERPL-MCNC: 1.16 MG/DL (ref 0.6–1.3)
EOSINOPHIL # BLD: 0.1 TH/MM3 (ref 0–0.4)
EOSINOPHIL NFR BLD: 1 % (ref 0–4)
ERYTHROCYTE [DISTWIDTH] IN BLOOD BY AUTOMATED COUNT: 15.5 % (ref 11.6–17.2)
GFR SERPLBLD BASED ON 1.73 SQ M-ARVRAT: 64 ML/MIN (ref 89–?)
GLUCOSE SERPL-MCNC: 90 MG/DL (ref 74–106)
HCO3 BLD-SCNC: 24.4 MEQ/L (ref 21–32)
HCT VFR BLD CALC: 43.3 % (ref 39–51)
HDLC SERPL-MCNC: 50.8 MG/DL (ref 40–60)
HGB BLD-MCNC: 14.9 GM/DL (ref 13–17)
LDLC SERPL-MCNC: 74 MG/DL (ref 0–99)
LYMPHOCYTES # BLD AUTO: 1.9 TH/MM3 (ref 1–4.8)
LYMPHOCYTES NFR BLD AUTO: 14.9 % (ref 9–44)
MCH RBC QN AUTO: 31.1 PG (ref 27–34)
MCHC RBC AUTO-ENTMCNC: 34.3 % (ref 32–36)
MCV RBC AUTO: 90.5 FL (ref 80–100)
MONOCYTE #: 1.1 TH/MM3 (ref 0–0.9)
MONOCYTES NFR BLD: 8.3 % (ref 0–8)
NEUTROPHILS # BLD AUTO: 9.8 TH/MM3 (ref 1.8–7.7)
NEUTROPHILS NFR BLD AUTO: 75.5 % (ref 16–70)
PLATELET # BLD: 322 TH/MM3 (ref 150–450)
PMV BLD AUTO: 7.8 FL (ref 7–11)
PROT SERPL-MCNC: 7.1 GM/DL (ref 6.4–8.2)
RBC # BLD AUTO: 4.78 MIL/MM3 (ref 4.5–5.9)
SODIUM SERPL-SCNC: 131 MEQ/L (ref 136–145)
TRIGL SERPL-MCNC: 278 MG/DL (ref 42–150)
WBC # BLD AUTO: 13 TH/MM3 (ref 4–11)

## 2018-03-26 RX ADMIN — STANDARDIZED SENNA CONCENTRATE AND DOCUSATE SODIUM SCH TAB: 8.6; 5 TABLET, FILM COATED ORAL at 20:57

## 2018-03-26 RX ADMIN — NITROGLYCERIN SCH INCH: 20 OINTMENT TOPICAL at 06:00

## 2018-03-26 RX ADMIN — METOPROLOL TARTRATE SCH MG: 25 TABLET, FILM COATED ORAL at 21:00

## 2018-03-26 RX ADMIN — OXYCODONE HYDROCHLORIDE AND ACETAMINOPHEN PRN TAB: 10; 325 TABLET ORAL at 17:00

## 2018-03-26 RX ADMIN — PHENYTOIN SODIUM SCH MLS/HR: 50 INJECTION INTRAMUSCULAR; INTRAVENOUS at 13:15

## 2018-03-26 RX ADMIN — NITROGLYCERIN SCH INCH: 20 OINTMENT TOPICAL at 18:00

## 2018-03-26 RX ADMIN — ISOSORBIDE MONONITRATE SCH MG: 30 TABLET, EXTENDED RELEASE ORAL at 06:19

## 2018-03-26 RX ADMIN — ONDANSETRON PRN MG: 2 INJECTION, SOLUTION INTRAMUSCULAR; INTRAVENOUS at 12:38

## 2018-03-26 RX ADMIN — NITROGLYCERIN SCH INCH: 20 OINTMENT TOPICAL at 12:00

## 2018-03-26 RX ADMIN — ONDANSETRON PRN MG: 2 INJECTION, SOLUTION INTRAMUSCULAR; INTRAVENOUS at 17:37

## 2018-03-26 RX ADMIN — PHENYTOIN SODIUM SCH MLS/HR: 50 INJECTION INTRAMUSCULAR; INTRAVENOUS at 23:22

## 2018-03-26 RX ADMIN — METOPROLOL TARTRATE SCH MG: 25 TABLET, FILM COATED ORAL at 09:00

## 2018-03-26 RX ADMIN — PANTOPRAZOLE SCH MG: 40 TABLET, DELAYED RELEASE ORAL at 12:37

## 2018-03-26 RX ADMIN — METOPROLOL TARTRATE SCH MG: 25 TABLET, FILM COATED ORAL at 20:58

## 2018-03-26 RX ADMIN — DULOXETINE SCH MG: 60 CAPSULE, DELAYED RELEASE ORAL at 10:16

## 2018-03-26 RX ADMIN — OXYCODONE HYDROCHLORIDE AND ACETAMINOPHEN PRN TAB: 10; 325 TABLET ORAL at 10:17

## 2018-03-26 RX ADMIN — DULOXETINE SCH MG: 60 CAPSULE, DELAYED RELEASE ORAL at 21:00

## 2018-03-26 RX ADMIN — ASPIRIN SCH MG: 325 TABLET ORAL at 10:16

## 2018-03-26 RX ADMIN — OXYCODONE HYDROCHLORIDE AND ACETAMINOPHEN PRN TAB: 10; 325 TABLET ORAL at 23:17

## 2018-03-26 RX ADMIN — ESCITALOPRAM OXALATE SCH MG: 20 TABLET ORAL at 10:16

## 2018-03-26 RX ADMIN — OXYCODONE HYDROCHLORIDE AND ACETAMINOPHEN PRN TAB: 10; 325 TABLET ORAL at 03:39

## 2018-03-26 RX ADMIN — NITROGLYCERIN SCH INCH: 20 OINTMENT TOPICAL at 00:00

## 2018-03-26 RX ADMIN — STANDARDIZED SENNA CONCENTRATE AND DOCUSATE SODIUM SCH TAB: 8.6; 5 TABLET, FILM COATED ORAL at 10:16

## 2018-03-26 RX ADMIN — LISINOPRIL SCH MG: 20 TABLET ORAL at 09:00

## 2018-03-26 NOTE — CATHPROC
S B E HIS Report

Study Information

Study Number    Admission           Scheduled Start             Study Start

 

[id number]    Mar 23 2018 7:59PM      03/25/2018                Mar 26 2018 3:43PM

 

Universal Service

 

Cardiac Catheterization

 

Admit Source               Facility Department

 

Emergency department           [facility] - Cath Lab

 

Physician and Clinical Staff

Initial Freddie Murillo         Circulator     Alex Esteves,RN

 

                         Circulator     Alex Iyer,RN

 

                         Other        cathlab, cathlab

 

                         Recorder      Kelsey Dominguez,RRT TECH2

 

                         Scrub        Lorena Sams,RT(R)

 

Procedures Performed

Procedure                     Location (Site)            Vessel Name

 

Coronary Angiograms                 LCA                 Left Coronary

Coronary Angiograms                 RCA                 Right Coronary

L Heart Cath

Wire insertion                  Radial (right)             Radial Art.

Equipment

Time            Description          Size      Mfg Part Number  Used/Scraped

                   TRANSDUCER, TRUWAVE              [id number]

15:47    [employer]                     *                 Used

                   W/STOCKCOCK                  *[id number]

                   WIRE, HYDROSTEER 150CM             [id number]

16:19    [employer]                  150CM               Used

                   ANGLED GLIDE                  *[id number]

                                           [id number]

15:47    [employer]    PACK, CCL CUSTOM        *                 Used

                                           *[id number]

15:47    [employer]    SUPPORT, ARTERIAL ADULT            [id number] *[id number] Used

                                           [id number]

16:23    [employer]        JL 3.5 DXTERITY CATHETER    FR 5               Used

                                           *[id number]

                                           [id number]

16:07    [employer]        JR 4.0 DXTERITY CATHETER    FR 5               Used

                                           *[id number]

                   BAND, RADIAL COMPRESSION TR          [id number]

16:30    [employer]                      29CM               Used

                   LARGE 29                    *[id number]

                                           [id number]

15:47    [employer]      WIRE, EXCHANGE 260CM 3MMJ   260CM               Used

                                           *[id number]

                                           [id number]

15:47    [employer]          MANIFOLD, 4 PORT        *                 Used

                                           *[id number]

15:47    [employer]         OMNIPAQUE, 350 MG, 150ML    150ML      [id number]      Used

                                           [id number]

15:47    [employer]      BLANKET,WARM AIR CCL      *                 Used

                                           *[id number]

                   SHEATH, FR6 TRANSRADIAL            [id number]

15:47    [employer]                     FR 6               Used

                   SLENDER 10CM                  *[id number]

 

Equipment Model, Serial, Lot Number and Expiration Data

Description              Model Number      Serial Number   Lot Number       Expiration Date

 

JR 4.0 DXTERITY CATHETER                            [id number]        10-

WIRE, HYDROSTEER 150CM

                                        [id number]         11-

ANGLED GLIDE

 

History: Current Medications

Medication         Dosage/Unit       Route     Frequency  Last Date/Time Taken

 

Xanax

 

Viagra

 

Celebrex

 

Statins (any)

 

LEXAPRO

 

ASA

 

 

History: Allergies

Allergy              Reaction

 

Morphine              swelling

 

phenobarbital           unknown

History: Risk Factors

                      Family History of

Hypertension   Dyslipidemia                    Previous MI     Previous Heart Failure

                      Premature CAD

Yes        No            No            No         No

Prior Valve

         Prior PCI        Prior CABG

Surgery

No        No            No

         Cerebrovascular     Peripheral Artery     Chronic Lung

On Dialysis                                       Diabetes

         Disease         Disease          Disease

No        No            No            No         No

 

 

History: Risk Factors Selection Items

Current Smoker

 

 

History: Stress Tests

Stress or Imaging Studies Performed

 

No

 

 

History: Other

Current Smoker     Method

 

Yes          Cigarettes

 

Labs

Hgb (g/dl)       Hct (%)         RBC (MIL/MM3)       WBC (l/cumm)       Platelets (thousands)

 

11.60-17.00      35.00-51.00       4.00-5.90         4.00-11.00        150..00

 

14.9          43.3           4.7            13            322

 

Glucose (mg/dl)    BUN (mg/dl)       Creatinine (mg/dl)    BUN:Creatinine (1:x)

74..00      7.00-18.00        0.50-1.30         10.00-20.00

 

90           29            1.1            26.4

 

Na (meq/l)       K (meq/l)        Cl (meq/l)        CO2 (mmol/L)       Ca (mg/dl)

 

136..00     3.50-5.10        98..00       21.00-32.00        8.50-10.10

 

131          4.2           98            24.4           8.7

 

 

 

 

Medication

Medication Total Dose (Bolus/Oral)

Medication             Total Dosage/Unit

 

1% XYLOCAINE               10 mL

 

FENTANYL                 25 mcg

 

RADIAL COCKTAIL              5 mL (Bolus)

 

VERSED                  1 mg

Medications (Bolus/Oral)

Medication           Time Given          Dosage/Unit       Administered By      Reason

 

VERSED             3/26/2018 4:12:52 PM      1 mg         Alex Iyer

1 mg VERSED given in lab by Alex Iyer RN in Right Antecubital via Peripheral IV. Ordered by Freddie Tong

 

FENTANYL            3/26/2018 4:13:00 PM     25 mcg         Alex Iyer

25 mcg FENTANYL given in lab by Alex Iyer RN in Right Antecubital via Peripheral IV. Ordered by 
Freddie Lara

 

1% XYLOCAINE          3/26/2018 4:13:17 PM     10 mL         Freddie Lara

10 mL 1% XYLOCAINE given in lab by Freddie Lara in Right Radial via Subcutaneous. Ordered by Freddie Antonio

 

RADIAL COCKTAIL         3/26/2018 4:16:48 PM      5 mL (Bolus)     Freddie Lara

5 mL (Bolus) RADIAL COCKTAIL given in lab by Freddie Lara via Radial. Using [Solution Name]. O
rdered by Freddie Lara. 2.5

verapamil,3300 heparin

 

Medication (Drip)

Medication           Time Given          Dosage/Unit      Concentration/Unit  Diluent (ml)   Solution


 

IV Solutions          3/26/2018 3:43:15 PM      0 mL (IV)                 500       NaCl .9

IV Solutions given in lab by Alex Iyer RN in Right Antecubital via Peripheral IV. Pump/Drip Flow
 = 20 ml/hr using NaCl .9. Ordered by Freddie Lara.

 

Initial Case Assessment

Cardiovascular

HR             NIBP

 

69             117/76

 

Edema Present        Skin color             Skin

 

None            Normal               Warm Dry

 

Circulatory - Right Pulses

Dorsalis Pedis       Femoral

 

2              2

 

Scale (0,1,2,3,4,d)

 

Circulatory - Left Pulses

Dorsalis Pedis       Femoral

 

2              2

 

Scale (0,1,2,3,4,d)

 

Neurological State

              Oriented to time-place-

Alert                        Moves all extremities

              person

 

Respiration - General

Respiration Rate

              SpO2 (%)

(B/min)

12             95

Final Case Assessment

Cardiovascular

HR           NIBP

 

65           130/71

 

Edema Present      Skin color           Skin

 

None           Normal             Warm Dry

 

Circulatory - Right Pulses

Dorsalis Pedis     Femoral

 

2            2

 

Scale (0,1,2,3,4,d)

 

Circulatory - Left Pulses

Dorsalis Pedis     Femoral

 

2            2

 

Scale (0,1,2,3,4,d)

 

Neurological State

            Oriented to time-place-

Alert                      Moves all extremities

            person

 

Respiration - General

Respiration Rate

            SpO2 (%)

(B/min)

12           94

 

Chronological Log

Time    Study Chronological Log

 

15:43:00  Patient arrived via Bed.

 

15:43:01  Patient Name, D.O.B, / Armband Verified By R.N.

 

15:43:02  Consent signed by the physician and the patient and verified by the Cath Lab staff.

 

15:43:03  Pre-op and post- op instructions given; patient acknowledges understanding of instructions.


 

15:43:06  Patient has been NPO for More than 6Hrs.

 

15:43:07  Skin Breakdown-

 

15:43:09  Patient Warmer Placed on the Table.

 

15:43:11  Wesly Prominences Protected

 

15:43:14  A # 18 IV was noted in the Antecubital (right). Grade = 0

      IV Solutions given in lab by Alex Iyer, RN in Right Antecubital via Peripheral IV. Pump/Dri
p Flow = 20 ml/hr using

15:43:15

      NaCl .9. Ordered by Freddie Lara

15:43:17  History and physical on the chart or being dictated.

      Vitals capture started with the following parameters, Patient=Adult, Interval=5 min, Initial Pr
jppbsr=779 mmHg,

15:48:22

      Deflation Rate=5 mmHg, Cuff placed on Right Arm

15:49:46  HR=69 bpm, YIEP=925/76 mmhg, SpO2=95.0 %, Resp=12 B/min, Pain=0, Joo=10, Javier=2

 

15:50:15  Reference ECG taken

      Assessment: Initial Case, HR=69 BPM, ITKQ=802/76 mmhg, Edema=None, Color=Normal, Skin = Warm, D
ry

      Right Pulses: Maxi Ped=2, Femoral=2

15:51:12  Left Pulses: Maxi Ped=2, Femoral=2

      Neurological: State=Alert, Ox3, PATINO

      Respiration: Resp=12 B/min, SpO2=95 %

15:52:06  Allens test performed on the right radial and ulnar artery.

 

15:54:00  HR=65 bpm, AHDF=327/81 mmhg, SpO2=95.0 %, Resp=11 B/min, Pain=0, Joo=10, Javier=2

 

15:59:03  HR=67 bpm, IYTU=004/70 mmhg, SpO2=97.0 %, Resp=12 B/min, Pain=0, Joo=10, Javier=2

 

16:03:52  Pressure channel 1 zeroed.

 

16:04:04  HR=68 bpm, JWLI=522/77 mmhg, SpO2=95.0 %, Resp=12 B/min, Pain=0, Joo=10, Javier=2

 

16:09:03  HR=67 bpm, IVHD=421/75 mmhg, SpO2=94.0 %, Resp=15 B/min, Pain=0, Joo=10, Javier=2

      Time Out. Correct patient, correct procedure, correct physician, power injector not loaded with
 contrast with surgical

16:11:18

      team present. Time Out Concurred by MD and individual staff in procedure.

16:11:38  Case Start

 

16:12:52  1 mg VERSED given in lab by Alex Iyer, RN in Right Antecubital via Peripheral IV. Order
ed by Freddie Lara.

      25 mcg FENTANYL given in lab by Alex Iyer RN in Right Antecubital via Peripheral IV. Order
ed by Freddie Lara

16:13:00

      G.

      10 mL 1% XYLOCAINE given in lab by Freddie Lara in Right Radial via Subcutaneous. Ordere
d by Marco,

16:13:17

      Freddie LAU.

16:14:04  HR=64 bpm, XLAM=573/80 mmhg, SpO2=95.0 %, Resp=16 B/min, Pain=0, Joo=10, Javier=2

 

16:16:20  Access site was Radial Artery.

      A SHEATH, FR6 TRANSRADIAL SLENDER 10CM FR 6 was advanced into the Radial (right) using the Lai
fied Seldinger

16:16:27

      technique.

      5 mL (Bolus) RADIAL COCKTAIL given in lab by Freddie Lara via Radial. Using [Solution Na
me]. Ordered by

16:16:48

      Freddie Lara. 2.5 verapamil,3300 heparin

      A JR 4.0 DXTERITY CATHETER FR 5 was advanced over a wire. OMNIPAQUE, 350 MG, 150ML 150ML was us
ed for

16:18:13

      injections.

16:19:05  HR=80 bpm, EATP=150/81 mmhg, SpO2=96.0 %, Resp=12 B/min, Pain=0, Joo=10, Javier=2

 

16:19:26  The previous wire was exchanged for a WIRE, HYDROSTEER 150CM ANGLED GLIDE 150CM.

      Recorded Pressure: Ao, HR=70, Condition=Condition 1

16:21:29

      (Aorta) Ao 102/69/84

16:21:42  The  RCA was injected and visualized at various angles. OMNIPAQUE, 350 MG, 150ML 150ML used
.

      Recorded Pressure: LV, HR=66, Condition=Condition 1

16:23:42

      (Left Ventricle) /4/9

      Recorded Pressure: LV, Ao, HR=61, Condition=Condition 1

16:24:03  (Left Ventricle) /2/12,

      (Aorta) Ao 107/56/85

16:24:04  HR=61 bpm, DHEN=274/70 mmhg, SpO2=94.0 %, Resp=16 B/min, Pain=0, Joo=10, Javier=2

      After removing the current catheter a JL 3.5 DXTERITY CATHETER FR 5 was advanced over a WIRE, E
XCHANGE 260CM

16:25:30

      3MMJ 260CM.

16:26:27  The  LCA was injected and visualized at various angles. OMNIPAQUE, 350 MG, 150ML 150ML used
.

 

16:28:41  A WIRE, EXCHANGE 260CM 3MMJ 260CM was inserted via Radial (right).

 

16:28:52  Catheter was removed

 

16:29:05  HR=65 bpm, FCME=239/71 mmhg, SpO2=94.0 %, Resp=12 B/min, Pain=0, Joo=10, Javier=2

 

16:29:18  Case End

 

16:30:03  Catheter(s) removed without difficulty

      Radial Compression Device Used. 13 mLs of air placed in BAND, RADIAL COMPRESSION TR LARGE 29 29
CM. Affected

16:30:05

      hand 95 % O2 saturation.

16:30:25  No case complications noted.

 

16:30:27  Cine recording checked.

 

16:30:30  Bedside Report will be given.

 

16:33:46  Vitals capture stopped.

       Assessment: Final Case, HR=65 BPM, RYJP=339/71 mmhg, Edema=None, Color=Normal, Skin = Warm, Dr
y

       Right Pulses: Maxi Ped=2, Femoral=2

16:33:52   Left Pulses: Maxi Ped=2, Femoral=2

       Neurological: State=Alert, Ox3, PATINO

       Respiration: Resp=12 B/min, SpO2=94 %

16:35:24   A Left Heart Cath was performed.

 

16:35:27   Patient moved to [facility]

 

16:35:37   Clinical correlaton risk stratification.

 

 

 

 

End Study - Contrast Media Used In Study

Contrast        Total Opened (mL)     Total Used (mL)  Total Wasted (mL)

 

Omnipaque       55             55        0

 

End Study - Maximum Contrast Load

Max Contrast Load (mL)

 

371.9

 

End Study - Radiation Exposure

Fluoro Time

(minutes)

2.8

 

 

End Study - Patient Disposition

Complications      Transferred To

 

           Telemetry Bed

## 2018-03-26 NOTE — HHI.PR
Subjective


Remarks


Follow up for chest pain. The patient reports continued intermittent vague 

complaints of chest pain overnight. He does believe it has improved compared to 

yesterday. Denies palpitations or shortness of breath.  He states his anxiety 

is much better controlled after restarting his xanax. He also reports an 

episode of BRBPR last night. He reports some associated epigastric discomfort 

which is new for him. He has some nausea but no vomiting. He believes he last 

had a colonoscopy 2 years ago that was unremarkable however also had poor prep 

and was told they did not get adequate visualization. No repeat colonoscopy was 

done. He has no idea where this may have been done at.





Objective


Vitals





Vital Signs








  Date Time  Temp Pulse Resp B/P (MAP) Pulse Ox O2 Delivery O2 Flow Rate FiO2


 


3/26/18 08:38 98.9 60 18 89/57 (68) 98   





    88/58 (68)    





    98/60 (73)    


 


3/26/18 04:21  78      


 


3/26/18 03:29 96.5 68 17 126/75 (92) 98   





    107/76 (86)    





    108/69 (82)    


 


3/26/18 00:30  68      


 


3/26/18 00:08 95.5 64 17 97/62 (74) 100   


 


3/25/18 20:30  75      


 


3/25/18 19:42 96.6 71 17 126/63 (84) 99   


 


3/25/18 16:33 98.7 54 18 115/64 (81) 95   





    118/66 (83)    





    117/70 (86)    


 


3/25/18 14:45        21


 


3/25/18 12:00 97.6 72 18 112/62 (79) 97   














I/O      


 


 3/25/18 3/25/18 3/25/18 3/26/18 3/26/18 3/26/18





 07:00 15:00 23:00 07:00 15:00 23:00


 


Intake Total 340 ml  300 ml   


 


Balance 340 ml  300 ml   


 


      


 


Intake Oral 340 ml  300 ml   


 


# Voids 3  2 3  


 


# Bowel Movements    4  








Result Diagram:  


3/26/18 0403                                                                   

             3/26/18 0304





Imaging





Last Impressions








Carotid Artery Ultrasound 3/24/18 0000 Signed





Impressions: 





 Service Date/Time:  Saturday, March 24, 2018 16:20 - CONCLUSION: Negative for 





 hemodynamically significant stenosis   Pranav Gunter MD  FACR








Objective Remarks


GENERAL: Well-nourished, well-developed middle aged male patient in [facility].


SKIN: Warm and dry. No rash.


HEENT:  Normocephalic. Atraumatic.Pupils equal and round.   Mucous membranes 

pink and moist.


CARDIOVASCULAR: Regular rate and rhythm.  S1, S2 noted. No murmur appreciated. 

No chest wall tenderness to palpation. 


RESPIRATORY: No accessory muscle use. Clear to auscultation. Breath sounds 

equal bilaterally.  


GASTROINTESTINAL: Abdomen soft, non-tender, nondistended. Normoactive bowel 

sounds x4.


MUSCULOSKELETAL: No obvious deformities. Extremities without clubbing, cyanosis

, or edema. 


NEUROLOGICAL: Awake and alert. No obvious cranial nerve deficits.  Motor 

grossly within normal limits. Normal speech.


PSYCHIATRIC: Anxious mood, improved today; insight and judgment normal.


Medications and IVs





Current Medications








 Medications


  (Trade)  Dose


 Ordered  Sig/Lashawn


 Route  Start Time


 Stop Time Status Last Admin


 


  (NS Flush)  2 ml  UNSCH  PRN


 IV FLUSH  3/23/18 23:45


    3/25/18 20:48


 


 


  (NS Flush)  2 ml  UNSCH  PRN


 IV FLUSH  3/23/18 23:45


     


 


 


  (Tylenol)  500 mg  Q4H  PRN


 PO  3/23/18 23:45


     


 


 


  (Zofran Inj)  4 mg  Q6H  PRN


 IV PUSH  3/23/18 23:45


     


 


 


  (Xanax)  2 mg  HS


 PO  3/23/18 23:45


    3/25/18 20:37


 


 


  (Percocet 


 Mg)  1 tab  Q6H  PRN


 PO  3/23/18 23:45


    3/26/18 10:17


 


 


  (Nitrostat Sl)  0.4 mg  Q5M  PRN


 SL  3/24/18 07:30


     


 


 


  (Aspirin)  325 mg  DAILY


 PO  3/24/18 09:00


    3/26/18 10:16


 


 


  (CeleBREX)  200 mg  DAILY


 PO  3/24/18 09:00


   Future Hold 3/25/18 09:35


 


 


  (Cymbalta Dr)  60 mg  BID


 PO  3/24/18 09:00


    3/26/18 10:16


 


 


  (Prinivil)  20 mg  DAILY


 PO  3/24/18 09:00


    3/25/18 09:33


 


 


  (Microzide)  12.5 mg  DAILY


 PO  3/24/18 09:00


   Future Hold 3/25/18 09:34


 


 


  (Nitroglycerin


 2% Oint)  1 inch  Q6H


 TOPICAL  3/24/18 18:00


    3/24/18 20:10


 


 


  (Imdur)  30 mg  DAILY@07


 PO  3/25/18 11:30


    3/26/18 06:19


 


 


  (Lopressor)  12.5 mg  Q12HR


 PO  3/25/18 11:30


    3/25/18 20:45


 


 


  (Pill Splitter)  1 ea  UNSCH  PRN


 OTHER  3/25/18 11:30


     


 


 


  (Xanax)  1 mg  Q6H  PRN


 PO  3/25/18 14:45


    3/26/18 05:52


 


 


  (Lexapro)  20 mg  DAILY


 PO  3/25/18 15:15


    3/26/18 10:16


 


 


  (Summer-Colace)  1 tab  BID


 PO  3/25/18 23:00


    3/26/18 10:16


 


 


  (Milk Of


 Magnesia Liq)  30 ml  Q12H  PRN


 PO  3/25/18 23:00


     


 


 


  (Senokot)  17.2 mg  Q12H  PRN


 PO  3/25/18 23:00


     


 


 


  (Dulcolax Supp)  10 mg  DAILY  PRN


 RECTAL  3/25/18 23:00


     


 


 


 Lactated Ringer's  1,000 ml @ 


 30 mls/hr  Q24H PRN


 IV  3/26/18 03:30


 3/29/18 03:29   


 


 


 Sodium Chloride  500 ml @ 


 30 mls/hr  [id number] PRN


 IV  3/26/18 03:30


 3/29/18 03:29   


 


 


  (Betadine 5%


 Antisepsis Kit)  1 applic  ON CALL  PRN


 EACH NARE  3/26/18 03:30


 3/29/18 03:29   


 


 


  (Chlorhexidine


 2% Cloth)  3 pack  ON CALL  PRN


 TOPICAL  3/26/18 03:30


 3/29/18 03:29   


 











A/P


Assessment and Plan


57-year-old male with history of anxiety, PTSD, bipolar disorder, mild aortic 

stenosis, hypertension, presents with intermittent chest pain.  Initially 

admitted to [facility], then transferred to [facility] chest pain center, 

however patient had syncopal episode while in the hospital, found with brief 

questionable episode of unresponsiveness, therefore admitted to hospitalist for 

further evaluation. 





Chest pain: atypical. 


   -EMR Reviewed, Nuclear Stress Test 11/13/17 showed no reversible perfusion 

defect to indicate stress-induced ischemia.  


   -ACS ruled out on 3/23 at [facility] with negative serial cardiac enzymes x3 

and EKG without acute ischemic changes


   -CT-PA on 3/23 also negative, no pulmonary embolism


   -Echocardiogram 3/24 with EF 60-65%, mild AS, mild-mod AR, trace TR


   -Continue aspirin, imdur, ACE, BB, nitro ointment


   -Consulted cardiology


   -Discussed with Dr. Lara, patient with ongoing chest pain, planning for 

cardiac catheterization today





GI Bleeding with BRBPR: reported during hospitalization on 3/26. One episode of 

BRBPR.  Patient reports hx of colonoscopy that was unremarkable however had 

poor prep.


   -Hgb stable at 14.9, will continue to monitor


   -start on PPI


   -Give IVF hydration


   -check stool hemoccult


   -Consult gastroenterology





Syncope: report from chest pain center and nursing staff reports questionable 

episode of syncope after patient was discharged, not witnessed by staff


   -echocardiogram unremarkable as above, mild AS unlikely cause of syncope


   -ruling out ACS as above


   -carotid U/S with no stenosis


   -orthostatics negative (BP increases appropriately upon standing)


   -no further syncopal events





Mild Aortic Stenosis: unchanged


   -unlikely etiology of patient's symptoms


   -continue outpatient f/up with cardiology





Hypertension: BP soft


   -continued patient's home meds including lisinopril, hold HCTZ


   -also started on metoprolol and imdur for ongoing chest pain


   -may need to discontinue HCTZ at discharge and decrease dose of lisinopril 

if BP continues to be low





Anxiety/PTSD/Bipolar Disorder: patient with severe anxiety on exam


   -continue patient's Lexapro, Xanax prn, Cymbalta, Celebrex


   -strongly encouraged outpatient f/up with PCP and psychiatrist upon discharge





DVT Prophylaxis: teds/SCDs


Discharge Planning


Going for heart catheterization today. Now with new BRBPR, awaiting GI consult.











Yaz Millan PA-C Mar 26, 2018 9:36 am

## 2018-03-26 NOTE — PD.CARD.PN
Subjective


Subjective Remarks


Bright red blood per rectum last night





Post-cath today





Objective


Medications





Current Medications








 Medications


  (Trade)  Dose


 Ordered  Sig/Lashawn


 Route  Start Time


 Stop Time Status Last Admin


 


  (NS Flush)  2 ml  UNSCH  PRN


 IV FLUSH  3/23/18 23:45


    3/25/18 20:48


 


 


  (NS Flush)  2 ml  UNSCH  PRN


 IV FLUSH  3/23/18 23:45


     


 


 


  (Tylenol)  500 mg  Q4H  PRN


 PO  3/23/18 23:45


     


 


 


  (Zofran Inj)  4 mg  Q6H  PRN


 IV PUSH  3/23/18 23:45


    3/26/18 17:37


 


 


  (Xanax)  2 mg  HS


 PO  3/23/18 23:45


    3/25/18 20:37


 


 


  (Percocet 


 Mg)  1 tab  Q6H  PRN


 PO  3/23/18 23:45


    3/26/18 17:00


 


 


  (Nitrostat Sl)  0.4 mg  Q5M  PRN


 SL  3/24/18 07:30


     


 


 


  (Aspirin)  325 mg  DAILY


 PO  3/24/18 09:00


    3/26/18 10:16


 


 


  (CeleBREX)  200 mg  DAILY


 PO  3/24/18 09:00


   Future Hold 3/25/18 09:35


 


 


  (Cymbalta Dr)  60 mg  BID


 PO  3/24/18 09:00


    3/26/18 10:16


 


 


  (Prinivil)  20 mg  DAILY


 PO  3/24/18 09:00


    3/25/18 09:33


 


 


  (Microzide)  12.5 mg  DAILY


 PO  3/24/18 09:00


   Future Hold 3/25/18 09:34


 


 


  (Nitroglycerin


 2% Oint)  1 inch  Q6H


 TOPICAL  3/24/18 18:00


    3/24/18 20:10


 


 


  (Imdur)  30 mg  DAILY@07


 PO  3/25/18 11:30


    3/26/18 06:19


 


 


  (Lopressor)  12.5 mg  Q12HR


 PO  3/25/18 11:30


    3/25/18 20:45


 


 


  (Pill Splitter)  1 ea  UNSCH  PRN


 OTHER  3/25/18 11:30


     


 


 


  (Xanax)  1 mg  Q6H  PRN


 PO  3/25/18 14:45


    3/26/18 12:37


 


 


  (Lexapro)  20 mg  DAILY


 PO  3/25/18 15:15


    3/26/18 10:16


 


 


  (Summer-Colace)  1 tab  BID


 PO  3/25/18 23:00


    3/26/18 10:16


 


 


  (Milk Of


 Magnesia Liq)  30 ml  Q12H  PRN


 PO  3/25/18 23:00


     


 


 


  (Senokot)  17.2 mg  Q12H  PRN


 PO  3/25/18 23:00


     


 


 


  (Dulcolax Supp)  10 mg  DAILY  PRN


 RECTAL  3/25/18 23:00


     


 


 


 Lactated Ringer's  1,000 ml @ 


 30 mls/hr  Q24H PRN


 IV  3/26/18 03:30


 3/29/18 03:29   


 


 


 Sodium Chloride  500 ml @ 


 30 mls/hr  [id number] PRN


 IV  3/26/18 03:30


 3/29/18 03:29   


 


 


  (Betadine 5%


 Antisepsis Kit)  1 applic  ON CALL  PRN


 EACH NARE  3/26/18 03:30


 3/29/18 03:29   


 


 


  (Chlorhexidine


 2% Cloth)  3 pack  ON CALL  PRN


 TOPICAL  3/26/18 03:30


 3/29/18 03:29   


 


 


  (Protonix)  40 mg  DAILY


 PO  3/26/18 12:15


    3/26/18 12:37


 


 


 Sodium Chloride  1,000 ml @ 


 84 mls/hr  [id number]


 IV  3/26/18 12:15


    3/26/18 13:15


 


 


  (Colyte Liq)  4,000 ml  ONCE  ONCE


 PO  3/27/18 16:00


 3/27/18 16:01   


 








Vital Signs / I&O





Vital Signs








  Date Time  Temp Pulse Resp B/P (MAP) Pulse Ox O2 Delivery O2 Flow Rate FiO2


 


3/26/18 16:53     98 Room Air  


 


3/26/18 12:06 97.8 76 18 119/57 (77) 96   


 


3/26/18 08:38 98.9 60 18 89/57 (68) 98   





    88/58 (68)    





    98/60 (73)    


 


3/26/18 04:21  78      


 


3/26/18 03:29 96.5 68 17 126/75 (92) 98   





    107/76 (86)    





    108/69 (82)    


 


3/26/18 00:30  68      


 


3/26/18 00:08 95.5 64 17 97/62 (74) 100   


 


3/25/18 20:30  75      


 


3/25/18 19:42 96.6 71 17 126/63 (84 99   














I/O      


 


 3/25/18 3/25/18 3/25/18 3/26/18 3/26/18 3/26/18





 07:00 15:00 23:00 07:00 15:00 23:00


 


Intake Total 340 ml  300 ml   


 


Balance 340 ml  300 ml   


 


      


 


Intake Oral 340 ml  300 ml   


 


# Voids 3  2 3  


 


# Bowel Movements    4  








Physical Exam


GENERAL: NAD, AAOx3


SKIN: Warm and dry.


HEAD: Atraumatic. Normocephalic. 


EYES: Pupils equal and round. No scleral icterus. No injection or drainage. 


ENT: No nasal bleeding or discharge.  Mucous membranes pink and moist.


NECK: Trachea midline. No JVD. 


CARDIOVASCULAR: Regular rate and rhythm.  


RESPIRATORY: No accessory muscle use. Clear to auscultation. Breath sounds 

equal bilaterally. 


GASTROINTESTINAL: Abdomen soft, non-tender, nondistended. Hepatic and splenic 

margins not palpable. 


MUSCULOSKELETAL: Extremities without clubbing, cyanosis, or edema. No obvious 

deformities. 


NEUROLOGICAL: Awake and alert. No obvious cranial nerve deficits.  Motor 

grossly within normal limits. Five out of 5 muscle strength in the arms and 

legs.  Normal speech.


PSYCHIATRIC: Appropriate mood and affect; insight and judgment normal.


Laboratory





Laboratory Tests








Test


  3/25/18


20:46 3/26/18


03:04 3/26/18


04:03


 


Blood Urea Nitrogen  29 MG/DL  


 


Creatinine  1.16 MG/DL  


 


Random Glucose  90 MG/DL  


 


Total Protein  7.1 GM/DL  


 


Albumin  3.3 GM/DL  


 


Calcium Level  8.7 MG/DL  


 


Alkaline Phosphatase  102 U/L  


 


Aspartate Amino Transf


(AST/SGOT) 


  19 U/L 


  


 


 


Alanine Aminotransferase


(ALT/SGPT) 


  26 U/L 


  


 


 


Total Bilirubin  0.6 MG/DL  


 


Sodium Level  131 MEQ/L  


 


Potassium Level  4.2 MEQ/L  


 


Chloride Level  98 MEQ/L  


 


Carbon Dioxide Level  24.4 MEQ/L  


 


Anion Gap  9 MEQ/L  


 


Estimat Glomerular Filtration


Rate 


  64 ML/MIN 


  


 


 


Triglycerides Level  278 MG/DL  


 


Cholesterol Level  180 MG/DL  


 


LDL Cholesterol  74 MG/DL  


 


HDL Cholesterol  50.8 MG/DL  


 


Cholesterol/HDL Ratio  3.54 RATIO  


 


White Blood Count   13.0 TH/MM3 


 


Red Blood Count   4.78 MIL/MM3 


 


Hemoglobin   14.9 GM/DL 


 


Hematocrit   43.3 % 


 


Mean Corpuscular Volume   90.5 FL 


 


Mean Corpuscular Hemoglobin   31.1 PG 


 


Mean Corpuscular Hemoglobin


Concent 


  


  34.3 % 


 


 


Red Cell Distribution Width   15.5 % 


 


Platelet Count   322 TH/MM3 


 


Mean Platelet Volume   7.8 FL 


 


Neutrophils (%) (Auto)   75.5 % 


 


Lymphocytes (%) (Auto)   14.9 % 


 


Monocytes (%) (Auto)   8.3 % 


 


Eosinophils (%) (Auto)   1.0 % 


 


Basophils (%) (Auto)   0.3 % 


 


Neutrophils # (Auto)   9.8 TH/MM3 


 


Lymphocytes # (Auto)   1.9 TH/MM3 


 


Monocytes # (Auto)   1.1 TH/MM3 


 


Eosinophils # (Auto)   0.1 TH/MM3 


 


Basophils # (Auto)   0.0 TH/MM3 


 


CBC Comment   DIFF FINAL 


 


Differential Comment    











Assessment and Plan


Problem List:  


(1) Chest pain


ICD Codes:  R07.9 - Chest pain, unspecified


Status:  Acute


(2) Chronic pain


ICD Codes:  G89.29 - Other chronic pain


Status:  Acute


(3) Aortic stenosis


ICD Codes:  I35.0 - Aortic stenosis


Status:  Chronic


Assessment and Plan


1) Catheterization with normal coronaries


2) Mild AS on echo


3) Micturition syncope


4) May proceed with Cscope per GI





Problem Qualifiers





(1) Aortic stenosis:  


Qualified Codes:  I35.0 - Nonrheumatic aortic (valve) stenosis








Freddie Lara DO Mar 26, 2018 17:54
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
## 2018-03-27 VITALS
TEMPERATURE: 97.5 F | DIASTOLIC BLOOD PRESSURE: 65 MMHG | RESPIRATION RATE: 16 BRPM | OXYGEN SATURATION: 99 % | HEART RATE: 69 BPM | SYSTOLIC BLOOD PRESSURE: 113 MMHG

## 2018-03-27 VITALS
RESPIRATION RATE: 16 BRPM | OXYGEN SATURATION: 97 % | SYSTOLIC BLOOD PRESSURE: 116 MMHG | DIASTOLIC BLOOD PRESSURE: 61 MMHG | TEMPERATURE: 98 F | HEART RATE: 69 BPM

## 2018-03-27 VITALS
TEMPERATURE: 97.9 F | SYSTOLIC BLOOD PRESSURE: 144 MMHG | OXYGEN SATURATION: 97 % | DIASTOLIC BLOOD PRESSURE: 67 MMHG | HEART RATE: 59 BPM | RESPIRATION RATE: 17 BRPM

## 2018-03-27 VITALS
OXYGEN SATURATION: 97 % | RESPIRATION RATE: 20 BRPM | HEART RATE: 63 BPM | TEMPERATURE: 98.7 F | SYSTOLIC BLOOD PRESSURE: 135 MMHG | DIASTOLIC BLOOD PRESSURE: 73 MMHG

## 2018-03-27 VITALS
OXYGEN SATURATION: 96 % | TEMPERATURE: 97.7 F | SYSTOLIC BLOOD PRESSURE: 115 MMHG | HEART RATE: 50 BPM | RESPIRATION RATE: 17 BRPM | DIASTOLIC BLOOD PRESSURE: 56 MMHG

## 2018-03-27 VITALS
OXYGEN SATURATION: 95 % | SYSTOLIC BLOOD PRESSURE: 134 MMHG | RESPIRATION RATE: 16 BRPM | HEART RATE: 61 BPM | TEMPERATURE: 97.8 F | DIASTOLIC BLOOD PRESSURE: 60 MMHG

## 2018-03-27 VITALS
TEMPERATURE: 98 F | OXYGEN SATURATION: 95 % | SYSTOLIC BLOOD PRESSURE: 125 MMHG | DIASTOLIC BLOOD PRESSURE: 60 MMHG | RESPIRATION RATE: 18 BRPM | HEART RATE: 73 BPM

## 2018-03-27 VITALS
DIASTOLIC BLOOD PRESSURE: 74 MMHG | RESPIRATION RATE: 18 BRPM | SYSTOLIC BLOOD PRESSURE: 140 MMHG | HEART RATE: 58 BPM | TEMPERATURE: 98.5 F | OXYGEN SATURATION: 96 %

## 2018-03-27 VITALS — HEART RATE: 58 BPM

## 2018-03-27 VITALS — OXYGEN SATURATION: 96 %

## 2018-03-27 LAB
BASOPHILS # BLD AUTO: 0.1 TH/MM3 (ref 0–0.2)
BASOPHILS NFR BLD: 0.8 % (ref 0–2)
BUN SERPL-MCNC: 21 MG/DL (ref 7–18)
CALCIUM SERPL-MCNC: 8.5 MG/DL (ref 8.5–10.1)
CHLORIDE SERPL-SCNC: 103 MEQ/L (ref 98–107)
CREAT SERPL-MCNC: 0.98 MG/DL (ref 0.6–1.3)
EOSINOPHIL # BLD: 0.1 TH/MM3 (ref 0–0.4)
EOSINOPHIL NFR BLD: 1.7 % (ref 0–4)
ERYTHROCYTE [DISTWIDTH] IN BLOOD BY AUTOMATED COUNT: 15.2 % (ref 11.6–17.2)
GFR SERPLBLD BASED ON 1.73 SQ M-ARVRAT: 78 ML/MIN (ref 89–?)
GLUCOSE SERPL-MCNC: 80 MG/DL (ref 74–106)
HCO3 BLD-SCNC: 25.2 MEQ/L (ref 21–32)
HCT VFR BLD CALC: 36.8 % (ref 39–51)
HGB BLD-MCNC: 12.7 GM/DL (ref 13–17)
LYMPHOCYTES # BLD AUTO: 1.5 TH/MM3 (ref 1–4.8)
LYMPHOCYTES NFR BLD AUTO: 19.8 % (ref 9–44)
MCH RBC QN AUTO: 31.3 PG (ref 27–34)
MCHC RBC AUTO-ENTMCNC: 34.5 % (ref 32–36)
MCV RBC AUTO: 90.7 FL (ref 80–100)
MONOCYTE #: 0.7 TH/MM3 (ref 0–0.9)
MONOCYTES NFR BLD: 9.1 % (ref 0–8)
NEUTROPHILS # BLD AUTO: 5.2 TH/MM3 (ref 1.8–7.7)
NEUTROPHILS NFR BLD AUTO: 68.6 % (ref 16–70)
PLATELET # BLD: 276 TH/MM3 (ref 150–450)
PMV BLD AUTO: 7.8 FL (ref 7–11)
RBC # BLD AUTO: 4.05 MIL/MM3 (ref 4.5–5.9)
SODIUM SERPL-SCNC: 137 MEQ/L (ref 136–145)
WBC # BLD AUTO: 7.6 TH/MM3 (ref 4–11)

## 2018-03-27 RX ADMIN — METOPROLOL TARTRATE SCH MG: 25 TABLET, FILM COATED ORAL at 09:00

## 2018-03-27 RX ADMIN — PANTOPRAZOLE SCH MG: 40 TABLET, DELAYED RELEASE ORAL at 09:00

## 2018-03-27 RX ADMIN — NITROGLYCERIN SCH INCH: 20 OINTMENT TOPICAL at 00:00

## 2018-03-27 RX ADMIN — ESCITALOPRAM OXALATE SCH MG: 20 TABLET ORAL at 10:02

## 2018-03-27 RX ADMIN — NITROGLYCERIN SCH INCH: 20 OINTMENT TOPICAL at 11:10

## 2018-03-27 RX ADMIN — STANDARDIZED SENNA CONCENTRATE AND DOCUSATE SODIUM SCH TAB: 8.6; 5 TABLET, FILM COATED ORAL at 21:00

## 2018-03-27 RX ADMIN — OXYCODONE HYDROCHLORIDE AND ACETAMINOPHEN PRN TAB: 10; 325 TABLET ORAL at 11:08

## 2018-03-27 RX ADMIN — PHENYTOIN SODIUM SCH MLS/HR: 50 INJECTION INTRAMUSCULAR; INTRAVENOUS at 00:28

## 2018-03-27 RX ADMIN — ONDANSETRON PRN MG: 2 INJECTION, SOLUTION INTRAMUSCULAR; INTRAVENOUS at 10:02

## 2018-03-27 RX ADMIN — ISOSORBIDE MONONITRATE SCH MG: 30 TABLET, EXTENDED RELEASE ORAL at 05:17

## 2018-03-27 RX ADMIN — OXYCODONE HYDROCHLORIDE AND ACETAMINOPHEN PRN TAB: 10; 325 TABLET ORAL at 05:17

## 2018-03-27 RX ADMIN — OXYCODONE HYDROCHLORIDE AND ACETAMINOPHEN PRN TAB: 10; 325 TABLET ORAL at 23:01

## 2018-03-27 RX ADMIN — ASPIRIN SCH MG: 325 TABLET ORAL at 10:03

## 2018-03-27 RX ADMIN — STANDARDIZED SENNA CONCENTRATE AND DOCUSATE SODIUM SCH TAB: 8.6; 5 TABLET, FILM COATED ORAL at 10:03

## 2018-03-27 RX ADMIN — PHENYTOIN SODIUM SCH MLS/HR: 50 INJECTION INTRAMUSCULAR; INTRAVENOUS at 23:02

## 2018-03-27 RX ADMIN — DULOXETINE SCH MG: 60 CAPSULE, DELAYED RELEASE ORAL at 10:03

## 2018-03-27 RX ADMIN — DULOXETINE SCH MG: 60 CAPSULE, DELAYED RELEASE ORAL at 21:00

## 2018-03-27 RX ADMIN — NITROGLYCERIN SCH INCH: 20 OINTMENT TOPICAL at 04:46

## 2018-03-27 RX ADMIN — LISINOPRIL SCH MG: 20 TABLET ORAL at 10:03

## 2018-03-27 RX ADMIN — NITROGLYCERIN SCH INCH: 20 OINTMENT TOPICAL at 17:07

## 2018-03-27 RX ADMIN — NITROGLYCERIN SCH INCH: 20 OINTMENT TOPICAL at 23:02

## 2018-03-27 RX ADMIN — METOPROLOL TARTRATE SCH MG: 25 TABLET, FILM COATED ORAL at 21:00

## 2018-03-27 RX ADMIN — OXYCODONE HYDROCHLORIDE AND ACETAMINOPHEN PRN TAB: 10; 325 TABLET ORAL at 17:06
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
## 2018-03-27 NOTE — MA
cc:

Freddie Lara DO

****

 

 

DATE:

03/26/2018

 

DATE OF PROCEDURE:

Was 03/26/2018.

 

PROCEDURE:

Left heart catheterization, coronary angiogram, moderate sedation, 17 

minutes.

 

PREPROCEDURE DIAGNOSES:

Continual chest pain with a negative nuclear stress test, syncope.

 

POSTPROCEDURE DIAGNOSIS:

Minimal coronary artery disease.

 

MEDICATIONS:

Versed 0.5 mg, fentanyl 25 micrograms, heparin 3300 units, verapamil 

2.5 mg.

 

Contrast used 55 mL.

 

Fluoroscopy 2.8 minutes.

 

Moderate sedation 17 minutes.

 

ESTIMATED BLOOD LOSS:

Was 10 mL.

 

PROCEDURAL SUMMARY:

Cirilo Bolden is a pleasant 57-year-old male who has presented 

multiple times to [facility] for chest pain.  He has had 

two previous stress tests within the past year that were negative.  He

continues to have chest pain, which has some components of typical 

angina, as well as he had a syncopal episode while trying to use the 

restroom and so he was recommended cardiac catheterization.  Risks, 

benefits and alternatives were explained to him and he consented to 

such.

 

DESCRIPTION OF PROCEDURE:

He was brought to the lab and prepped in the usual sterile fashion.  

The right radial artery was accessed using a modified Seldinger 

technique and placement of a 5/6 Bangladeshi slender sheath.  This was 

easily aspirated and flushed.  A JR4 was advanced to the midforearm 

where it met some resistance.  Angiogram was done and a Glidewire was 

used to advance the JR4 to the ascending aorta.  JR4 was used for 

selective angiography of the right coronary artery system.  JR4 was 

then advanced across the aortic valve for measurement of left 

ventricular pressure.  This was pulled back across the aortic valve 

showing no significant gradient of aortic stenosis.  This was 

exchanged out for a JL3.5, which was used for selective angiography of

the left coronary artery system.  A JL3.5 was removed over a J-wire.  

A radial band was placed over the arteriotomy site for hemostasis.  

The patient left the cath lab cardiovascularly stable.

 

FINDINGS:

Left main:  Overall short vessel with no significant disease.  It 

bifurcates into an LAD and circumflex.

 

LAD:  Normal-sized vessel with mild luminal irregularities in the 

[id number] portion.  It gives off 2 diagonals with no significant 

disease.

 

Left circumflex:  Dominant vessel which is, overall, large in size.  

It gives off 3 obtuse marginals as well as a PDA.  No significant 

disease noted throughout the system.

 

RCA:  Small-to-moderate-sized vessel with a 30% lesion in the 

midportion.  Overall, nondominant vessel.

 

LVEDP 12.

 

IMPRESSIONS:

1.  Mr. Bolden appears to have minimal coronary artery disease.  He 

will be recommended continued medical therapy.

2.  He did have some bright red blood noted in his bowel movement and 

he may undergo colonoscopy later this week.  This was discussed with 

Gastroenterology..

3.  As far as his syncope goes, this is most likely micturition 

syncope, as he has an echocardiogram showing, at most, mild aortic 

stenosis.  He has no significant coronary artery disease.

4.  Further recommendations will be made based on hospital course.

 

Thank you for allowing me to see Cirilo Bolden.  If there are any 

questions, please do not hesitate to call.

 

 

__________________________________

DO CECE Escobar/TUSHAR

D: 03/27/2018, 12:36 AM

T: 03/27/2018, 01:27 AM

Visit #: [id number]

Job #: [id number]

## 2018-03-27 NOTE — HHI.PR
Subjective


Remarks


Patient denies cp/sob





Objective


Vitals





Vital Signs








  Date Time  Temp Pulse Resp B/P (MAP) Pulse Ox O2 Delivery O2 Flow Rate FiO2


 


3/27/18 16:15 97.9 59 17 144/67 (92) 97   


 


3/27/18 12:00  73      


 


3/27/18 12:00 98.0 54 18 125/60 (81) 95   


 


3/27/18 11:21     96   21


 


3/27/18 08:00  58      


 


3/27/18 08:00 98.5 54 18 140/74 (96) 96   


 


3/27/18 08:00     96 Room Air  21


 


3/27/18 04:06  58      


 


3/27/18 04:06      Room Air  


 


3/27/18 04:00 98.0 69 16 116/61 (79) 97   


 


3/27/18 00:00 97.5 69 16 113/65 (81) 99   


 


3/27/18 00:00      Room Air  


 


3/27/18 00:00  69      


 


3/26/18 20:00 98.1 61 17 122/68 (86) 95   


 


3/26/18 20:00  56      


 


3/26/18 20:00      Room Air  














I/O      


 


 3/26/18 3/26/18 3/26/18 3/27/18 3/27/18 3/27/18





 06:59 14:59 22:59 06:59 14:59 22:59


 


Intake Total    960 ml  


 


Output Total    275 ml  


 


Balance    685 ml  


 


      


 


Intake Oral    960 ml  


 


Output Urine Total    275 ml  


 


# Voids 3     


 


# Bowel Movements 4     








Result Diagram:  


3/27/18 0600                                                                   

             3/27/18 0600





Imaging





Last Impressions








Abdomen/Pelvis CT 3/26/18 0000 Signed





Impressions: 





 Service Date/Time:  Tuesday, March 27, 2018 00:12 - CONCLUSION:  1. Few 





 scattered diverticula without diverticulitis. 2. Focal area wall thickening 





 distal descending colon versus nondistention. Nonemergent barium enema versus 





 colonoscopy recommended. 3. Hepatic and renal low densities likely benign.     





 Dre Patton MD 


 


Carotid Artery Ultrasound 3/24/18 0000 Signed





Impressions: 





 Service Date/Time:  Saturday, March 24, 2018 16:20 - CONCLUSION: Negative for 





 hemodynamically significant stenosis   Pranav Gunter MD  FACR








Objective Remarks


GENERAL: Well-nourished, well-developed middle aged male patient in NAD.


SKIN: Warm and dry. No rash.


HEENT:  Normocephalic. Atraumatic.Pupils equal and round.   Mucous membranes 

pink and moist.


CARDIOVASCULAR: Regular rate and rhythm.  S1, S2 noted. No murmur appreciated. 

No chest wall tenderness to palpation. 


RESPIRATORY: No accessory muscle use. Clear to auscultation. Breath sounds 

equal bilaterally.  


GASTROINTESTINAL: Abdomen soft, non-tender, nondistended. Normoactive bowel 

sounds x4.


MUSCULOSKELETAL: No obvious deformities. Extremities without clubbing, cyanosis

, or edema. 


NEUROLOGICAL: Awake and alert. No obvious cranial nerve deficits.  Motor 

grossly within normal limits. Normal speech.


PSYCHIATRIC: Anxious mood, improved today; insight and judgment normal.


Urinary Catheter:  No





A/P


Assessment and Plan


57-year-old male with history of anxiety, PTSD, bipolar disorder, mild aortic 

stenosis, hypertension, presents with intermittent chest pain.  Initially 

admitted to [facility], then transferred to [facility] chest pain center, 

however patient had syncopal episode while in the hospital, found with brief 

questionable episode of unresponsiveness, therefore admitted to hospitalist for 

further evaluation. 





Chest pain: atypical. 


   -EMR Reviewed, Nuclear Stress Test 11/13/17 showed no reversible perfusion 

defect to indicate stress-induced ischemia.  


   -ACS ruled out on 3/23 at [facility] with negative serial cardiac enzymes x3 

and EKG without acute ischemic changes


   -CT-PA on 3/23 also negative, no pulmonary embolism


   -Echocardiogram 3/24 with EF 60-65%, mild AS, mild-mod AR, trace TR


   -Continue aspirin, imdur, ACE, BB, nitro ointment


   -Consulted cardiology


   -Discussed with Dr. Lara, patient with ongoing chest pain, planning for 

cardiac catheterization today


   3/27 SP cardiac catheterization with minimal CAD.  Medical management 

recommended.





GI Bleeding with BRBPR: reported during hospitalization on 3/26. One episode of 

BRBPR.  Patient reports hx of colonoscopy that was unremarkable however had 

poor prep.


   -Hgb stable at 14.9, will continue to monitor


   -start on PPI


   -Give IVF hydration


   -check stool hemoccult


   -3/27 Appreciate GI recommendations. for EGD/colonoscopy in am.





Syncope: report from chest pain center and nursing staff reports questionable 

episode of syncope after patient was discharged, not witnessed by staff


   -echocardiogram unremarkable as above, mild AS unlikely cause of syncope


   -ruling out ACS as above


   -carotid U/S with no stenosis


   -orthostatics negative (BP increases appropriately upon standing)


   -no further syncopal events





Mild Aortic Stenosis: unchanged


   -unlikely etiology of patient's symptoms


   -continue outpatient f/up with cardiology





Hypertension: BP soft


   -continued patient's home meds including lisinopril, hold HCTZ


   -also started on metoprolol and imdur for ongoing chest pain


   -may need to discontinue HCTZ at discharge and decrease dose of lisinopril 

if BP continues to be low





Anxiety/PTSD/Bipolar Disorder: patient with severe anxiety on exam


   -continue patient's Lexapro, Xanax prn, Cymbalta, Celebrex


   -strongly encouraged outpatient f/up with PCP and psychiatrist upon discharge





DVT Prophylaxis: teds/SCDs


Discharge Planning


Pending EGD/colonoscopy in [place] Cevallos MD Mar 27, 2018 18:11

## 2018-03-27 NOTE — PD.CARD.PN
Subjective


Subjective Remarks


Patient was seen this afternoon, late entry note





Did have another bowel movement with blood





Objective


Medications





Current Medications








 Medications


  (Trade)  Dose


 Ordered  Sig/Lashawn


 Route  Start Time


 Stop Time Status Last Admin


 


  (NS Flush)  2 ml  UNSCH  PRN


 IV FLUSH  3/23/18 23:45


    3/25/18 20:48


 


 


  (NS Flush)  2 ml  UNSCH  PRN


 IV FLUSH  3/23/18 23:45


     


 


 


  (Tylenol)  500 mg  Q4H  PRN


 PO  3/23/18 23:45


     


 


 


  (Zofran Inj)  4 mg  Q6H  PRN


 IV PUSH  3/23/18 23:45


    3/27/18 10:02


 


 


  (Xanax)  2 mg  HS


 PO  3/23/18 23:45


    3/27/18 21:34


 


 


  (Percocet 


 Mg)  1 tab  Q6H  PRN


 PO  3/23/18 23:45


    3/27/18 17:06


 


 


  (Nitrostat Sl)  0.4 mg  Q5M  PRN


 SL  3/24/18 07:30


     


 


 


  (Aspirin)  325 mg  DAILY


 PO  3/24/18 09:00


    3/27/18 10:03


 


 


  (CeleBREX)  200 mg  DAILY


 PO  3/24/18 09:00


   Future Hold 3/25/18 09:35


 


 


  (Cymbalta Dr)  60 mg  BID


 PO  3/24/18 09:00


    3/27/18 10:03


 


 


  (Prinivil)  20 mg  DAILY


 PO  3/24/18 09:00


    3/27/18 10:03


 


 


  (Microzide)  12.5 mg  DAILY


 PO  3/24/18 09:00


   Future Hold 3/25/18 09:34


 


 


  (Nitroglycerin


 2% Oint)  1 inch  Q6H


 TOPICAL  3/24/18 18:00


    3/24/18 20:10


 


 


  (Imdur)  30 mg  DAILY@07


 PO  3/25/18 11:30


    3/26/18 06:19


 


 


  (Lopressor)  12.5 mg  Q12HR


 PO  3/25/18 11:30


    3/25/18 20:45


 


 


  (Pill Splitter)  1 ea  UNSCH  PRN


 OTHER  3/25/18 11:30


     


 


 


  (Xanax)  1 mg  Q6H  PRN


 PO  3/25/18 14:45


    3/27/18 17:05


 


 


  (Lexapro)  20 mg  DAILY


 PO  3/25/18 15:15


    3/27/18 10:02


 


 


  (Summer-Colace)  1 tab  BID


 PO  3/25/18 23:00


    3/27/18 10:03


 


 


  (Milk Of


 Magnesia Liq)  30 ml  Q12H  PRN


 PO  3/25/18 23:00


     


 


 


  (Senokot)  17.2 mg  Q12H  PRN


 PO  3/25/18 23:00


     


 


 


  (Dulcolax Supp)  10 mg  DAILY  PRN


 RECTAL  3/25/18 23:00


     


 


 


 Lactated Ringer's  1,000 ml @ 


 30 mls/hr  Q24H PRN


 IV  3/26/18 03:30


 3/29/18 03:29   


 


 


 Sodium Chloride  500 ml @ 


 30 mls/hr  [id number] PRN


 IV  3/26/18 03:30


 3/29/18 03:29   


 


 


  (Betadine 5%


 Antisepsis Kit)  1 applic  ON CALL  PRN


 EACH NARE  3/26/18 03:30


 3/29/18 03:29   


 


 


  (Chlorhexidine


 2% Cloth)  3 pack  ON CALL  PRN


 TOPICAL  3/26/18 03:30


 3/29/18 03:29   


 


 


  (Protonix)  40 mg  DAILY


 PO  3/26/18 12:15


    3/27/18 09:00


 


 


 Sodium Chloride  1,000 ml @ 


 84 mls/hr  [id number]


 IV  3/26/18 12:15


    3/27/18 00:28


 


 


  (Bentyl)  20 mg  TID  PRN


 PO  3/27/18 11:00


    3/27/18 14:16


 








Vital Signs / I&O





Vital Signs








  Date Time  Temp Pulse Resp B/P (MAP) Pulse Ox O2 Delivery O2 Flow Rate FiO2


 


3/27/18 20:00 97.8 61 16 134/60 (84) 95   


 


3/27/18 16:15 97.9 59 17 144/67 (92) 97   


 


3/27/18 16:00 98.7 63 20 135/73 (93) 97   


 


3/27/18 12:00  73      


 


3/27/18 12:00 98.0 54 18 125/60 (81) 95   


 


3/27/18 11:21     96   21


 


3/27/18 08:00  58      


 


3/27/18 08:00 98.5 54 18 140/74 (96) 96   


 


3/27/18 08:00     96 Room Air  21


 


3/27/18 04:06  58      


 


3/27/18 04:06      Room Air  


 


3/27/18 04:00 98.0 69 16 116/61 (79) 97   


 


3/27/18 00:00 97.5 69 16 113/65 (81) 99   


 


3/27/18 00:00      Room Air  


 


3/27/18 00:00  69      














I/O      


 


 3/26/18 3/26/18 3/26/18 3/27/18 3/27/18 3/27/18





 07:00 15:00 23:00 07:00 15:00 23:00


 


Intake Total    960 ml  1080 ml


 


Output Total    275 ml  


 


Balance    685 ml  1080 ml


 


      


 


Intake Oral    960 ml  1080 ml


 


Output Urine Total    275 ml  


 


# Voids 3     3


 


# Bowel Movements 4     0








Physical Exam


GENERAL: NAD, AAOx3


SKIN: Warm and dry.


HEAD: Atraumatic. Normocephalic. 


EYES: Pupils equal and round. No scleral icterus. No injection or drainage. 


ENT: No nasal bleeding or discharge.  Mucous membranes pink and moist.


NECK: Trachea midline. No JVD. 


CARDIOVASCULAR: Regular rate and rhythm.  


RESPIRATORY: No accessory muscle use. Clear to auscultation. Breath sounds 

equal bilaterally. 


GASTROINTESTINAL: Abdomen soft, non-tender, nondistended. Hepatic and splenic 

margins not palpable. 


MUSCULOSKELETAL: Extremities without clubbing, cyanosis, or edema. No obvious 

deformities. 


NEUROLOGICAL: Awake and alert. No obvious cranial nerve deficits.  Motor 

grossly within normal limits. Five out of 5 muscle strength in the arms and 

legs.  Normal speech.


PSYCHIATRIC: Appropriate mood and affect; insight and judgment normal.


Laboratory





Laboratory Tests








Test


  3/27/18


06:00 3/27/18


11:04


 


White Blood Count 7.6 TH/MM3  


 


Red Blood Count 4.05 MIL/MM3  


 


Hemoglobin 12.7 GM/DL  


 


Hematocrit 36.8 %  


 


Mean Corpuscular Volume 90.7 FL  


 


Mean Corpuscular Hemoglobin 31.3 PG  


 


Mean Corpuscular Hemoglobin


Concent 34.5 % 


  


 


 


Red Cell Distribution Width 15.2 %  


 


Platelet Count 276 TH/MM3  


 


Mean Platelet Volume 7.8 FL  


 


Neutrophils (%) (Auto) 68.6 %  


 


Lymphocytes (%) (Auto) 19.8 %  


 


Monocytes (%) (Auto) 9.1 %  


 


Eosinophils (%) (Auto) 1.7 %  


 


Basophils (%) (Auto) 0.8 %  


 


Neutrophils # (Auto) 5.2 TH/MM3  


 


Lymphocytes # (Auto) 1.5 TH/MM3  


 


Monocytes # (Auto) 0.7 TH/MM3  


 


Eosinophils # (Auto) 0.1 TH/MM3  


 


Basophils # (Auto) 0.1 TH/MM3  


 


CBC Comment DIFF FINAL  


 


Differential Comment   


 


Blood Urea Nitrogen 21 MG/DL  


 


Creatinine 0.98 MG/DL  


 


Random Glucose 80 MG/DL  


 


Calcium Level 8.5 MG/DL  


 


Sodium Level 137 MEQ/L  


 


Potassium Level 3.9 MEQ/L  


 


Chloride Level 103 MEQ/L  


 


Carbon Dioxide Level 25.2 MEQ/L  


 


Anion Gap 9 MEQ/L  


 


Estimat Glomerular Filtration


Rate 78 ML/MIN 


  


 


 


Stool C. difficile Toxin (PCR)  NEGATIVE 


 


Stl C. difficile Toxin


Epiderm 027 


  PRESUMPTIVE


NEGATIVE











Assessment and Plan


Problem List:  


(1) Chest pain


ICD Codes:  R07.9 - Chest pain, unspecified


Status:  Acute


(2) Chronic pain


ICD Codes:  G89.29 - Other chronic pain


Status:  Acute


(3) Aortic stenosis


ICD Codes:  I35.0 - Aortic stenosis


Status:  Chronic


Assessment and Plan


1) Catheterization with normal coronaries


2) Mild AS on echo


3) Micturition syncope


4) May proceed with Cscope per GI for BRBPR





Problem Qualifiers





(1) Aortic stenosis:  


Qualified Codes:  I35.0 - Nonrheumatic aortic (valve) stenosis








Freddie Lara DO Mar 27, 2018 22:04

## 2018-03-27 NOTE — RADRPT
EXAM DATE/TIME:  03/27/2018 00:12 

 

[facility] COMPARISON:     

No previous studies available for comparison.

 

 

INDICATIONS :     

Abdomen pain; weight loss.

                  

 

ORAL CONTRAST:      

Partial prescribed oral contrast ingested.

                  

 

RADIATION DOSE:     

7.62 CTDIvol (mGy) 

 

 

MEDICAL HISTORY :     

Cardiovascular disease. Renal calculi. Hypertension.

 

SURGICAL HISTORY :      

Appendectomy. 

 

ENCOUNTER:      

Initial

 

ACUITY:      

1 day

 

PAIN SCALE:      

5/10

 

LOCATION:       

Bilateral  abdomen

 

TECHNIQUE:     

Volumetric scanning of the abdomen and pelvis was performed.  Using automated exposure control and ad
justment of the mA and/or kV according to patient size, radiation dose was kept as low as reasonably 
achievable to obtain optimal diagnostic quality images.  DICOM format image data is available electro
nically for review and comparison.  

 

FINDINGS:     

 

LOWER LUNGS:     

The visualized lower lungs are clear.

 

LIVER:     

Homogeneous density without lesion.  There is no dilation of the biliary tree.  No calcified gallston
es. Small hepatic low density towards the dome.

 

SPLEEN:     

Normal size without lesion.

 

PANCREAS:     

Within normal limits. 

 

KIDNEYS:     

Normal in size and shape.  There is no mass, stone, or hydronephrosis. Small renal low densities.

 

ADRENAL GLANDS:     

Within normal limits.

 

VASCULAR:     

There is no aortic aneurysm.

 

BOWEL/MESENTERY:     

There is an area of wall thickening within the distal descending colon versus nondistention. Few scat
tered diverticula without diverticulitis. There is no free intraperitoneal air or fluid. 

 

ABDOMINAL WALL:     

Within normal limits.

 

RETROPERITONEUM:     

There is no lymphadenopathy.

 

BLADDER:     

No wall thickening or mass.

 

REPRODUCTIVE:     

Within normal limits.

 

INGUINAL:     

There is no lymphadenopathy or hernia.

 

MUSCULOSKELETAL:     

Within normal limits for patient age.

 

CONCLUSION:     

1. Few scattered diverticula without diverticulitis.

2. Focal area wall thickening distal descending colon versus nondistention. Nonemergent barium enema 
versus colonoscopy recommended.

3. Hepatic and renal low densities likely benign.

 

 

 

 Dre Patton MD on March 27, 2018 at 0:22           

Board Certified Radiologist.

 This report was verified electronically.

## 2018-03-28 VITALS — HEART RATE: 49 BPM

## 2018-03-28 VITALS
HEART RATE: 52 BPM | SYSTOLIC BLOOD PRESSURE: 139 MMHG | TEMPERATURE: 97.5 F | OXYGEN SATURATION: 97 % | DIASTOLIC BLOOD PRESSURE: 62 MMHG | RESPIRATION RATE: 19 BRPM

## 2018-03-28 VITALS
RESPIRATION RATE: 20 BRPM | OXYGEN SATURATION: 95 % | SYSTOLIC BLOOD PRESSURE: 133 MMHG | DIASTOLIC BLOOD PRESSURE: 63 MMHG | TEMPERATURE: 97.6 F | HEART RATE: 67 BPM

## 2018-03-28 VITALS
HEART RATE: 56 BPM | OXYGEN SATURATION: 94 % | SYSTOLIC BLOOD PRESSURE: 114 MMHG | TEMPERATURE: 97.9 F | DIASTOLIC BLOOD PRESSURE: 59 MMHG | RESPIRATION RATE: 16 BRPM

## 2018-03-28 VITALS
DIASTOLIC BLOOD PRESSURE: 63 MMHG | OXYGEN SATURATION: 97 % | TEMPERATURE: 98.6 F | SYSTOLIC BLOOD PRESSURE: 137 MMHG | RESPIRATION RATE: 18 BRPM | HEART RATE: 65 BPM

## 2018-03-28 VITALS — HEART RATE: 50 BPM

## 2018-03-28 VITALS
OXYGEN SATURATION: 95 % | DIASTOLIC BLOOD PRESSURE: 67 MMHG | HEART RATE: 57 BPM | TEMPERATURE: 97.2 F | RESPIRATION RATE: 19 BRPM | SYSTOLIC BLOOD PRESSURE: 149 MMHG

## 2018-03-28 VITALS — HEART RATE: 67 BPM

## 2018-03-28 VITALS — HEART RATE: 66 BPM

## 2018-03-28 LAB — HBA1C MFR BLD: 5.9 % (ref 4.3–6)

## 2018-03-28 RX ADMIN — NITROGLYCERIN SCH INCH: 20 OINTMENT TOPICAL at 15:20

## 2018-03-28 RX ADMIN — METOPROLOL TARTRATE SCH MG: 25 TABLET, FILM COATED ORAL at 21:04

## 2018-03-28 RX ADMIN — METOPROLOL TARTRATE SCH MG: 25 TABLET, FILM COATED ORAL at 09:00

## 2018-03-28 RX ADMIN — NITROGLYCERIN SCH INCH: 20 OINTMENT TOPICAL at 04:56

## 2018-03-28 RX ADMIN — ISOSORBIDE MONONITRATE SCH MG: 30 TABLET, EXTENDED RELEASE ORAL at 04:56

## 2018-03-28 RX ADMIN — STANDARDIZED SENNA CONCENTRATE AND DOCUSATE SODIUM SCH TAB: 8.6; 5 TABLET, FILM COATED ORAL at 21:04

## 2018-03-28 RX ADMIN — PHENYTOIN SODIUM SCH MLS/HR: 50 INJECTION INTRAMUSCULAR; INTRAVENOUS at 11:55

## 2018-03-28 RX ADMIN — NITROGLYCERIN SCH INCH: 20 OINTMENT TOPICAL at 23:50

## 2018-03-28 RX ADMIN — NITROGLYCERIN SCH INCH: 20 OINTMENT TOPICAL at 12:00

## 2018-03-28 RX ADMIN — LISINOPRIL SCH MG: 20 TABLET ORAL at 14:40

## 2018-03-28 RX ADMIN — PHENYTOIN SODIUM SCH MLS/HR: 50 INJECTION INTRAMUSCULAR; INTRAVENOUS at 23:50

## 2018-03-28 RX ADMIN — OXYCODONE HYDROCHLORIDE AND ACETAMINOPHEN PRN TAB: 10; 325 TABLET ORAL at 04:56

## 2018-03-28 RX ADMIN — ESCITALOPRAM OXALATE SCH MG: 20 TABLET ORAL at 14:40

## 2018-03-28 RX ADMIN — DULOXETINE SCH MG: 60 CAPSULE, DELAYED RELEASE ORAL at 21:00

## 2018-03-28 RX ADMIN — PANTOPRAZOLE SCH MG: 40 TABLET, DELAYED RELEASE ORAL at 14:40

## 2018-03-28 RX ADMIN — OXYCODONE HYDROCHLORIDE AND ACETAMINOPHEN PRN TAB: 10; 325 TABLET ORAL at 21:11

## 2018-03-28 RX ADMIN — ASPIRIN SCH MG: 325 TABLET ORAL at 14:40

## 2018-03-28 RX ADMIN — STANDARDIZED SENNA CONCENTRATE AND DOCUSATE SODIUM SCH TAB: 8.6; 5 TABLET, FILM COATED ORAL at 09:00

## 2018-03-28 RX ADMIN — DULOXETINE SCH MG: 60 CAPSULE, DELAYED RELEASE ORAL at 14:39

## 2018-03-28 RX ADMIN — OXYCODONE HYDROCHLORIDE AND ACETAMINOPHEN PRN TAB: 10; 325 TABLET ORAL at 14:34

## 2018-03-28 NOTE — PD.CARD.PN
Subjective


Subjective Remarks


Patient was seen this afternoon, late entry note





Post-Cscope, unsure of results





Objective


Medications





Current Medications








 Medications


  (Trade)  Dose


 Ordered  Sig/Lashawn


 Route  Start Time


 Stop Time Status Last Admin


 


  (NS Flush)  2 ml  UNSCH  PRN


 IV FLUSH  3/23/18 23:45


    3/25/18 20:48


 


 


  (NS Flush)  2 ml  UNSCH  PRN


 IV FLUSH  3/23/18 23:45


     


 


 


  (Tylenol)  500 mg  Q4H  PRN


 PO  3/23/18 23:45


     


 


 


  (Zofran Inj)  4 mg  Q6H  PRN


 IV PUSH  3/23/18 23:45


    3/27/18 10:02


 


 


  (Xanax)  2 mg  HS


 PO  3/23/18 23:45


    3/27/18 21:34


 


 


  (Percocet 


 Mg)  1 tab  Q6H  PRN


 PO  3/23/18 23:45


    3/28/18 14:34


 


 


  (Nitrostat Sl)  0.4 mg  Q5M  PRN


 SL  3/24/18 07:30


     


 


 


  (Aspirin)  325 mg  DAILY


 PO  3/24/18 09:00


    3/28/18 14:40


 


 


  (CeleBREX)  200 mg  DAILY


 PO  3/24/18 09:00


   Future Hold 3/25/18 09:35


 


 


  (Cymbalta Dr)  60 mg  BID


 PO  3/24/18 09:00


    3/28/18 14:39


 


 


  (Prinivil)  20 mg  DAILY


 PO  3/24/18 09:00


    3/28/18 14:40


 


 


  (Microzide)  12.5 mg  DAILY


 PO  3/24/18 09:00


   Future Hold 3/25/18 09:34


 


 


  (Nitroglycerin


 2% Oint)  1 inch  Q6H


 TOPICAL  3/24/18 18:00


    3/24/18 20:10


 


 


  (Imdur)  30 mg  DAILY@07


 PO  3/25/18 11:30


    3/26/18 06:19


 


 


  (Lopressor)  12.5 mg  Q12HR


 PO  3/25/18 11:30


    3/25/18 20:45


 


 


  (Pill Splitter)  1 ea  UNSCH  PRN


 OTHER  3/25/18 11:30


     


 


 


  (Xanax)  1 mg  Q6H  PRN


 PO  3/25/18 14:45


    3/28/18 14:34


 


 


  (Lexapro)  20 mg  DAILY


 PO  3/25/18 15:15


    3/28/18 14:40


 


 


  (Summer-Colace)  1 tab  BID


 PO  3/25/18 23:00


    3/27/18 10:03


 


 


  (Milk Of


 Magnesia Liq)  30 ml  Q12H  PRN


 PO  3/25/18 23:00


     


 


 


  (Senokot)  17.2 mg  Q12H  PRN


 PO  3/25/18 23:00


     


 


 


  (Dulcolax Supp)  10 mg  DAILY  PRN


 RECTAL  3/25/18 23:00


     


 


 


 Lactated Ringer's  1,000 ml @ 


 30 mls/hr  Q24H PRN


 IV  3/26/18 03:30


 3/29/18 03:29   


 


 


 Sodium Chloride  500 ml @ 


 30 mls/hr  [id number] PRN


 IV  3/26/18 03:30


 3/29/18 03:29   


 


 


  (Betadine 5%


 Antisepsis Kit)  1 applic  ON CALL  PRN


 EACH NARE  3/26/18 03:30


 3/29/18 03:29   


 


 


  (Chlorhexidine


 2% Cloth)  3 pack  ON CALL  PRN


 TOPICAL  3/26/18 03:30


 3/29/18 03:29   


 


 


  (Protonix)  40 mg  DAILY


 PO  3/26/18 12:15


    3/28/18 14:40


 


 


 Sodium Chloride  1,000 ml @ 


 84 mls/hr  [id number]


 IV  3/26/18 12:15


    3/27/18 00:28


 


 


  (Bentyl)  20 mg  TID  PRN


 PO  3/27/18 11:00


    3/27/18 14:16


 








Vital Signs / I&O





Vital Signs








  Date Time  Temp Pulse Resp B/P (MAP) Pulse Ox O2 Delivery O2 Flow Rate FiO2


 


3/28/18 16:00 97.2 57 19 149/67 (94) 95   


 


3/28/18 08:00  52      


 


3/28/18 08:00 97.5 55 19 139/62 (87) 97   


 


3/28/18 08:00     96 Room Air  21


 


3/28/18 04:00 97.9 56 16 114/59 (77) 94   


 


3/28/18 03:59  49      


 


3/28/18 03:58      Room Air  


 


3/28/18 00:00      Room Air  


 


3/28/18 00:00  50      


 


3/27/18 23:58 97.7 50 17 115/56 (75) 96   


 


3/27/18 20:00 97.8 61 16 134/60 (84) 95   


 


3/27/18 20:00      Room Air  


 


3/27/18 20:00  53      














I/O      


 


 3/27/18 3/27/18 3/27/18 3/28/18 3/28/18 3/28/18





 06:59 14:59 22:59 06:59 14:59 22:59


 


Intake Total 960 ml  1080 ml 720 ml 100 ml 


 


Output Total 275 ml     


 


Balance 685 ml  1080 ml 720 ml 100 ml 


 


      


 


Intake Oral 960 ml  1080 ml 720 ml 0 ml 


 


Other     100 ml 


 


Output Urine Total 275 ml     


 


# Voids   3 4  


 


# Bowel Movements   0 0  








Physical Exam


GENERAL: NAD, AAOx3


SKIN: Warm and dry.


HEAD: Atraumatic. Normocephalic. 


EYES: Pupils equal and round. No scleral icterus. No injection or drainage. 


ENT: No nasal bleeding or discharge.  Mucous membranes pink and moist.


NECK: Trachea midline. No JVD. 


CARDIOVASCULAR: Regular rate and rhythm.  


RESPIRATORY: No accessory muscle use. Clear to auscultation. Breath sounds 

equal bilaterally. 


GASTROINTESTINAL: Abdomen soft, non-tender, nondistended. Hepatic and splenic 

margins not palpable. 


MUSCULOSKELETAL: Extremities without clubbing, cyanosis, or edema. No obvious 

deformities. 


NEUROLOGICAL: Awake and alert. No obvious cranial nerve deficits.  Motor 

grossly within normal limits. Five out of 5 muscle strength in the arms and 

legs.  Normal speech.


PSYCHIATRIC: Appropriate mood and affect; insight and judgment normal.





Assessment and Plan


Problem List:  


(1) Chest pain


ICD Codes:  R07.9 - Chest pain, unspecified


Status:  Acute


(2) Chronic pain


ICD Codes:  G89.29 - Other chronic pain


Status:  Acute


(3) Aortic stenosis


ICD Codes:  I35.0 - Aortic stenosis


Status:  Chronic


Assessment and Plan


1) Catheterization with normal coronaries


2) Mild AS on echo


3) Micturition syncope


4) No further cardiovascular work up, will see PRN, call with questions





Problem Qualifiers





(1) Aortic stenosis:  


Qualified Codes:  I35.0 - Nonrheumatic aortic (valve) stenosis








Freddie Lara DO Mar 28, 2018 19:36

## 2018-03-28 NOTE — HHI.DS
__________________________________________________





Discharge Summary


Admission Date


Mar 23, 2018 at 19:59


Discharge Date:  Mar 28, 2018


Admitting Diagnosis








CBC/BMP:  


3/27/18 0600                                                                   

             3/27/18 0600





Significant Findings





Laboratory Tests








Test


  3/25/18


20:46 3/26/18


03:04 3/26/18


04:03 3/27/18


06:00


 


Blood Urea Nitrogen


  


  29 MG/DL


(7-18) 


  21 MG/DL


(7-18)


 


Albumin


  


  3.3 GM/DL


(3.4-5.0) 


  


 


 


Sodium Level


  


  131 MEQ/L


(136-145) 


  


 


 


Estimat Glomerular Filtration


Rate 


  64 ML/MIN


(>89) 


  78 ML/MIN


(>89)


 


Triglycerides Level


  


  278 MG/DL


() 


  


 


 


White Blood Count


  


  


  13.0 TH/MM3


(4.0-11.0) 


 


 


Neutrophils (%) (Auto)


  


  


  75.5 %


(16.0-70.0) 


 


 


Monocytes (%) (Auto)


  


  


  8.3 %


(0.0-8.0) 9.1 %


(0.0-8.0)


 


Neutrophils # (Auto)


  


  


  9.8 TH/MM3


(1.8-7.7) 


 


 


Monocytes # (Auto)


  


  


  1.1 TH/MM3


(0-0.9) 


 


 


Red Blood Count


  


  


  


  4.05 MIL/MM3


(4.50-5.90)


 


Hemoglobin


  


  


  


  12.7 GM/DL


(13.0-17.0)


 


Hematocrit


  


  


  


  36.8 %


(39.0-51.0)


 


Test


  3/27/18


11:04 


  


  


 








Imaging





Last Impressions








Abdomen/Pelvis CT 3/26/18 0000 Signed





Impressions: 





 Service Date/Time:  Tuesday, March 27, 2018 00:12 - CONCLUSION:  1. Few 





 scattered diverticula without diverticulitis. 2. Focal area wall thickening 





 distal descending colon versus nondistention. Nonemergent barium enema versus 





 colonoscopy recommended. 3. Hepatic and renal low densities likely benign.     





 Dre Patton MD 


 


Carotid Artery Ultrasound 3/24/18 0000 Signed





Impressions: 





 Service Date/Time:  Saturday, March 24, 2018 16:20 - CONCLUSION: Negative for 





 hemodynamically significant stenosis   Pranav Gunter MD  FACR








PE at Discharge


GENERAL: Well-nourished, well-developed middle aged male patient in [facility].


SKIN: Warm and dry. No rash.


HEENT:  Normocephalic. Atraumatic.Pupils equal and round.   Mucous membranes 

pink and moist.


CARDIOVASCULAR: Regular rate and rhythm.  S1, S2 noted. No murmur appreciated. 

No chest wall tenderness to palpation. 


RESPIRATORY: No accessory muscle use. Clear to auscultation. Breath sounds 

equal bilaterally.  


GASTROINTESTINAL: Abdomen soft, non-tender, nondistended. Normoactive bowel 

sounds x4.


MUSCULOSKELETAL: No obvious deformities. Extremities without clubbing, cyanosis

, or edema. 


NEUROLOGICAL: Awake and alert. No obvious cranial nerve deficits.  Motor 

grossly within normal limits. Normal speech.


PSYCHIATRIC: Anxious mood, improved today; insight and judgment normal.


Pt Condition on Discharge:  Good


Discharge Disposition:  Discharge Home


Discharge Time:  > 30 minutes


Discharge Instructions


DIET: Follow Instructions for:  Heart Healthy Diet


Activities you can perform:  Regular-No Restrictions


Follow up Referrals:  


PCP Follow-up - 1 Week





New Medications:  


Lisinopril (Lisinopril) 20 Mg Tab


20 MG PO DAILY for cad, #31 TAB





Metoprolol Tartrate (Metoprolol Tartrate) 25 Mg Tab


12.5 MG PO Q12HR for cad, #62 TAB





 


Continued Medications:  


Alprazolam (Xanax) 2 Mg Tab


2 MG PO HS, TAB 0 Refills





Aspirin DR (Aspirin EC) 81 Mg Tabdr


81 MG PO DAILY, TAB 0 Refills





Celecoxib (Celebrex) 200 Mg Cap


200 MG PO DAILY for Pain Management, CAP 0 Refills





Duloxetine DR (Cymbalta DR) 60 Mg Capdr


60 MG PO DAILY, #30 CAP 0 Refills





Escitalopram (Lexapro) 20 Mg Tab


20 MG PO DAILY, #30 TAB 0 Refills





Lisinopril-Hctz (Lisinopril-Hctz) 20-12.5 Mg Tab


1 TAB PO DAILY for Blood Pressure Management, #30 TAB 0 Refills





Morphine ER (Ms Contin) 200 Mg Tab


50 MG PO BID for Pain Management, TAB 0 Refills





Oxycodone-Acetaminophen (Percocet)  mg Tab


1 TAB PO Q4HR PRN for PAIN, TAB 0 Refills





Sildenafil (Viagra) 100 Mg Tab


100 MG PO DAILY PRN for ERECTILE DYSFUNCTION, TAB 0 Refills





Testosterone Cypionate Inj (Testosterone Cypionate Inj) 100 Mg/Ml Inj


100 MG IM WEEKLY for Hormone Replacement, #1 INJECTION 0 Refills

















Edgar Branch MD Mar 28, 2018 14:44

## 2018-03-28 NOTE — PD.PROCEDR
GI Procedure





PROCEDURE PERFORMED


EGD with biopsy followed by a colonoscopy





INDICATION FOR PROCEDURE


Abdominal pain, dysphagia, bright red blood per rectum, weight loss





PROCEDURE:


The procedure, risks and benefits were discussed with Ms. Serna and informed


consent was obtained.  Anesthesia sedated her with Diprivan.  She was placed in 

the left lateral decubitus position.





EGD:


The Pentax videoscope was introduced through the oropharynx and advanced to the 

second portion of the duodenum under direct visualization. Retroflexion was 

performed in the stomach.





FINDINGS:


The esophagus this appeared to be unremarkable and within normal limits 

biopsies were taken for further evaluation


The stomach there was patchy erythema noted in the antrum but no ulcerations no 

erosions no blood or bleeding the rest of the stomach was unremarkable antral 

biopsies were taken for further evaluation


The duodenum this was normal





Colonoscopy:


The Pentax videoscope was introduced through the rectum and advanced to cecum 

where the ileocecal valve and appendiceal orifice were identified. Retroflexion 

was performed in the rectum. Colonic prep was good





FINDINGS:


Colonic withdrawal time greater than 6 minutes.  As the scope was slowly 

withdrawn colonic mucosa was carefully inspected the colonic mucosa appeared to 

be unremarkable with normal limits all the way through the patient was noted to 

have some mild diverticulosis in the sigmoid region retroflexion in the rectum 

did reveal small size internal hemorrhoids rectal examination otherwise 

unremarkable





ESTIMATED BLOOD LOSS:


None





SPECIMENS REMOVED:


Biopsies from the esophagus and the stomach





COMPLICATIONS:


None





IMPRESSION:


Gastritis


Diverticulosis


Internal hemorrhoids








PLAN:


Await biopsies


Supportive care


High-fiber diet


Avoid straining


Colonoscopy in 5 years


No much to add from a GI standpoint we will sign off











Seth Harry MD Mar 28, 2018 20:37

## 2018-03-29 VITALS
RESPIRATION RATE: 16 BRPM | OXYGEN SATURATION: 95 % | DIASTOLIC BLOOD PRESSURE: 74 MMHG | TEMPERATURE: 97.8 F | SYSTOLIC BLOOD PRESSURE: 127 MMHG | HEART RATE: 65 BPM

## 2018-03-29 VITALS
OXYGEN SATURATION: 97 % | HEART RATE: 65 BPM | DIASTOLIC BLOOD PRESSURE: 92 MMHG | SYSTOLIC BLOOD PRESSURE: 139 MMHG | RESPIRATION RATE: 16 BRPM | TEMPERATURE: 97.6 F

## 2018-03-29 VITALS
DIASTOLIC BLOOD PRESSURE: 74 MMHG | OXYGEN SATURATION: 98 % | RESPIRATION RATE: 20 BRPM | SYSTOLIC BLOOD PRESSURE: 147 MMHG | HEART RATE: 86 BPM | TEMPERATURE: 97.9 F

## 2018-03-29 VITALS
DIASTOLIC BLOOD PRESSURE: 54 MMHG | HEART RATE: 70 BPM | RESPIRATION RATE: 19 BRPM | OXYGEN SATURATION: 94 % | SYSTOLIC BLOOD PRESSURE: 117 MMHG | TEMPERATURE: 98.2 F

## 2018-03-29 VITALS
TEMPERATURE: 97.8 F | SYSTOLIC BLOOD PRESSURE: 147 MMHG | HEART RATE: 52 BPM | RESPIRATION RATE: 16 BRPM | DIASTOLIC BLOOD PRESSURE: 65 MMHG | OXYGEN SATURATION: 94 %

## 2018-03-29 VITALS — DIASTOLIC BLOOD PRESSURE: 73 MMHG | SYSTOLIC BLOOD PRESSURE: 128 MMHG

## 2018-03-29 VITALS — HEART RATE: 51 BPM

## 2018-03-29 RX ADMIN — METOPROLOL TARTRATE SCH MG: 25 TABLET, FILM COATED ORAL at 09:59

## 2018-03-29 RX ADMIN — OXYCODONE HYDROCHLORIDE AND ACETAMINOPHEN PRN TAB: 10; 325 TABLET ORAL at 10:00

## 2018-03-29 RX ADMIN — DULOXETINE SCH MG: 60 CAPSULE, DELAYED RELEASE ORAL at 09:59

## 2018-03-29 RX ADMIN — ASPIRIN SCH MG: 325 TABLET ORAL at 09:59

## 2018-03-29 RX ADMIN — ESCITALOPRAM OXALATE SCH MG: 20 TABLET ORAL at 09:59

## 2018-03-29 RX ADMIN — PANTOPRAZOLE SCH MG: 40 TABLET, DELAYED RELEASE ORAL at 09:59

## 2018-03-29 RX ADMIN — STANDARDIZED SENNA CONCENTRATE AND DOCUSATE SODIUM SCH TAB: 8.6; 5 TABLET, FILM COATED ORAL at 09:59

## 2018-03-29 RX ADMIN — OXYCODONE HYDROCHLORIDE AND ACETAMINOPHEN PRN TAB: 10; 325 TABLET ORAL at 03:09

## 2018-03-29 RX ADMIN — ISOSORBIDE MONONITRATE SCH MG: 30 TABLET, EXTENDED RELEASE ORAL at 06:16

## 2018-03-29 RX ADMIN — LISINOPRIL SCH MG: 20 TABLET ORAL at 09:59

## 2018-03-29 RX ADMIN — OXYCODONE HYDROCHLORIDE AND ACETAMINOPHEN PRN TAB: 10; 325 TABLET ORAL at 16:37

## 2018-03-29 NOTE — HHI.PR
Subjective


Remarks


Follow-up syncope.  States he passed out again this morning and had slight 

dizziness prior to episode.  He did not sustain injury.  Discussed with nursing

, syncopal episode again not witnessed.  He is not orthostatic.





Objective


Vitals





Vital Signs








  Date Time  Temp Pulse Resp B/P (MAP) Pulse Ox O2 Delivery O2 Flow Rate FiO2


 


3/29/18 16:00 97.6 65 16 139/92 (108) 97   


 


3/29/18 13:40    128/73 (91)    





    128/66 (86)    





    138/66 (90)    


 


3/29/18 12:00 97.8 78 16 147/65 (92) 94   


 


3/29/18 12:00  58      


 


3/29/18 12:00  52      


 


3/29/18 08:00      Room Air  


 


3/29/18 08:00 97.8 65 16 127/74 (91) 95   


 


3/29/18 08:00  50      


 


3/29/18 06:58 97.9 86 20 147/74 (98) 98   


 


3/29/18 04:11  51      


 


3/29/18 04:00 98.2 70 19 117/54 (75) 94   


 


3/28/18 23:35  67      


 


3/28/18 23:00      Room Air  


 


3/28/18 23:00 97.6 67 20 133/63 (86) 95   


 


3/28/18 20:15 98.6 65 18 137/63 (87) 97   


 


3/28/18 20:00  66      


 


3/28/18 20:00      Room Air  














I/O      


 


 3/28/18 3/28/18 3/28/18 3/29/18 3/29/18 3/29/18





 07:00 15:00 23:00 07:00 15:00 23:00


 


Intake Total 720 ml 100 ml  600 ml  


 


Balance 720 ml 100 ml  600 ml  


 


      


 


Intake Oral 720 ml 0 ml  600 ml  


 


Other  100 ml    


 


# Voids 4   2  


 


# Bowel Movements 0   0  








Result Diagram:  


3/27/18 0600                                                                   

             3/27/18 0600





Imaging





Last Impressions








Abdomen/Pelvis CT 3/26/18 0000 Signed





Impressions: 





 Service Date/Time:  Tuesday, March 27, 2018 00:12 - CONCLUSION:  1. Few 





 scattered diverticula without diverticulitis. 2. Focal area wall thickening 





 distal descending colon versus nondistention. Nonemergent barium enema versus 





 colonoscopy recommended. 3. Hepatic and renal low densities likely benign.     





 Dre F. Tocci, MD 


 


Carotid Artery Ultrasound 3/24/18 0000 Signed





Impressions: 





 Service Date/Time:  Saturday, March 24, 2018 16:20 - CONCLUSION: Negative for 





 hemodynamically significant stenosis   Pranav Gunter MD  FACR








Objective Remarks


GENERAL: Well-nourished, well-developed middle aged male patient in [facility].  No 

apparent injury


SKIN: Warm and dry. No rash.


CARDIOVASCULAR: Regular rate and rhythm.  S1, S2 noted. No murmur appreciated. 

No chest wall tenderness to palpation. 


RESPIRATORY: No accessory muscle use. Clear to auscultation. Breath sounds 

equal bilaterally.  


GASTROINTESTINAL: Abdomen soft, non-tender, nondistended. Normoactive bowel 

sounds x4.


MUSCULOSKELETAL: No obvious deformities. Extremities without clubbing, cyanosis

, or edema. 


NEUROLOGICAL: Awake and alert. No obvious cranial nerve deficits.  Motor 

grossly within normal limits. Normal speech.


PSYCHIATRIC: Anxious mood, improved today; insight and judgment normal.


Procedures


EGD C scope





A/P


Problem List:  


(1) Atypical chest pain


ICD Code:  R07.89 - Other chest pain


Status:  Resolved


Assessment and Plan


57-year-old male with history of anxiety, PTSD, bipolar disorder, mild aortic 

stenosis, hypertension, presents with intermittent chest pain.  Initially 

admitted to [place] ER, then transferred to [facility] chest pain center, 

however patient had syncopal episode while in the hospital, found with brief 

questionable episode of unresponsiveness, therefore admitted to hospitalist for 

further evaluation. 





Chest pain: atypical.  Negative workup including CTA.  Cardiac extravasation 

with mild CAD.  Continue aspirin.  LDL 74


   


GI Bleeding with BRBPR: reported during hospitalization on 3/26. One episode of 

BRBPR.  Patient reports hx of colonoscopy that was unremarkable however had 

poor prep.  EGD with gastritis pathology with reactive gastropathy's seen with 

bile reflux or drug therapy.  Distal esophagus biopsy with squamous mucosa with 

mild chronic inflammation continue PPI.  Colonoscopy with diverticulosis and 

internal hemorrhoids


   


Syncope: Workup unremarkable per cardiology this is micturition syncope patient 

educated.  Discussed with nursing, patient may also be malingering as he 

reported several episodes of syncope all unwitnessed with no apparent injuries





Mild Aortic Stenosis: unchanged unlikely etiology of patient's symptoms, 

continue outpatient f/up with cardiology





Hypertension: BP stable, continue home meds of lisinopril.  Started on 

metoprolol and Imdur.  Continue to hold HCTZ





Anxiety/PTSD/Bipolar Disorder: patient with severe anxiety on exam improved 

continue patient's Lexapro, Xanax prn, Cymbalta, Celebrex


   -strongly encouraged outpatient f/up with PCP and psychiatrist upon discharge





DVT Prophylaxis: teds/SCDs


Discharge Planning


Discharge today











Mckay Raya MD Mar 29, 2018 17:04

## 2018-03-29 NOTE — HHI.PR
Subjective


Remarks


NOT SEEN





Objective


Vitals





Vital Signs








  Date Time  Temp Pulse Resp B/P (MAP) Pulse Ox O2 Delivery O2 Flow Rate FiO2


 


3/28/18 23:35  67      


 


3/28/18 23:00      Room Air  


 


3/28/18 23:00 97.6 67 20 133/63 (86) 95   


 


3/28/18 20:15 98.6 65 18 137/63 (87) 97   


 


3/28/18 20:00  66      


 


3/28/18 20:00      Room Air  


 


3/28/18 16:00 97.2 57 19 149/67 (94) 95   


 


3/28/18 08:00  52      


 


3/28/18 08:00 97.5 55 19 139/62 (87) 97   


 


3/28/18 08:00     96 Room Air  21


 


3/28/18 04:00 97.9 56 16 114/59 (77) 94   


 


3/28/18 03:59  49      


 


3/28/18 03:58      Room Air  














I/O      


 


 3/28/18 3/28/18 3/28/18 3/29/18 3/29/18 3/29/18





 07:00 15:00 23:00 07:00 15:00 23:00


 


Intake Total 720 ml 100 ml    


 


Balance 720 ml 100 ml    


 


      


 


Intake Oral 720 ml 0 ml    


 


Other  100 ml    


 


# Voids 4     


 


# Bowel Movements 0     








Result Diagram:  


3/27/18 0600                                                                   

             3/27/18 0600





Imaging





Last Impressions








Abdomen/Pelvis CT 3/26/18 0000 Signed





Impressions: 





 Service Date/Time:  Tuesday, March 27, 2018 00:12 - CONCLUSION:  1. Few 





 scattered diverticula without diverticulitis. 2. Focal area wall thickening 





 distal descending colon versus nondistention. Nonemergent barium enema versus 





 colonoscopy recommended. 3. Hepatic and renal low densities likely benign.     





 Dre Patton MD 


 


Carotid Artery Ultrasound 3/24/18 0000 Signed





Impressions: 





 Service Date/Time:  Saturday, March 24, 2018 16:20 - CONCLUSION: Negative for 





 hemodynamically significant stenosis   Pranav Gunter MD  FACR








Objective Remarks


GENERAL: Well-nourished, well-developed middle aged male patient in [facility].


SKIN: Warm and dry. No rash.


HEENT:  Normocephalic. Atraumatic.Pupils equal and round.   Mucous membranes 

pink and moist.


CARDIOVASCULAR: Regular rate and rhythm.  S1, S2 noted. No murmur appreciated. 

No chest wall tenderness to palpation. 


RESPIRATORY: No accessory muscle use. Clear to auscultation. Breath sounds 

equal bilaterally.  


GASTROINTESTINAL: Abdomen soft, non-tender, nondistended. Normoactive bowel 

sounds x4.


MUSCULOSKELETAL: No obvious deformities. Extremities without clubbing, cyanosis

, or edema. 


NEUROLOGICAL: Awake and alert. No obvious cranial nerve deficits.  Motor 

grossly within normal limits. Normal speech.


PSYCHIATRIC: Anxious mood, improved today; insight and judgment normal.


Procedures


EGD C scope





A/P


Problem List:  


(1) Atypical chest pain


ICD Code:  R07.89 - Other chest pain


Status:  Resolved


Assessment and Plan


57-year-old male with history of anxiety, PTSD, bipolar disorder, mild aortic 

stenosis, hypertension, presents with intermittent chest pain.  Initially 

admitted to [place] ER, then transferred to [facility] chest pain center, 

however patient had syncopal episode while in the hospital, found with brief 

questionable episode of unresponsiveness, therefore admitted to hospitalist for 

further evaluation. 





Chest pain: atypical. 


   -EMR Reviewed, Nuclear Stress Test 11/13/17 showed no reversible perfusion 

defect to indicate stress-induced ischemia.  


   -ACS ruled out on 3/23 at [facility] with negative serial cardiac enzymes x3 

and EKG without acute ischemic changes


   -CT-PA on 3/23 also negative, no pulmonary embolism


   -Echocardiogram 3/24 with EF 60-65%, mild AS, mild-mod AR, trace TR


   -Continue aspirin, imdur, ACE, BB, nitro ointment


   -Consulted cardiology


   -Discussed with Dr. Lara, 3/27 SP cardiac catheterization with minimal 

CAD.  Medical management recommended.





GI Bleeding with BRBPR: reported during hospitalization on 3/26. One episode of 

BRBPR.  Patient reports hx of colonoscopy that was unremarkable however had 

poor prep.


   -Hgb stable at 14.9, will continue to monitor


   -start on PPI


   -Give IVF hydration


   -check stool hemoccult


   -3/27 Appreciate GI recommendations. EGD with Gastritis Cscope with 

Diverticulosis Internal hemorrhoids f/u bx








Syncope: report from chest pain center and nursing staff reports questionable 

episode of syncope after patient was discharged, not witnessed by staff


   -echocardiogram unremarkable as above, mild AS unlikely cause of syncope


   -ruling out ACS as above


   -carotid U/S with no stenosis


   -orthostatics negative 


   -no further syncopal events





Mild Aortic Stenosis: unchanged


   -unlikely etiology of patient's symptoms


   -continue outpatient f/up with cardiology





Hypertension: BP soft


   -continued patient's home meds including lisinopril, hold HCTZ


   -also started on metoprolol and imdur with CAD


   -may need to discontinue HCTZ at discharge and decrease dose of lisinopril 

if BP continues to be low





Anxiety/PTSD/Bipolar Disorder: patient with severe anxiety on exam


   -continue patient's Lexapro, Xanax prn, Cymbalta, Celebrex


   -strongly encouraged outpatient f/up with PCP and psychiatrist upon discharge





DVT Prophylaxis: teds/SCDs











Mckay Raya MD Mar 29, 2018 03:53

## 2018-03-29 NOTE — HHI.DS
__________________________________________________





Discharge Summary


Admission Date


Mar 23, 2018 at 19:59


Discharge Date:  Mar 29, 2018


Admitting Diagnosis








(1) Atypical chest pain


ICD Code:  R07.89 - Other chest pain


Diagnosis:  Principal


Status:  Resolved


Procedures


EGD C scope


Brief History - From Admission


57 year old male with history of aortic stenosis, PTSD, anxiety, and 

hypertension presented to ER for further evaluation of chest pain


CBC/BMP:  


3/27/18 0600                                                                   

             3/27/18 0600





Significant Findings





Laboratory Tests








Test


  3/27/18


06:00 3/27/18


11:04


 


Red Blood Count


  4.05 MIL/MM3


(4.50-5.90) 


 


 


Hemoglobin


  12.7 GM/DL


(13.0-17.0) 


 


 


Hematocrit


  36.8 %


(39.0-51.0) 


 


 


Monocytes (%) (Auto)


  9.1 %


(0.0-8.0) 


 


 


Blood Urea Nitrogen


  21 MG/DL


(7-18) 


 


 


Estimat Glomerular Filtration


Rate 78 ML/MIN


(>89) 


 








Imaging





Last Impressions








Abdomen/Pelvis CT 3/26/18 0000 Signed





Impressions: 





 Service Date/Time:  Tuesday, March 27, 2018 00:12 - CONCLUSION:  1. Few 





 scattered diverticula without diverticulitis. 2. Focal area wall thickening 





 distal descending colon versus nondistention. Nonemergent barium enema versus 





 colonoscopy recommended. 3. Hepatic and renal low densities likely benign.     





 Dre Patton MD 


 


Carotid Artery Ultrasound 3/24/18 0000 Signed





Impressions: 





 Service Date/Time:  Saturday, March 24, 2018 16:20 - CONCLUSION: Negative for 





 hemodynamically significant stenosis   Pranav Gunter MD  FACR








PE at Discharge


GENERAL: Well-nourished, well-developed middle aged male patient in [facility].


SKIN: Warm and dry. No rash.


HEENT:  Normocephalic. Atraumatic.Pupils equal and round.   Mucous membranes 

pink and moist.


CARDIOVASCULAR: Regular rate and rhythm.  S1, S2 noted. No murmur appreciated. 

No chest wall tenderness to palpation. 


RESPIRATORY: No accessory muscle use. Clear to auscultation. Breath sounds 

equal bilaterally.  


GASTROINTESTINAL: Abdomen soft, non-tender, nondistended. Normoactive bowel 

sounds x4.


MUSCULOSKELETAL: No obvious deformities. Extremities without clubbing, cyanosis

, or edema. 


NEUROLOGICAL: Awake and alert. No obvious cranial nerve deficits.  Motor 

grossly within normal limits. Normal speech.


PSYCHIATRIC: Anxious mood, improved today; insight and judgment normal.


Hospital Course


57-year-old male with history of anxiety, PTSD, bipolar disorder, mild aortic 

stenosis, hypertension, presents with intermittent chest pain.  Initially 

admitted to [facility], then transferred to [facility] chest pain center, 

however patient had syncopal episode while in the hospital, found with brief 

questionable episode of unresponsiveness, therefore admitted to hospitalist for 

further evaluation. 





Chest pain: atypical.  Negative workup including CTA.  Cardiac extravasation 

with mild CAD.  Continue aspirin.  LDL 74


   


GI Bleeding with BRBPR: reported during hospitalization on 3/26. One episode of 

BRBPR.  Patient reports hx of colonoscopy that was unremarkable however had 

poor prep.  EGD with gastritis pathology with reactive gastropathy's seen with 

bile reflux or drug therapy.  Distal esophagus biopsy with squamous mucosa with 

mild chronic inflammation continue PPI.  Colonoscopy with diverticulosis and 

internal hemorrhoids


   


Syncope: Workup unremarkable per cardiology this is micturition syncope patient 

educated.  Discussed with nursing, patient may also be malingering as he 

reported several episodes of syncope all unwitnessed with no apparent injuries





Mild Aortic Stenosis: unchanged unlikely etiology of patient's symptoms, 

continue outpatient f/up with cardiology





Hypertension: BP stable, continue home meds of lisinopril.  Started on 

metoprolol and Imdur.  Continue to hold HCTZ





Anxiety/PTSD/Bipolar Disorder: patient with severe anxiety on exam improved 

continue patient's Lexapro, Xanax prn, Cymbalta, Celebrex


   -strongly encouraged outpatient f/up with PCP and psychiatrist upon discharge





DVT Prophylaxis: teds/SCDs


Pt Condition on Discharge:  Good


Discharge Disposition:  Discharge Home


Discharge Time:  > 30 minutes


Discharge Instructions


DIET: Follow Instructions for:  Heart Healthy Diet


Activities you can perform:  Regular-No Restrictions


Follow up Referrals:  


Appointment for Follow Up @ [facility]


Cardiology - 2 Weeks


Cardiology @ [facility]


Gastroenterology - 2 Weeks


PCP Follow-up - 1 Week


PCP Follow-up - 1 Week @ [facility]


PCP Follow-up @ [facility]





New Medications:  


Isosorbide Mononitrate ER (Isosorbide Mononitrate ER) 30 Mg Eyal


30 MG PO DAILY@07 for Blood Pressure Management, #30 TAB





Lisinopril (Lisinopril) 20 Mg Tab


20 MG PO DAILY for cad, #31 TAB





Metoprolol Tartrate (Metoprolol Tartrate) 25 Mg Tab


12.5 MG PO Q12HR for cad, #62 TAB





Pantoprazole (Pantoprazole) 40 Mg Tab


40 MG PO DAILY for Manage Heartburn, #30 TAB





 


Continued Medications:  


Alprazolam (Xanax) 2 Mg Tab


2 MG PO HS, TAB 0 Refills





Aspirin DR (Aspirin EC) 81 Mg Tabdr


81 MG PO DAILY, TAB 0 Refills





Celecoxib (Celebrex) 200 Mg Cap


200 MG PO DAILY for Pain Management, CAP 0 Refills





Duloxetine DR (Cymbalta DR) 60 Mg Capdr


60 MG PO DAILY, #30 CAP 0 Refills





Escitalopram (Lexapro) 20 Mg Tab


20 MG PO DAILY, #30 TAB 0 Refills





Morphine ER (Ms Contin) 200 Mg Tab


50 MG PO BID for Pain Management, TAB 0 Refills





Oxycodone-Acetaminophen (Percocet)  mg Tab


1 TAB PO Q4HR PRN for PAIN, TAB 0 Refills





Testosterone Cypionate Inj (Testosterone Cypionate Inj) 100 Mg/Ml Inj


100 MG IM WEEKLY for Hormone Replacement, #1 INJECTION 0 Refills








Additional Information


Clarification with doses of Xanax he takes 1 mg 3 times a day and 2 mg at 

bedtime and MS Contin 30 mg twice a day, he has supply of both medicines











Mckay Raya MD Mar 29, 2018 11:19

## 2018-06-06 ENCOUNTER — HOSPITAL ENCOUNTER (OUTPATIENT)
Dept: HOSPITAL 17 - NEDDLT | Age: 58
Setting detail: OBSERVATION
LOS: 4 days | Discharge: HOME | End: 2018-06-10
Attending: HOSPITALIST | Admitting: HOSPITALIST
Payer: MEDICARE

## 2018-06-06 VITALS
SYSTOLIC BLOOD PRESSURE: 196 MMHG | RESPIRATION RATE: 22 BRPM | HEART RATE: 68 BPM | DIASTOLIC BLOOD PRESSURE: 111 MMHG | OXYGEN SATURATION: 95 % | TEMPERATURE: 98.2 F

## 2018-06-06 VITALS — WEIGHT: 184.09 LBS | HEIGHT: 70 IN | BODY MASS INDEX: 26.35 KG/M2

## 2018-06-06 DIAGNOSIS — L98.9: ICD-10-CM

## 2018-06-06 DIAGNOSIS — Z80.0: ICD-10-CM

## 2018-06-06 DIAGNOSIS — I25.10: ICD-10-CM

## 2018-06-06 DIAGNOSIS — K62.5: ICD-10-CM

## 2018-06-06 DIAGNOSIS — R10.11: ICD-10-CM

## 2018-06-06 DIAGNOSIS — K64.9: Primary | ICD-10-CM

## 2018-06-06 DIAGNOSIS — G89.29: ICD-10-CM

## 2018-06-06 DIAGNOSIS — K29.70: ICD-10-CM

## 2018-06-06 DIAGNOSIS — F43.10: ICD-10-CM

## 2018-06-06 DIAGNOSIS — Z86.14: ICD-10-CM

## 2018-06-06 DIAGNOSIS — F31.9: ICD-10-CM

## 2018-06-06 DIAGNOSIS — K57.90: ICD-10-CM

## 2018-06-06 DIAGNOSIS — F17.210: ICD-10-CM

## 2018-06-06 DIAGNOSIS — F14.90: ICD-10-CM

## 2018-06-06 DIAGNOSIS — I35.0: ICD-10-CM

## 2018-06-06 DIAGNOSIS — N28.1: ICD-10-CM

## 2018-06-06 PROCEDURE — 81001 URINALYSIS AUTO W/SCOPE: CPT

## 2018-06-06 PROCEDURE — C9113 INJ PANTOPRAZOLE SODIUM, VIA: HCPCS

## 2018-06-06 PROCEDURE — 80053 COMPREHEN METABOLIC PANEL: CPT

## 2018-06-06 PROCEDURE — 96366 THER/PROPH/DIAG IV INF ADDON: CPT

## 2018-06-06 PROCEDURE — 83735 ASSAY OF MAGNESIUM: CPT

## 2018-06-06 PROCEDURE — 87328 CRYPTOSPORIDIUM AG IA: CPT

## 2018-06-06 PROCEDURE — 87329 GIARDIA AG IA: CPT

## 2018-06-06 PROCEDURE — 80069 RENAL FUNCTION PANEL: CPT

## 2018-06-06 PROCEDURE — 80307 DRUG TEST PRSMV CHEM ANLYZR: CPT

## 2018-06-06 PROCEDURE — 78227 HEPATOBIL SYST IMAGE W/DRUG: CPT

## 2018-06-06 PROCEDURE — 99285 EMERGENCY DEPT VISIT HI MDM: CPT

## 2018-06-06 PROCEDURE — 96375 TX/PRO/DX INJ NEW DRUG ADDON: CPT

## 2018-06-06 PROCEDURE — 85610 PROTHROMBIN TIME: CPT

## 2018-06-06 PROCEDURE — 76705 ECHO EXAM OF ABDOMEN: CPT

## 2018-06-06 PROCEDURE — 85018 HEMOGLOBIN: CPT

## 2018-06-06 PROCEDURE — A9537 TC99M MEBROFENIN: HCPCS

## 2018-06-06 PROCEDURE — 85025 COMPLETE CBC W/AUTO DIFF WBC: CPT

## 2018-06-06 PROCEDURE — 96374 THER/PROPH/DIAG INJ IV PUSH: CPT

## 2018-06-06 PROCEDURE — 74177 CT ABD & PELVIS W/CONTRAST: CPT

## 2018-06-06 PROCEDURE — 76937 US GUIDE VASCULAR ACCESS: CPT

## 2018-06-06 PROCEDURE — G0378 HOSPITAL OBSERVATION PER HR: HCPCS

## 2018-06-06 PROCEDURE — 85730 THROMBOPLASTIN TIME PARTIAL: CPT

## 2018-06-06 PROCEDURE — 83690 ASSAY OF LIPASE: CPT

## 2018-06-06 PROCEDURE — 82150 ASSAY OF AMYLASE: CPT

## 2018-06-06 PROCEDURE — 96365 THER/PROPH/DIAG IV INF INIT: CPT

## 2018-06-06 PROCEDURE — 87506 IADNA-DNA/RNA PROBE TQ 6-11: CPT

## 2018-06-06 PROCEDURE — 80048 BASIC METABOLIC PNL TOTAL CA: CPT

## 2018-06-06 PROCEDURE — 85014 HEMATOCRIT: CPT

## 2018-06-06 PROCEDURE — 96361 HYDRATE IV INFUSION ADD-ON: CPT

## 2018-06-06 PROCEDURE — 80076 HEPATIC FUNCTION PANEL: CPT

## 2018-06-06 PROCEDURE — 96376 TX/PRO/DX INJ SAME DRUG ADON: CPT

## 2018-06-06 RX ADMIN — HYDROMORPHONE HYDROCHLORIDE PRN MG: 2 INJECTION INTRAMUSCULAR; INTRAVENOUS; SUBCUTANEOUS at 23:20

## 2018-06-06 NOTE — HHI.HP
__________________________________________________





HPI


Service


[facility]


Primary Care Physician


Unknown


Admission Diagnosis





Diagnoses:  


Chief Complaint:  


rectal bleeding and RUQ pain


Travel History


International Travel<30 Days:  No


Contact w/Intl Traveler <30 Da:  No


History of Present Illness


58 y/o male with a history of PTSD, anxiety, depression and chronic pain 

presented to the ED with complaints of abdominal pain and rectal bleeding. He 

states the pain began yesterday in his RUQ, 8/10 with radiation to his back and 

associated nausea. Worse with palpation, and slightly better with pain 

medication. He states the rectal bleeding has been going on intermittently for 

the last 6 months, he had a colonoscopy 6 months ago but did not follow up with 

the GI specialist and he has stop taking his Protonix because he states it does 

not work. He is also complaining on multiple wounds on his left arm and 

bilateral feet that he says will not heal and he has a history of MRSA. He 

states the wounds started as pimples that he popped and pus drained from them. 

He is unsure if he has had fevers at home but has had chills.





Review of Systems


Except as stated in HPI:  all other systems reviewed are Neg





Past Family Social History


Past Medical History


PTSD


anxiety


depression 


chronic pain


Past Surgical History


Cervical fusion x 3


right hip replacement 


right knee arthroscopy


Reported Medications





Reported Meds & Active Scripts


Active


Reported


Oxycontin (Oxycodone HCl) 10 Mg Tab Unknown Dose PO Q12HR


Cymbalbrandan CINTRON (Duloxetine HCl) 60 Mg Capdr 60 Mg PO DAILY


Testosterone Cypionate Inj (Testosterone Cypionate) 100 Mg/Ml Inj 100 Mg IM 

WEEKLY


Ms Contin (Morphine Sulfate) 200 Mg Tab 50 Mg PO BID


Xanax (Alprazolam) 2 Mg Tab 2 Mg PO HS


Aspirin EC (Aspirin) 81 Mg Tabdr 81 Mg PO DAILY


Allergies:  


Coded Allergies:  


     phenobarbital (Verified  Allergy, Unknown, unknown, 18)


Active Ordered Medications





Current Medications








 Medications


  (Trade)  Dose


 Ordered  Sig/Lashawn


 Route  Start Time


 Stop Time Status Last Admin


 


  (Xanax)  2 mg  HS


 PO  18 21:00


    18 21:08


 


 


  (Cymbalta Dr)  60 mg  DAILY


 PO  18 09:00


     


 


 


  (NS Flush)  2 ml  UNSCH  PRN


 IV FLUSH  18 17:15


     


 


 


  (NS Flush)  2 ml  BID


 IV FLUSH  18 21:00


    18 00:26


 


 


  (Zofran  Odt)  4 mg  Q6H  PRN


 SL  18 17:15


     


 


 


  (Protonix Inj)  40 mg  DAILY


 IV PUSH  18 09:00


     


 


 


  (Oramorph Sr)  30 mg  BID


 PO  18 09:00


     


 


 


  (Dilaudid Pf Inj)  0.5 mg  Q4H  PRN


 IV PUSH  18 23:00


    18 23:20


 


 


 Vancomycin HCl


 1000 mg/Sodium


 Chloride  250 ml @ 


 250 mls/hr  Q12H


 IV  18 00:00


 18 06:00  18 00:25


 


 


 Pharmacy Profile


 Note  0 ml @ 0


 mls/hr  UNSCH


 OTHER  18 23:00


     


 


 


 Piperacillin Sod/


 Tazobactam Sod  50 ml @ 


 100 mls/hr  Q6H


 IV  18 23:30


    18 00:25


 








Family History


Patient denies any family history


Social History


Tobacco use: 5 cigarettes a day


Alcohol use: Rarely


Illicit drug use: Cocaine





Physical Exam


Physical Exam


GENERAL: This is a well-nourished, well-developed patient, in no apparent 

distress.


SKIN:Multiple dry crusted lesions on bilateral feet and left elbow


HEAD: Atraumatic. Normocephalic. No temporal or scalp tenderness.


EYES: Pupils equal round and reactive. 


CARDIOVASCULAR: Regular rate and rhythm without murmurs, gallops, or rubs. 


RESPIRATORY: Clear to auscultation. Breath sounds equal bilaterally. No wheezes

, rales, or rhonchi.  


GASTROINTESTINAL: Abdomen soft, RUQ tenderness to palpation, nondistended. No 

hepato-splenomegaly, or palpable masses. No guarding.


MUSCULOSKELETAL: Extremities without clubbing, cyanosis, or edema. No joint 

tenderness, effusion, or edema noted. No calf tenderness. 


NEUROLOGICAL: Awake and alert.Five out of 5 muscle strength in all muscle 

groups.  Normal speech.





Caprini VTE Risk Assessment


Caprini VTE Risk Assessment:  No/Low Risk (score <= 1)


Caprini Risk Assessment Model











 Point Value = 1          Point Value = 2  Point Value = 3  Point Value = 5


 


Age 41-60


Minor surgery


BMI > 25 kg/m2


Swollen legs


Varicose veins


Pregnancy or postpartum


History of unexplained or recurrent


   spontaneous 


Oral contraceptives or hormone


   replacement


Sepsis (< 1 month)


Serious lung disease, including


   pneumonia (< 1 month)


Abnormal pulmonary function


Acute myocardial infarction


Congestive heart failure (< 1 month)


History of inflammatory bowel disease


Medical patient at bed rest Age 61-74


Arthroscopic surgery


Major open surgery (> 45 min)


Laparoscopic surgery (> 45 min)


Malignancy


Confined to bed (> 72 hours)


Immobilizing plaster cast


Central venous access Age >= 75


History of VTE


Family history of VTE


Factor V Leiden


Prothrombin 98239S


Lupus anticoagulant


Anticardiolipin antibodies


Elevated serum homocysteine


Heparin-induced thrombocytopenia


Other congenital or acquired


   thrombophilia Stroke (< 1 month)


Elective arthroplasty


Hip, pelvis, or leg fracture


Acute spinal cord injury (< 1 month)








Prophylaxis Regimen











   Total Risk


Factor Score Risk Level Prophylaxis Regimen


 


0-1      Low Early ambulation


 


2 Moderate Order ONE of the following:


*Sequential Compression Device (SCD)


*Heparin 5000 units SQ BID


 


3-4 Higher Order ONE of the following medications:


*Heparin 5000 units SQ TID


*Enoxaparin/Lovenox 40 mg SQ daily (WT < 150 kg, CrCl > 30 mL/min)


*Enoxaparin/Lovenox 30 mg SQ daily (WT < 150 kg, CrCl > 10-29 mL/min)


*Enoxaparin/Lovenox 30 mg SQ BID (WT < 150 kg, CrCl > 30 mL/min)


AND/OR


*Sequential Compression Device (SCD)


 


5 or more Highest Order ONE of the following medications:


*Heparin 5000 units SQ TID (Preferred with Epidurals)


*Enoxaparin/Lovenox 40 mg SQ daily (WT < 150 kg, CrCl > 30 mL/min)


*Enoxaparin/Lovenox 30 mg SQ daily (WT < 150 kg, CrCl > 10-29 mL/min)


*Enoxaparin/Lovenox 30 mg SQ BID (WT < 150 kg, CrCl > 30 mL/min)


AND


*Sequential Compression Device (SCD)











Assessment and Plan


Assessment and Plan


58 y/o male with a history of PTSD,CAD, aortic stenosis, bipolar, anxiety, 

depression and chronic pain presented to the ED with complaints of abdominal 

pain and rectal bleeding. 





GI bleed, hgb stable 14.1


Occult positive in ED


   -Protonix IV


   -Consult GI for recommendations


   -CBC in AM, transfuse if needed


   -NPO, IVF





Abdominal pain


Abdominal CT reviewed and is unremarkable


   -Gallbladder US ordered


   -Pain management with IV Dilaudid





Leukocytosis, wbc 15.2, Multiple skin lesions, history of MRSA


   -Vanco and zosyn IV, deescalate in AM


   -Bacitracin to wounds





Chronic pain


   -Resume home medications





PTSD, Anxiety, depression


   -Resume home medications





DVT prophylaxis: SCDs


Discussed Condition With


patient and rn











Jackie Verma 2018 22:56

## 2018-06-07 VITALS
OXYGEN SATURATION: 94 % | SYSTOLIC BLOOD PRESSURE: 140 MMHG | RESPIRATION RATE: 18 BRPM | TEMPERATURE: 98.7 F | DIASTOLIC BLOOD PRESSURE: 89 MMHG | HEART RATE: 56 BPM

## 2018-06-07 VITALS
TEMPERATURE: 98.3 F | SYSTOLIC BLOOD PRESSURE: 123 MMHG | OXYGEN SATURATION: 96 % | DIASTOLIC BLOOD PRESSURE: 84 MMHG | HEART RATE: 54 BPM | RESPIRATION RATE: 20 BRPM

## 2018-06-07 VITALS
DIASTOLIC BLOOD PRESSURE: 83 MMHG | OXYGEN SATURATION: 96 % | RESPIRATION RATE: 21 BRPM | TEMPERATURE: 98.4 F | HEART RATE: 88 BPM | SYSTOLIC BLOOD PRESSURE: 133 MMHG

## 2018-06-07 VITALS
DIASTOLIC BLOOD PRESSURE: 70 MMHG | TEMPERATURE: 98.4 F | SYSTOLIC BLOOD PRESSURE: 111 MMHG | HEART RATE: 76 BPM | RESPIRATION RATE: 18 BRPM | OXYGEN SATURATION: 97 %

## 2018-06-07 VITALS
DIASTOLIC BLOOD PRESSURE: 84 MMHG | RESPIRATION RATE: 19 BRPM | OXYGEN SATURATION: 98 % | TEMPERATURE: 98.2 F | HEART RATE: 76 BPM | SYSTOLIC BLOOD PRESSURE: 132 MMHG

## 2018-06-07 LAB
BASOPHILS # BLD AUTO: 0 TH/MM3 (ref 0–0.2)
BASOPHILS NFR BLD: 0 % (ref 0–2)
EOSINOPHIL # BLD: 0 TH/MM3 (ref 0–0.4)
EOSINOPHIL NFR BLD: 0 % (ref 0–4)
ERYTHROCYTE [DISTWIDTH] IN BLOOD BY AUTOMATED COUNT: 13.5 % (ref 11.6–17.2)
HCT VFR BLD CALC: 37.3 % (ref 39–51)
HCT VFR BLD CALC: 37.5 % (ref 39–51)
HCT VFR BLD CALC: 37.6 % (ref 39–51)
HCT VFR BLD CALC: 40.9 % (ref 39–51)
HGB BLD-MCNC: 12.5 GM/DL (ref 13–17)
HGB BLD-MCNC: 12.5 GM/DL (ref 13–17)
HGB BLD-MCNC: 12.7 GM/DL (ref 13–17)
HGB BLD-MCNC: 13.5 GM/DL (ref 13–17)
LYMPHOCYTES # BLD AUTO: 1.1 TH/MM3 (ref 1–4.8)
LYMPHOCYTES NFR BLD AUTO: 9.9 % (ref 9–44)
MCH RBC QN AUTO: 30.9 PG (ref 27–34)
MCHC RBC AUTO-ENTMCNC: 33.3 % (ref 32–36)
MCV RBC AUTO: 92.9 FL (ref 80–100)
MONOCYTE #: 1 TH/MM3 (ref 0–0.9)
MONOCYTES NFR BLD: 8.7 % (ref 0–8)
NEUTROPHILS # BLD AUTO: 9.2 TH/MM3 (ref 1.8–7.7)
NEUTROPHILS NFR BLD AUTO: 81.4 % (ref 16–70)
PLATELET # BLD: 302 TH/MM3 (ref 150–450)
PMV BLD AUTO: 9.1 FL (ref 7–11)
RBC # BLD AUTO: 4.05 MIL/MM3 (ref 4.5–5.9)
WBC # BLD AUTO: 11.3 TH/MM3 (ref 4–11)

## 2018-06-07 RX ADMIN — TAZOBACTAM SODIUM AND PIPERACILLIN SODIUM SCH MLS/HR: 375; 3 INJECTION, SOLUTION INTRAVENOUS at 00:25

## 2018-06-07 RX ADMIN — HYDROMORPHONE HYDROCHLORIDE PRN MG: 2 INJECTION INTRAMUSCULAR; INTRAVENOUS; SUBCUTANEOUS at 08:22

## 2018-06-07 RX ADMIN — HYDROMORPHONE HYDROCHLORIDE PRN MG: 2 INJECTION INTRAMUSCULAR; INTRAVENOUS; SUBCUTANEOUS at 03:29

## 2018-06-07 RX ADMIN — Medication SCH ML: at 19:54

## 2018-06-07 RX ADMIN — BACITRACIN SCH APPLIC: 500 OINTMENT TOPICAL at 11:48

## 2018-06-07 RX ADMIN — MORPHINE SULFATE SCH MG: 30 TABLET, EXTENDED RELEASE ORAL at 08:21

## 2018-06-07 RX ADMIN — HYDROMORPHONE HYDROCHLORIDE PRN MG: 2 INJECTION INTRAMUSCULAR; INTRAVENOUS; SUBCUTANEOUS at 12:30

## 2018-06-07 RX ADMIN — SULFAMETHOXAZOLE AND TRIMETHOPRIM SCH TAB: 800; 160 TABLET ORAL at 19:55

## 2018-06-07 RX ADMIN — TAZOBACTAM SODIUM AND PIPERACILLIN SODIUM SCH MLS/HR: 375; 3 INJECTION, SOLUTION INTRAVENOUS at 05:30

## 2018-06-07 RX ADMIN — MORPHINE SULFATE SCH MG: 30 TABLET, EXTENDED RELEASE ORAL at 19:55

## 2018-06-07 RX ADMIN — Medication SCH ML: at 00:26

## 2018-06-07 RX ADMIN — HYDROMORPHONE HYDROCHLORIDE PRN MG: 2 INJECTION INTRAMUSCULAR; INTRAVENOUS; SUBCUTANEOUS at 19:56

## 2018-06-07 RX ADMIN — PANTOPRAZOLE SODIUM SCH MG: 40 INJECTION, POWDER, FOR SOLUTION INTRAVENOUS at 08:21

## 2018-06-07 RX ADMIN — DULOXETINE SCH MG: 60 CAPSULE, DELAYED RELEASE ORAL at 08:21

## 2018-06-07 RX ADMIN — Medication SCH ML: at 08:22

## 2018-06-07 RX ADMIN — PHENYTOIN SODIUM SCH MLS/HR: 50 INJECTION INTRAMUSCULAR; INTRAVENOUS at 16:48

## 2018-06-07 RX ADMIN — TAZOBACTAM SODIUM AND PIPERACILLIN SODIUM SCH MLS/HR: 375; 3 INJECTION, SOLUTION INTRAVENOUS at 11:47

## 2018-06-07 RX ADMIN — HYDROMORPHONE HYDROCHLORIDE PRN MG: 2 INJECTION INTRAMUSCULAR; INTRAVENOUS; SUBCUTANEOUS at 23:00

## 2018-06-07 RX ADMIN — BACITRACIN SCH APPLIC: 500 OINTMENT TOPICAL at 19:57

## 2018-06-07 RX ADMIN — HYDROMORPHONE HYDROCHLORIDE PRN MG: 2 INJECTION INTRAMUSCULAR; INTRAVENOUS; SUBCUTANEOUS at 16:48

## 2018-06-07 NOTE — RADRPT
EXAM DATE:  6/7/2018 9:26 AM EDT

AGE/SEX:        57 years / Male



INDICATIONS:  Right upper quadrant pain. 



CLINICAL DATA:  This is the patient's subsequent encounter. Patient reports that signs and/or symptom
s have been present for 1 day and indicates a pain score of 8/10. 



MEDICAL/SURGICAL HISTORY:   . Aortic stenosis.  Hypertension.  Kidney stones.  BPH.  . Neck surgeries
.  Cardiac catheterization.



COMPARISON:      [facility], CT ABDOMEN & PELVIS W CONTRAST, 6/6/2018.  . 





MEASUREMENTS (cm x cm x cm): 

Liver:__  15.5  cm length

Common Bile Duct:__  5mm 



FINDINGS:

Liver:  Normal echotexture without focal lesion or ductal dilatation.

Portal Vein: Hepatopedal flow seen in portal vein.

Common Duct:  No intraluminal mass or stone visualized.

Gallbladder:  Demonstrates no wall thickening or pericholecystic fluid. No stones visualized. 

Pancreas:  Not well visualized.

Right Kidney:  No mass or hydronephrosis

Other: None.



CONCLUSION:

1.  No abnormality is identified to explain the right upper quadrant pain. There are no gallstones.

2.  Please note that the pancreas is not well visualized on this examination. However, it demonstrate
d no abnormality on yesterday's CT.







Electronically signed by: Giles Hilliard MD  6/7/2018 9:31 AM EDT

## 2018-06-07 NOTE — HHI.PR
Subjective


Remarks


Follow-up GI bleed and right upper quadrant abdominal pain.  Patient seen and 

examined, lying in bed with continued complaints of abdominal pain.  Denies any 

bowel movements, black or bloody stool.  Hemoglobin stable.  Vital signs 

stable.  Afebrile.





Objective


Vitals





Vital Signs








  Date Time  Temp Pulse Resp B/P (MAP) Pulse Ox O2 Delivery O2 Flow Rate FiO2


 


6/7/18 08:00 98.3 54 20 123/84 (97) 96   


 


6/7/18 03:45 98.2 76 19 132/84 (100) 98   


 


6/7/18 00:30 98.4 88 21 133/83 (100) 96   


 


6/6/18 21:04 98.2 68 22 196/111 (139) 95   














I/O      


 


 6/6/18 6/6/18 6/6/18 6/7/18 6/7/18 6/7/18





 07:00 15:00 23:00 07:00 15:00 23:00


 


Intake Total    720 ml  


 


Balance    720 ml  


 


      


 


Intake Oral    720 ml  


 


# Voids    2  


 


# Bowel Movements    0  








Result Diagram:  


6/7/18 0420





Imaging





Last Impressions








Gall Bladder Ultrasound 6/7/18 0000 Signed





Impressions: 





 CONCLUSION:





 1.  No abnormality is identified to explain the right upper quadrant pain. Ther





 e are no gallstones.





 2.  Please note that the pancreas is not well visualized on this examination. H





 owever, it demonstrated no abnormality on yesterday's CT.





  





 





  





 





  





 








Objective Remarks


GENERAL: This is a well-nourished, well-developed patient, in no apparent 

distress.


SKIN:Multiple dry crusted lesions on bilateral feet and left elbow


HEAD: Atraumatic. Normocephalic. No temporal or scalp tenderness.


EYES: Pupils equal round and reactive. 


CARDIOVASCULAR: Regular rate and rhythm without murmurs, gallops, or rubs. 


RESPIRATORY: Clear to auscultation. Breath sounds equal bilaterally. No wheezes

, rales, or rhonchi.  


GASTROINTESTINAL: Abdomen soft, RUQ tenderness to palpation, nondistended. No 

hepato-splenomegaly, or palpable masses. No guarding.


MUSCULOSKELETAL: Extremities without clubbing, cyanosis, or edema. No joint 

tenderness, effusion, or edema noted. No calf tenderness. 


NEUROLOGICAL: Awake and alert.Five out of 5 muscle strength in all muscle 

groups.  Normal speech.





A/P


Assessment and Plan


56 y/o male with a history of PTSD,CAD, aortic stenosis, bipolar, anxiety, 

depression and chronic pain presented to the ED with complaints of abdominal 

pain and rectal bleeding. 





Rectal bleeding


With associated complaints of right upper quadrant abdominal pain and dark 

black stools


   - Abdominal CT reviewed and unremarkable.  Gallbladder ultrasound showing no 

abnormality.  No gallstones.


   - Occult positive in ED


   - Continue Protonix IV


   - Gastroenterology following, appreciate input recommendations.  For now 

will monitor symptoms.


   - Consider HIDA scan if no improvement in right upper quadrant pain.


   - Trend H&H.  Stable at this time.


   - Check stool studies.  Follow.


   - Pain control, Dilaudid IV for breakthrough pain, scheduled Oramorph.


   - Okay for regular diet. Assess toleration.


   - Supportive care.





Leukocytosis, trending down 


Multiple skin lesions


History of MRSA


   - WBC 15.2, now 11.3.  Continue to monitor.  Patient is afebrile.


   - Wounds are not draining.  Continue bacitracin.


   - Will DC IV Zosyn and Vanco.  Place on Bactrim.  Monitor kidney function.  

Labs ordered for am.





Chronic pain


   - Resume home medications





PTSD, Anxiety, depression


   - Resume home medications





DVT prophylaxis: SCDs











Malu Bryant Jun 7, 2018 10:46

## 2018-06-07 NOTE — PD.CONS
HPI


History of Present Illness


This is a 57 year old M with PMH significant for HTN, aortic stenosis, PTSD, 

bipolar disorder, and anxiety. Pt presented to the ER yesterday with complaints 

of RUQ abdominal pain and intermittent bloody stools for the past six months. 

States RUQ pain began two days ago, is intermittent, is unable to identify any 

aggravating or alleviating factors.  Denies nausea and vomiting. Reports 

intermittent bloody stools for the past six months, approximately one episode a 

week, sometimes blood is mixed into stool and sometimes he passes mostly stool. 

States in between bloody stools he has constipation, which he takes Dulcolax 

for as needed. Of note, is on chronic narcotics.  Pt was worked up for reports 

of rectal bleeding by our service in March, EGD and colonoscopy revealed 

gastritis, diverticulosis and internal hemorrhoids, states has not followed up 

in the office since discharge. Pt also reports an unintentional weight loss of 

approximately 30 pounds over the past year. Reports rare ETOH. Smokes 4-5 

cigarettes but states not daily. Also admits to cocaine use, last use was a 

week ago.  Of note, takes Diclofenac for chronic pain.  Family history 

significant for colon cancer, paternal grandmother and thinks maternal 

grandmother as well. 


 (Josselin Moreno)





PFSH


Past Medical History


PTSD


Anxiety


Depression 


Chronic pain


Gastritis


Diverticulosis


Internal hemorrhoids


Past Surgical History


Cervical fusion x 3


right hip replacement 


right knee arthroscopy


EGD


Colonoscopy 


 (Josselin Moreno)


Coded Allergies:  


     phenobarbital (Verified  Allergy, Unknown, unknown, 6/6/18)


Family History


Patient denies any family history


Social History


Tobacco use: 5 cigarettes a day- denies cigarette use every day


Alcohol use: Rarely


Illicit drug use: Cocaine- last use a week ago 


 (Josselin Moreno)





Review of Systems


Gastrointestinal:  COMPLAINS OF: Abdominal pain, Bloody stools, Constipation, 

DENIES: Black stools, Diarrhea, Nausea, Vomiting, Difficulty Swallowing, 

Odynophagia, Swelling of Abdomen, Heartburn, Hematemesis (Josselin Moreno)





GI Exam


Vitals I&O





Vital Signs








  Date Time  Temp Pulse Resp B/P (MAP) Pulse Ox O2 Delivery O2 Flow Rate FiO2


 


6/7/18 12:00 98.7 56 18 140/89 (106) 94   


 


6/7/18 08:00 98.3 54 20 123/84 (97) 96   


 


6/7/18 03:45 98.2 76 19 132/84 (100) 98   


 


6/7/18 00:30 98.4 88 21 133/83 (100) 96   


 


6/6/18 21:04 98.2 68 22 196/111 (139) 95   














I/O      


 


 6/6/18 6/6/18 6/6/18 6/7/18 6/7/18 6/7/18





 07:00 15:00 23:00 07:00 15:00 23:00


 


Intake Total    720 ml  


 


Balance    720 ml  


 


      


 


Intake Oral    720 ml  


 


# Voids    2  


 


# Bowel Movements    0  








Imaging





Last Impressions








Gall Bladder Ultrasound 6/7/18 0000 Signed





Impressions: 





 CONCLUSION:





 1.  No abnormality is identified to explain the right upper quadrant pain. Ther





 e are no gallstones.





 2.  Please note that the pancreas is not well visualized on this examination. H





 owever, it demonstrated no abnormality on yesterday's CT.





  





 





  





 





  





 








Laboratory











Test


  6/7/18


04:20 6/7/18


12:03


 


White Blood Count 11.3 TH/MM3  


 


Red Blood Count 4.05 MIL/MM3  


 


Hemoglobin 12.5 GM/DL  


 


Hematocrit 37.6 %  


 


Mean Corpuscular Volume 92.9 FL  


 


Mean Corpuscular Hemoglobin 30.9 PG  


 


Mean Corpuscular Hemoglobin


Concent 33.3 % 


  


 


 


Red Cell Distribution Width 13.5 %  


 


Platelet Count 302 TH/MM3  


 


Mean Platelet Volume 9.1 FL  


 


Neutrophils (%) (Auto) 81.4 %  


 


Lymphocytes (%) (Auto) 9.9 %  


 


Monocytes (%) (Auto) 8.7 %  


 


Eosinophils (%) (Auto) 0.0 %  


 


Basophils (%) (Auto) 0.0 %  


 


Neutrophils # (Auto) 9.2 TH/MM3  


 


Lymphocytes # (Auto) 1.1 TH/MM3  


 


Monocytes # (Auto) 1.0 TH/MM3  


 


Eosinophils # (Auto) 0.0 TH/MM3  


 


Basophils # (Auto) 0.0 TH/MM3  


 


CBC Comment DIFF FINAL  


 


Differential Comment   








Physical Examination


HEENT: Normocephalic; atraumatic


CHEST:  Even/unlabored 


CARDIAC:  RRR


ABDOMEN:  Soft, nondistended, RUQ tenderness, bowel sounds active 


EXTREMITIES: No clubbing, cyanosis, or edema.


CNS:  And oriented times three.


 (Josselin Moreno)





Assessment and Plan


Plan


Assessment:


- Rectal bleeding- states intermittent for the past six months, approximately


  one episode of rectal bleeding a week- sometimes blood is mixed in stools


  and sometimes passes mostly just blood- previously evaluated by our 


  service for same complaint in March, EGD and colonoscopy --> Gastritis,


  diverticulosis and internal hemorrhoids. Did not follow up with GI after DC.


  H/H currently 12.7/37.5 was 12.7/36.8 on 3/27 during previous admission


- RUQ pain- started 2 days ago, intermittent- denies any aggravating or 


  alleviating factors. Denies nausea, vomiting.


  Gallbladder US (6/7) No abnormality is identified to explain the right upper 


  quadrant pain. There are no gallstones.


  CT abdomen and pelvis W IV contrast --> No acute finding is identified to 


  explain the clinical symptoms. Nonacute findings include mild atherosclerotic 


  disease as well as hepatic and renal cysts.





Plan:


At this time will monitor symptoms


Consider HIDA scan if no improvement in RUQ pain


Had work up in March, will need capsule endoscopy outpatient 


Stool studies


Protonix


OK for regular diet


Further recommendations based on clinical course





Pt has been seen and examined by myself and Dr. Ayala and this note is written 

on his behalf 


 (Josselin Moreno)


Physician Comments


Seen and examined ,plan as above.


Will follow up with you for further plans.


Thank you for the consult.


 (Giancarlo Ayala MD)











Josselin Moreno Jun 7, 2018 12:52


Giancarlo Ayala MD Jun 8, 2018 09:36

## 2018-06-08 VITALS
DIASTOLIC BLOOD PRESSURE: 71 MMHG | HEART RATE: 61 BPM | RESPIRATION RATE: 18 BRPM | TEMPERATURE: 98 F | SYSTOLIC BLOOD PRESSURE: 140 MMHG | OXYGEN SATURATION: 95 %

## 2018-06-08 VITALS
DIASTOLIC BLOOD PRESSURE: 70 MMHG | TEMPERATURE: 98 F | RESPIRATION RATE: 18 BRPM | HEART RATE: 73 BPM | OXYGEN SATURATION: 96 % | SYSTOLIC BLOOD PRESSURE: 118 MMHG

## 2018-06-08 VITALS
OXYGEN SATURATION: 95 % | DIASTOLIC BLOOD PRESSURE: 79 MMHG | HEART RATE: 58 BPM | RESPIRATION RATE: 18 BRPM | SYSTOLIC BLOOD PRESSURE: 130 MMHG | TEMPERATURE: 98 F

## 2018-06-08 VITALS
HEART RATE: 62 BPM | RESPIRATION RATE: 16 BRPM | TEMPERATURE: 97.8 F | SYSTOLIC BLOOD PRESSURE: 114 MMHG | OXYGEN SATURATION: 95 % | DIASTOLIC BLOOD PRESSURE: 75 MMHG

## 2018-06-08 VITALS
OXYGEN SATURATION: 98 % | TEMPERATURE: 98.1 F | SYSTOLIC BLOOD PRESSURE: 112 MMHG | RESPIRATION RATE: 18 BRPM | DIASTOLIC BLOOD PRESSURE: 74 MMHG | HEART RATE: 78 BPM

## 2018-06-08 LAB
ALBUMIN SERPL-MCNC: 2.8 GM/DL (ref 3.4–5)
ALP SERPL-CCNC: 84 U/L (ref 45–117)
ALT SERPL-CCNC: 23 U/L (ref 12–78)
AST SERPL-CCNC: 18 U/L (ref 15–37)
BASOPHILS # BLD AUTO: 0 TH/MM3 (ref 0–0.2)
BASOPHILS NFR BLD: 0.3 % (ref 0–2)
BILIRUB INDIRECT SERPL-MCNC: 0.1 MG/DL (ref 0–0.8)
BILIRUB SERPL-MCNC: 0.2 MG/DL (ref 0.2–1)
BUN SERPL-MCNC: 20 MG/DL (ref 7–18)
CALCIUM SERPL-MCNC: 7.9 MG/DL (ref 8.5–10.1)
CHLORIDE SERPL-SCNC: 106 MEQ/L (ref 98–107)
CREAT SERPL-MCNC: 1.18 MG/DL (ref 0.6–1.3)
DIRECT BILIRUBIN ADULT: 0.1 MG/DL (ref 0–0.2)
EOSINOPHIL # BLD: 0.1 TH/MM3 (ref 0–0.4)
EOSINOPHIL NFR BLD: 1.2 % (ref 0–4)
ERYTHROCYTE [DISTWIDTH] IN BLOOD BY AUTOMATED COUNT: 14 % (ref 11.6–17.2)
GFR SERPLBLD BASED ON 1.73 SQ M-ARVRAT: 63 ML/MIN (ref 89–?)
GLUCOSE SERPL-MCNC: 105 MG/DL (ref 74–106)
HCO3 BLD-SCNC: 24.2 MEQ/L (ref 21–32)
HCT VFR BLD CALC: 40.3 % (ref 39–51)
HGB BLD-MCNC: 13.4 GM/DL (ref 13–17)
LYMPHOCYTES # BLD AUTO: 2.3 TH/MM3 (ref 1–4.8)
LYMPHOCYTES NFR BLD AUTO: 28.3 % (ref 9–44)
MCH RBC QN AUTO: 31.2 PG (ref 27–34)
MCHC RBC AUTO-ENTMCNC: 33.2 % (ref 32–36)
MCV RBC AUTO: 93.9 FL (ref 80–100)
MONOCYTE #: 1 TH/MM3 (ref 0–0.9)
MONOCYTES NFR BLD: 12.6 % (ref 0–8)
NEUTROPHILS # BLD AUTO: 4.7 TH/MM3 (ref 1.8–7.7)
NEUTROPHILS NFR BLD AUTO: 57.6 % (ref 16–70)
PLATELET # BLD: 328 TH/MM3 (ref 150–450)
PMV BLD AUTO: 8.9 FL (ref 7–11)
PROT SERPL-MCNC: 6.1 GM/DL (ref 6.4–8.2)
RBC # BLD AUTO: 4.29 MIL/MM3 (ref 4.5–5.9)
SODIUM SERPL-SCNC: 140 MEQ/L (ref 136–145)
WBC # BLD AUTO: 8.2 TH/MM3 (ref 4–11)

## 2018-06-08 RX ADMIN — HYDROMORPHONE HYDROCHLORIDE PRN MG: 2 INJECTION INTRAMUSCULAR; INTRAVENOUS; SUBCUTANEOUS at 21:13

## 2018-06-08 RX ADMIN — HYDROMORPHONE HYDROCHLORIDE PRN MG: 2 INJECTION INTRAMUSCULAR; INTRAVENOUS; SUBCUTANEOUS at 14:45

## 2018-06-08 RX ADMIN — SULFAMETHOXAZOLE AND TRIMETHOPRIM SCH TAB: 800; 160 TABLET ORAL at 19:59

## 2018-06-08 RX ADMIN — DULOXETINE SCH MG: 60 CAPSULE, DELAYED RELEASE ORAL at 08:22

## 2018-06-08 RX ADMIN — HYDROMORPHONE HYDROCHLORIDE PRN MG: 2 INJECTION INTRAMUSCULAR; INTRAVENOUS; SUBCUTANEOUS at 05:20

## 2018-06-08 RX ADMIN — Medication SCH ML: at 08:23

## 2018-06-08 RX ADMIN — MORPHINE SULFATE SCH MG: 30 TABLET, EXTENDED RELEASE ORAL at 20:00

## 2018-06-08 RX ADMIN — HYDROMORPHONE HYDROCHLORIDE PRN MG: 2 INJECTION INTRAMUSCULAR; INTRAVENOUS; SUBCUTANEOUS at 11:14

## 2018-06-08 RX ADMIN — SULFAMETHOXAZOLE AND TRIMETHOPRIM SCH TAB: 800; 160 TABLET ORAL at 08:35

## 2018-06-08 RX ADMIN — BACITRACIN SCH APPLIC: 500 OINTMENT TOPICAL at 20:02

## 2018-06-08 RX ADMIN — WATER SCH ML: 1 IRRIGANT IRRIGATION at 17:37

## 2018-06-08 RX ADMIN — BACITRACIN SCH APPLIC: 500 OINTMENT TOPICAL at 08:23

## 2018-06-08 RX ADMIN — Medication PRN ML: at 21:13

## 2018-06-08 RX ADMIN — PANTOPRAZOLE SODIUM SCH MG: 40 INJECTION, POWDER, FOR SOLUTION INTRAVENOUS at 08:22

## 2018-06-08 RX ADMIN — DOCUSATE SODIUM SCH MG: 100 CAPSULE, LIQUID FILLED ORAL at 20:00

## 2018-06-08 RX ADMIN — POTASSIUM CHLORIDE SCH MLS/HR: 200 INJECTION, SOLUTION INTRAVENOUS at 11:14

## 2018-06-08 RX ADMIN — HYDROMORPHONE HYDROCHLORIDE PRN MG: 2 INJECTION INTRAMUSCULAR; INTRAVENOUS; SUBCUTANEOUS at 02:09

## 2018-06-08 RX ADMIN — HYDROMORPHONE HYDROCHLORIDE PRN MG: 2 INJECTION INTRAMUSCULAR; INTRAVENOUS; SUBCUTANEOUS at 08:24

## 2018-06-08 RX ADMIN — PHENYTOIN SODIUM SCH MLS/HR: 50 INJECTION INTRAMUSCULAR; INTRAVENOUS at 15:34

## 2018-06-08 RX ADMIN — Medication SCH ML: at 19:59

## 2018-06-08 RX ADMIN — POTASSIUM CHLORIDE SCH MLS/HR: 200 INJECTION, SOLUTION INTRAVENOUS at 14:51

## 2018-06-08 RX ADMIN — HYDROMORPHONE HYDROCHLORIDE PRN MG: 2 INJECTION INTRAMUSCULAR; INTRAVENOUS; SUBCUTANEOUS at 17:36

## 2018-06-08 RX ADMIN — MORPHINE SULFATE SCH MG: 30 TABLET, EXTENDED RELEASE ORAL at 08:23

## 2018-06-08 NOTE — HHI.PR
Subjective


Remarks


Patient has not yet had a bowel movement.  He still complains of abdominal 

pain.  HIDA scan pending.





Objective





Vital Signs








  Date Time  Temp Pulse Resp B/P (MAP) Pulse Ox O2 Delivery O2 Flow Rate FiO2


 


6/8/18 12:00 98.0 61 18 140/71 (94) 95   


 


6/8/18 09:41      Room Air  


 


6/8/18 08:00 97.8 62 16 114/75 (88) 95   


 


6/8/18 05:54   18     


 


6/8/18 00:51 98.1 78 18 112/74 (87) 98   


 


6/7/18 23:33      Room Air  


 


6/7/18 20:48   18     


 


6/7/18 19:18 98.4 76 18 111/70 (84) 97   














I/O      


 


 6/7/18 6/7/18 6/7/18 6/8/18 6/8/18 6/8/18





 07:00 15:00 23:00 07:00 15:00 23:00


 


Intake Total 720 ml 565 ml 500 ml 720 ml  


 


Balance 720 ml 565 ml 500 ml 720 ml  


 


      


 


Intake Oral 720 ml  500 ml 720 ml  


 


IV Total  565 ml    


 


# Voids 2  3 2  


 


# Bowel Movements 0   0  








Result Diagram:  


6/8/18 0700                                                                    

            6/8/18 0700





Objective Remarks


GENERAL: NAD, A&Ox3


HEAD: Normocephalic. 


NECK: Supple, trachea midline. No lymphadenopathy.


EYES: No scleral icterus. No injection or drainage. 


CARDIOVASCULAR: Regular rate and rhythm without murmurs, gallops, or rubs. 


RESPIRATORY: Breath sounds equal bilaterally. No accessory muscle use.


GASTROINTESTINAL: Abdomen soft, non-tender, nondistended. 


MUSCULOSKELETAL: No cyanosis, or edema. 


SKIN: Warm and dry.


NEURO:  No focal neurological deficitis.





A/P


Problem List:  


(1) Abdominal pain


ICD Code:  R10.9 - Unspecified abdominal pain


(2) GI bleed


ICD Code:  K92.2 - Gastrointestinal hemorrhage, unspecified


Assessment and Plan


56 y/o male with abdominal pain and rectal bleeding. 





Rectal bleeding


Abdominal pain


HIDA scan ordered


Other workup has been negative


Alternative etiology could be an inflammatory bowel disease


GI following





Leukocytosis


Downward trend


Follow CBC





Multiple skin lesions


History of MRSA


No signs of acute infection


Continue topical Bactroban for lesions


Standard isolation precautions





Chronic pain


PTSD


Anxiety disorder


Depression


Continue home medications





DVT prophylaxis


Lawrence Highel C MD Jun 8, 2018 14:38

## 2018-06-08 NOTE — HHI.GIFU
Subjective


Remarks


Patient is resting in the bed


Still complains of right upper quadrant tenderness to light palpation


Current hemoglobin 13.4


Acute on chronic constipation probably related to chronic pain meds


 (Shoshana Blunt)





Objective


Vitals I&O





Vital Signs








  Date Time  Temp Pulse Resp B/P (MAP) Pulse Ox O2 Delivery O2 Flow Rate FiO2


 


6/8/18 12:00 98.0 61 18 140/71 (94) 95   


 


6/8/18 09:41      Room Air  


 


6/8/18 08:00 97.8 62 16 114/75 (88) 95   


 


6/8/18 05:54   18     


 


6/8/18 00:51 98.1 78 18 112/74 (87) 98   


 


6/7/18 23:33      Room Air  


 


6/7/18 20:48   18     


 


6/7/18 19:18 98.4 76 18 111/70 (84) 97   














I/O      


 


 6/7/18 6/7/18 6/7/18 6/8/18 6/8/18 6/8/18





 07:00 15:00 23:00 07:00 15:00 23:00


 


Intake Total 720 ml 565 ml 500 ml 720 ml 100 ml 


 


Balance 720 ml 565 ml 500 ml 720 ml 100 ml 


 


      


 


Intake Oral 720 ml  500 ml 720 ml  


 


IV Total  565 ml   100 ml 


 


# Voids 2  3 2  


 


# Bowel Movements 0   0  








Laboratory





Laboratory Tests








Test


  6/7/18


16:56 6/7/18


22:56 6/8/18


07:00 6/8/18


14:00


 


Hemoglobin 13.5  12.5  13.4  


 


Hematocrit 40.9  37.3  40.3  


 


White Blood Count   8.2  


 


Red Blood Count   4.29  


 


Mean Corpuscular Volume   93.9  


 


Mean Corpuscular Hemoglobin   31.2  


 


Mean Corpuscular Hemoglobin


Concent 


  


  33.2 


  


 


 


Red Cell Distribution Width   14.0  


 


Platelet Count   328  


 


Mean Platelet Volume   8.9  


 


Neutrophils (%) (Auto)   57.6  


 


Lymphocytes (%) (Auto)   28.3  


 


Monocytes (%) (Auto)   12.6  


 


Eosinophils (%) (Auto)   1.2  


 


Basophils (%) (Auto)   0.3  


 


Neutrophils # (Auto)   4.7  


 


Lymphocytes # (Auto)   2.3  


 


Monocytes # (Auto)   1.0  


 


Eosinophils # (Auto)   0.1  


 


Basophils # (Auto)   0.0  


 


CBC Comment   DIFF FINAL  


 


Differential Comment     


 


Blood Urea Nitrogen   20  


 


Creatinine   1.18  


 


Random Glucose   105  


 


Calcium Level   7.9  


 


Sodium Level   140  


 


Potassium Level   3.4  


 


Chloride Level   106  


 


Carbon Dioxide Level   24.2  


 


Anion Gap   10  


 


Estimat Glomerular Filtration


Rate 


  


  63 


  


 


 


Total Bilirubin    0.2 


 


Direct Bilirubin    0.1 


 


Indirect Bilirubin    0.1 


 


Aspartate Amino Transf


(AST/SGOT) 


  


  


  18 


 


 


Alanine Aminotransferase


(ALT/SGPT) 


  


  


  23 


 


 


Alkaline Phosphatase    84 


 


Total Protein    6.1 


 


Albumin    2.8 


 


Amylase Level    75 


 


Lipase    375 








Imaging





Last Impressions








Gall Bladder Ultrasound 6/7/18 0000 Signed





Impressions: 





 CONCLUSION:





 1.  No abnormality is identified to explain the right upper quadrant pain. Ther





 e are no gallstones.





 2.  Please note that the pancreas is not well visualized on this examination. H





 owever, it demonstrated no abnormality on yesterday's CT.





  





 





  





 





  





 








Physical Exam


HEENT: normocephalic; atraumatic; no jaundice. 


NECK: Neck is supple, no JVD, no lymphadenopathy.


CHEST:  Chest is clear to auscultation and percussion.  No obvious rhonchi


CARDIAC:  Regular rate and rhythm


ABDOMEN:  Soft, nondistended, right upper quadrant discomfort to light 

palpation no hepatosplenomegaly; bowel sounds are present in all four quadrants.


EXTREMITIES: No clubbing, cyanosis, or edema.


SKIN:  Normal; no rash; no jaundice.


CNS: Mild stutter, answers questions appropriately


 (Shoshana Blunt)





Assessment and Plan


Plan


Assessment:


- Rectal bleeding- states intermittent for the past six months, approximately


  one episode of rectal bleeding a week- sometimes blood is mixed in stools


  and sometimes passes mostly just blood- previously evaluated by our 


  service for same complaint in March, EGD and colonoscopy --> Gastritis,


  diverticulosis and internal hemorrhoids. Did not follow up with GI after DC.


  H/H currently 12.7/37.5 was 12.7/36.8 on 3/27 during previous admission


- RUQ pain- started 2 days ago, intermittent- denies any aggravating or 


  alleviating factors. Denies nausea, vomiting.


  Gallbladder US (6/7) No abnormality is identified to explain the right upper 


  quadrant pain. There are no gallstones.


  CT abdomen and pelvis W IV contrast --> No acute finding is identified to 


  explain the clinical symptoms. Nonacute findings include mild atherosclerotic 


  disease as well as hepatic and renal cysts.





6/8/2018 patient still complains of some right upper quadrant discomfort to 

light palpation.  Ultrasound and CT scan unremarkable no gallstones noted 

patient does note acute on chronic constipation and does take chronic pain 

meds.  Notes last EGD and colon done March 2018 results above.  Current 

hemoglobin 13.4 no obvious bleeding no rectal bleeding but does still note 

constipation.  States MiraLAX ineffective will trial lactulose but if patient 

has chronic pain meds may need Linzess in the future.  Encourage patient not to 

strain and to wipe very gently after bowel movement.  Discussed using baby 

wipes. currently right upper quadrant pain workup negative.,  But patient does 

note chronic back pain.  Encouraged some increase activity in the room.  No 

stool studies so far.





Plan:


Diet, regular


Add daily lactulose


PPI


Monitor labs


capsule endoscopy outpatient , patient will need to follow-up with GI after 

discharge


Further recommendations based on clinical course





Pt has been seen and examined by myself and Dr. Ayala and this note is written 

on his behalf 


 (Shoshana Blunt)


Physician Comments


Seen with Valeri, further testing requested, will check results.


Further recommendations to follow.


 (Giancarlo Ayala MD)











Shoshana Blunt Jun 8, 2018 16:20


Giancarlo Ayala MD Jun 9, 2018 12:07

## 2018-06-09 VITALS
RESPIRATION RATE: 16 BRPM | DIASTOLIC BLOOD PRESSURE: 86 MMHG | TEMPERATURE: 98 F | SYSTOLIC BLOOD PRESSURE: 152 MMHG | OXYGEN SATURATION: 95 % | HEART RATE: 69 BPM

## 2018-06-09 VITALS
OXYGEN SATURATION: 94 % | TEMPERATURE: 97.7 F | DIASTOLIC BLOOD PRESSURE: 77 MMHG | SYSTOLIC BLOOD PRESSURE: 115 MMHG | HEART RATE: 54 BPM | RESPIRATION RATE: 18 BRPM

## 2018-06-09 VITALS
TEMPERATURE: 98.7 F | OXYGEN SATURATION: 96 % | SYSTOLIC BLOOD PRESSURE: 133 MMHG | RESPIRATION RATE: 20 BRPM | HEART RATE: 55 BPM | DIASTOLIC BLOOD PRESSURE: 67 MMHG

## 2018-06-09 VITALS
TEMPERATURE: 98.9 F | OXYGEN SATURATION: 96 % | RESPIRATION RATE: 20 BRPM | HEART RATE: 69 BPM | DIASTOLIC BLOOD PRESSURE: 75 MMHG | SYSTOLIC BLOOD PRESSURE: 118 MMHG

## 2018-06-09 LAB
ALBUMIN SERPL-MCNC: 2.6 GM/DL (ref 3.4–5)
ALP SERPL-CCNC: 68 U/L (ref 45–117)
ALT SERPL-CCNC: 25 U/L (ref 12–78)
AST SERPL-CCNC: 14 U/L (ref 15–37)
BASOPHILS # BLD AUTO: 0 TH/MM3 (ref 0–0.2)
BASOPHILS NFR BLD: 0.4 % (ref 0–2)
BILIRUB SERPL-MCNC: 0.4 MG/DL (ref 0.2–1)
BUN SERPL-MCNC: 14 MG/DL (ref 7–18)
CALCIUM SERPL-MCNC: 8.3 MG/DL (ref 8.5–10.1)
CHLORIDE SERPL-SCNC: 106 MEQ/L (ref 98–107)
CREAT SERPL-MCNC: 1.21 MG/DL (ref 0.6–1.3)
EOSINOPHIL # BLD: 0.2 TH/MM3 (ref 0–0.4)
EOSINOPHIL NFR BLD: 2.5 % (ref 0–4)
ERYTHROCYTE [DISTWIDTH] IN BLOOD BY AUTOMATED COUNT: 14 % (ref 11.6–17.2)
GFR SERPLBLD BASED ON 1.73 SQ M-ARVRAT: 61 ML/MIN (ref 89–?)
GLUCOSE SERPL-MCNC: 85 MG/DL (ref 74–106)
HCO3 BLD-SCNC: 27.2 MEQ/L (ref 21–32)
HCT VFR BLD CALC: 37.4 % (ref 39–51)
HGB BLD-MCNC: 12.6 GM/DL (ref 13–17)
LYMPHOCYTES # BLD AUTO: 1.1 TH/MM3 (ref 1–4.8)
LYMPHOCYTES NFR BLD AUTO: 16.3 % (ref 9–44)
MCH RBC QN AUTO: 31 PG (ref 27–34)
MCHC RBC AUTO-ENTMCNC: 33.8 % (ref 32–36)
MCV RBC AUTO: 91.7 FL (ref 80–100)
MONOCYTE #: 0.6 TH/MM3 (ref 0–0.9)
MONOCYTES NFR BLD: 8.6 % (ref 0–8)
NEUTROPHILS # BLD AUTO: 4.7 TH/MM3 (ref 1.8–7.7)
NEUTROPHILS NFR BLD AUTO: 72.2 % (ref 16–70)
PLATELET # BLD: 283 TH/MM3 (ref 150–450)
PMV BLD AUTO: 8.5 FL (ref 7–11)
PROT SERPL-MCNC: 5.7 GM/DL (ref 6.4–8.2)
RBC # BLD AUTO: 4.07 MIL/MM3 (ref 4.5–5.9)
SODIUM SERPL-SCNC: 141 MEQ/L (ref 136–145)
WBC # BLD AUTO: 6.5 TH/MM3 (ref 4–11)

## 2018-06-09 RX ADMIN — WATER SCH ML: 1 IRRIGANT IRRIGATION at 09:00

## 2018-06-09 RX ADMIN — DOCUSATE SODIUM SCH MG: 100 CAPSULE, LIQUID FILLED ORAL at 22:10

## 2018-06-09 RX ADMIN — BACITRACIN SCH APPLIC: 500 OINTMENT TOPICAL at 09:00

## 2018-06-09 RX ADMIN — BACITRACIN SCH APPLIC: 500 OINTMENT TOPICAL at 21:00

## 2018-06-09 RX ADMIN — Medication PRN ML: at 01:51

## 2018-06-09 RX ADMIN — SULFAMETHOXAZOLE AND TRIMETHOPRIM SCH TAB: 800; 160 TABLET ORAL at 13:10

## 2018-06-09 RX ADMIN — PANTOPRAZOLE SODIUM SCH MG: 40 INJECTION, POWDER, FOR SOLUTION INTRAVENOUS at 13:11

## 2018-06-09 RX ADMIN — HYDROMORPHONE HYDROCHLORIDE PRN MG: 2 INJECTION INTRAMUSCULAR; INTRAVENOUS; SUBCUTANEOUS at 14:38

## 2018-06-09 RX ADMIN — SULFAMETHOXAZOLE AND TRIMETHOPRIM SCH TAB: 800; 160 TABLET ORAL at 22:10

## 2018-06-09 RX ADMIN — HYDROMORPHONE HYDROCHLORIDE PRN MG: 2 INJECTION INTRAMUSCULAR; INTRAVENOUS; SUBCUTANEOUS at 01:51

## 2018-06-09 RX ADMIN — Medication SCH ML: at 09:00

## 2018-06-09 RX ADMIN — Medication SCH ML: at 22:11

## 2018-06-09 RX ADMIN — DOCUSATE SODIUM SCH MG: 100 CAPSULE, LIQUID FILLED ORAL at 13:10

## 2018-06-09 RX ADMIN — DULOXETINE SCH MG: 60 CAPSULE, DELAYED RELEASE ORAL at 13:10

## 2018-06-09 RX ADMIN — MORPHINE SULFATE SCH MG: 30 TABLET, EXTENDED RELEASE ORAL at 22:10

## 2018-06-09 RX ADMIN — MORPHINE SULFATE SCH MG: 30 TABLET, EXTENDED RELEASE ORAL at 13:10

## 2018-06-09 NOTE — HHI.GIFU
Subjective


Remarks


Patient is resting in the bed eyes closed but arouses to verbal stimuli


Appears comfortable with no facial grimace


Still complaints of nausea and decreased appetite


Still has complaints of right upper quadrant discomfort to light palpation


 (Shoshana Blunt)





Objective


Vitals I&O





Vital Signs








  Date Time  Temp Pulse Resp B/P (MAP) Pulse Ox O2 Delivery O2 Flow Rate FiO2


 


6/9/18 08:00 98.7 55 20 133/67 (89) 96   


 


6/9/18 00:01 97.7 54 18 115/77 (90) 94   


 


6/8/18 20:00      Room Air  


 


6/8/18 20:00 98.0 73 18 118/70 (86) 96   


 


6/8/18 16:00 98.0 58 18 130/79 (96) 95   














I/O      


 


 6/8/18 6/8/18 6/8/18 6/9/18 6/9/18 6/9/18





 07:00 15:00 23:00 07:00 15:00 23:00


 


Intake Total 720 ml 100 ml 720 ml 600 ml 120 ml 


 


Output Total    275 ml  


 


Balance 720 ml 100 ml 720 ml 325 ml 120 ml 


 


      


 


Intake Oral 720 ml  720 ml 600 ml 120 ml 


 


IV Total  100 ml    


 


Output Urine Total    275 ml  


 


# Voids 2  3 2  


 


# Bowel Movements 0   1  








Laboratory





Laboratory Tests








Test


  6/9/18


05:51


 


White Blood Count 6.5 


 


Red Blood Count 4.07 


 


Hemoglobin 12.6 


 


Hematocrit 37.4 


 


Mean Corpuscular Volume 91.7 


 


Mean Corpuscular Hemoglobin 31.0 


 


Mean Corpuscular Hemoglobin


Concent 33.8 


 


 


Red Cell Distribution Width 14.0 


 


Platelet Count 283 


 


Mean Platelet Volume 8.5 


 


Neutrophils (%) (Auto) 72.2 


 


Lymphocytes (%) (Auto) 16.3 


 


Monocytes (%) (Auto) 8.6 


 


Eosinophils (%) (Auto) 2.5 


 


Basophils (%) (Auto) 0.4 


 


Neutrophils # (Auto) 4.7 


 


Lymphocytes # (Auto) 1.1 


 


Monocytes # (Auto) 0.6 


 


Eosinophils # (Auto) 0.2 


 


Basophils # (Auto) 0.0 


 


CBC Comment DIFF FINAL 


 


Differential Comment  


 


Blood Urea Nitrogen 14 


 


Creatinine 1.21 


 


Random Glucose 85 


 


Total Protein 5.7 


 


Albumin 2.6 


 


Calcium Level 8.3 


 


Alkaline Phosphatase 68 


 


Aspartate Amino Transf


(AST/SGOT) 14 


 


 


Alanine Aminotransferase


(ALT/SGPT) 25 


 


 


Total Bilirubin 0.4 


 


Sodium Level 141 


 


Potassium Level 3.7 


 


Chloride Level 106 


 


Carbon Dioxide Level 27.2 


 


Anion Gap 8 


 


Estimat Glomerular Filtration


Rate 61 


 














 Date/Time


Source Procedure


Growth Status


 


 


 6/8/18 19:40


Stool Stool Cryptosporidium Exam


Pending Received


 


 6/8/18 19:40


Stool Stool Giardia Antigen (JIM)


Pending Received








Imaging





Last Impressions








Hepatobiliary Scan Nuclear Medicine 6/9/18 0000 Signed





Impressions: 





 CONCLUSION: 





 1.  Prompt visualization of the gallbladder with normal gallbladder ejection fr





 action.





  





 


 


Gall Bladder Ultrasound 6/7/18 0000 Signed





Impressions: 





 CONCLUSION:





 1.  No abnormality is identified to explain the right upper quadrant pain. Ther





 e are no gallstones.





 2.  Please note that the pancreas is not well visualized on this examination. H





 owever, it demonstrated no abnormality on yesterday's CT.





  





 





  





 





  





 








Physical Exam


HEENT: normocephalic; atraumatic; no jaundice. 


NECK: Neck is supple, thin


CHEST:  Chest is clear to auscultation and percussion.  No obvious rhonchi or 

wheezing


CARDIAC: RRR


ABDOMEN:  Soft, nondistended, right upper quadrant discomfort to light palpation

, bowel sounds are present in all four quadrants.


EXTREMITIES: No clubbing, cyanosis, or edema.


SKIN:  Normal; no rash; no jaundice.


CNS: answers questions appropriately


 (Shoshana Blunt)





Assessment and Plan


Plan


Assessment:


- Rectal bleeding- states intermittent for the past six months, approximately


  one episode of rectal bleeding a week- sometimes blood is mixed in stools


  and sometimes passes mostly just blood- previously evaluated by our 


  service for same complaint in March, EGD and colonoscopy --> Gastritis,


  diverticulosis and internal hemorrhoids. Did not follow up with GI after DC.


  H/H currently 12.7/37.5 was 12.7/36.8 on 3/27 during previous admission


- RUQ pain- started 2 days ago, intermittent- denies any aggravating or 


  alleviating factors. Denies nausea, vomiting.


  Gallbladder US (6/7) No abnormality is identified to explain the right upper 


  quadrant pain. There are no gallstones.


  CT abdomen and pelvis W IV contrast --> No acute finding is identified to 


  explain the clinical symptoms. Nonacute findings include mild atherosclerotic 


  disease as well as hepatic and renal cysts.





6/8/2018 patient still complains of some right upper quadrant discomfort to 

light palpation.  Ultrasound and CT scan unremarkable no gallstones noted 

patient does note acute on chronic constipation and does take chronic pain 

meds.  Notes last EGD and colon done March 2018 results above.  Current 

hemoglobin 13.4 no obvious bleeding no rectal bleeding but does still note 

constipation.  States MiraLAX ineffective will trial lactulose but if patient 

has chronic pain meds may need Linzess in the future.  Encourage patient not to 

strain and to wipe very gently after bowel movement.  Discussed using baby 

wipes. currently right upper quadrant pain workup negative.,  But patient does 

note chronic back pain.  Encouraged some increase activity in the room.  No 

stool studies so far.


6/9/2018 patient had HIDA scan today which showed normal ejection fraction, 

unremarkable exam.  Currently unknown source of right upper quadrant pain .  

Patient continues to have the same symptoms as the past 24 hours.  States 

decreased appetite but does appear to be eaten some of his food without nausea 

or vomiting.  Cake is at bedside.  Does note tenderness to light palpation.  No 

fever.  Hemoglobin stable at 12.6





Plan:


Diet, regular, evaluate monitor patient's percentage of intake of diet. 


lactulose


PPI


Monitor labs


capsule endoscopy outpatient , patient will need to follow-up with GI after 

discharge


Further recommendations based on clinical course





Pt has been seen and examined by myself and Dr. Ayala and this note is written 

on his behalf 


 (Shoshana Blunt)


Physician Comments


Seen and examined, plan as above, no obvious source for pain.


To consider pain management service.


Nothing to add at this point.


Please notify us if needed again.


 (Giancarlo Ayala MD)











Shoshana Blunt Jun 9, 2018 15:47


Giancarlo Ayala MD [place] 10, 2018 12:22

## 2018-06-09 NOTE — RADRPT
EXAM DATE:  6/9/2018 12:59 PM EDT

AGE/SEX:        57 years / Male



INDICATIONS:  Abdominal pain for one week.



CLINICAL DATA:  This is the patient's initial encounter. Patient reports that signs and symptoms have
 been present for 1 week and indicates a pain score of 8/10. 

                                                                          

MEDICAL/SURGICAL HISTORY:       Diverticulitis. Total knee replacement, right.  Fusion, cervical.



COMPARISON:      [facility] [place] ABDOMEN - GALLBLADDER, 6/7/2018.  . 

No external comparison.



DOSE:  4.2 mCi Tc-99m mebrofenin i.v.



Medication:  1.68  mcg Cholecystokinin IV  Identical symptomatic response

            Cholecystokinin was administered by slow infusion over 8 minutes beginning at 60 minutes.




TECHNIQUE: Following the intravenous administration of radiotracer, dynamic sequential images were pe
rformed with continuous acquisition. Time-activity curves were generated. 



FINDINGS:

Hepatic Kinetics: There is prompt uptake of radiotracer in the liver. No focal defects are seen. Ther
e is normal rate of washout from the hepatic parenchyma.

Biliary Clearance: Activity is first seen in the extrahepatic biliary system at 7 minutes.  There is 
normal excretion into the small bowel.

Gallbladder: Activity is first seen in the gallbladder at 21 minutes.

Post-CCK: After CCK administration, there is emptying of the gallbladder with a 75% ejection fraction
. Common bile duct kinetics are normal and there is no evidence of biliary obstruction.  Identical sy
mptomatic response after cholecystokinin infusion.  

Biliary-Enteric Reflux: None observed.



CONCLUSION: 

1.  Prompt visualization of the gallbladder with normal gallbladder ejection fraction.



Electronically signed by: Betito Gold MD  6/9/2018 1:02 PM EDT

## 2018-06-09 NOTE — HHI.PR
Subjective


Remarks


Patient reports that right-sided abdominal pain continues.  Patient denies 

smoking marijuana.  Denies any chest pain or shortness of breath.  He says he 

is not tolerating p.o. intake and cannot go home because he will be right back 

to the hospital if he leaves right now





Objective





Vital Signs








  Date Time  Temp Pulse Resp B/P (MAP) Pulse Ox O2 Delivery O2 Flow Rate FiO2


 


6/9/18 08:00 98.7 55 20 133/67 (89) 96   


 


6/9/18 00:01 97.7 54 18 115/77 (90) 94   


 


6/8/18 20:00      Room Air  


 


6/8/18 20:00 98.0 73 18 118/70 (86) 96   














I/O      


 


 6/8/18 6/8/18 6/8/18 6/9/18 6/9/18 6/9/18





 07:00 15:00 23:00 07:00 15:00 23:00


 


Intake Total 720 ml 100 ml 720 ml 600 ml 120 ml 


 


Output Total    275 ml  


 


Balance 720 ml 100 ml 720 ml 325 ml 120 ml 


 


      


 


Intake Oral 720 ml  720 ml 600 ml 120 ml 


 


IV Total  100 ml    


 


Output Urine Total    275 ml  


 


# Voids 2  3 2  


 


# Bowel Movements 0   1  








Result Diagram:  


6/9/18 0551                                                                    

            6/9/18 0551





Objective Remarks


GENERAL: Patient lying in bed.  Appears uncomfortable.


SKIN: Warm and dry.  Various subcentimeter erythematous lesions.


HEAD: Normocephalic.


EYES: No scleral icterus. No injection or drainage. 


NECK: Supple, trachea midline. No JVD.


CARDIOVASCULAR: Regular rate and rhythm without murmurs, gallops, or rubs. 


RESPIRATORY: Breath sounds equal bilaterally. No accessory muscle use.


GASTROINTESTINAL: Abdomen soft, non-tender, nondistended. 


MUSCULOSKELETAL: No cyanosis, or edema. 


BACK: Nontender without obvious deformity. No CVA tenderness.








A/P


Assessment and Plan





56 y/o male with abdominal pain and rectal bleeding. 





//Rectal bleeding


//Abdominal pain


HIDA scan ordered


Other workup has been negative


Alternative etiology could be an inflammatory bowel disease


GI following


= I do skin negative.  Patient reporting unable to tolerate p.o. intake.  Will 

start Marinol.  Patient reports withdrawal from oxycodone which she takes every 

4 hours.  Will start oxycodone.





//Leukocytosis


Resolved





//Multiple skin lesions


//History of MRSA


No signs of acute infection


Continue topical Bactroban for lesions


Standard isolation precautions


= Continue Bactrim to complete treatment course.





//Chronic pain


PTSD


Anxiety disorder


Depression


Continue home medications





//DVT prophylaxis


SCDs


Discharge Planning


Discharge home tomorrow if tolerating p.o. intake.











Jamal Louis MD Jun 9, 2018 16:10

## 2018-06-10 VITALS
SYSTOLIC BLOOD PRESSURE: 121 MMHG | RESPIRATION RATE: 16 BRPM | DIASTOLIC BLOOD PRESSURE: 69 MMHG | TEMPERATURE: 98 F | HEART RATE: 71 BPM | OXYGEN SATURATION: 94 %

## 2018-06-10 VITALS
TEMPERATURE: 98 F | RESPIRATION RATE: 16 BRPM | HEART RATE: 64 BPM | SYSTOLIC BLOOD PRESSURE: 115 MMHG | DIASTOLIC BLOOD PRESSURE: 64 MMHG | OXYGEN SATURATION: 95 %

## 2018-06-10 VITALS
RESPIRATION RATE: 16 BRPM | OXYGEN SATURATION: 95 % | DIASTOLIC BLOOD PRESSURE: 76 MMHG | HEART RATE: 53 BPM | SYSTOLIC BLOOD PRESSURE: 133 MMHG | TEMPERATURE: 97.8 F

## 2018-06-10 LAB
ALBUMIN SERPL-MCNC: 2.7 GM/DL (ref 3.4–5)
BASOPHILS # BLD AUTO: 0 TH/MM3 (ref 0–0.2)
BASOPHILS NFR BLD: 0.4 % (ref 0–2)
BUN SERPL-MCNC: 16 MG/DL (ref 7–18)
CALCIUM SERPL-MCNC: 8.5 MG/DL (ref 8.5–10.1)
CHLORIDE SERPL-SCNC: 103 MEQ/L (ref 98–107)
CREAT SERPL-MCNC: 1.25 MG/DL (ref 0.6–1.3)
EOSINOPHIL # BLD: 0.2 TH/MM3 (ref 0–0.4)
EOSINOPHIL NFR BLD: 2.3 % (ref 0–4)
ERYTHROCYTE [DISTWIDTH] IN BLOOD BY AUTOMATED COUNT: 14.1 % (ref 11.6–17.2)
GFR SERPLBLD BASED ON 1.73 SQ M-ARVRAT: 59 ML/MIN (ref 89–?)
GLUCOSE SERPL-MCNC: 94 MG/DL (ref 74–106)
HCO3 BLD-SCNC: 28.2 MEQ/L (ref 21–32)
HCT VFR BLD CALC: 41.8 % (ref 39–51)
HGB BLD-MCNC: 14 GM/DL (ref 13–17)
LYMPHOCYTES # BLD AUTO: 1.9 TH/MM3 (ref 1–4.8)
LYMPHOCYTES NFR BLD AUTO: 27.2 % (ref 9–44)
MAGNESIUM SERPL-MCNC: 2.4 MG/DL (ref 1.5–2.5)
MCH RBC QN AUTO: 30.9 PG (ref 27–34)
MCHC RBC AUTO-ENTMCNC: 33.5 % (ref 32–36)
MCV RBC AUTO: 92.2 FL (ref 80–100)
MONOCYTE #: 0.8 TH/MM3 (ref 0–0.9)
MONOCYTES NFR BLD: 10.9 % (ref 0–8)
NEUTROPHILS # BLD AUTO: 4.1 TH/MM3 (ref 1.8–7.7)
NEUTROPHILS NFR BLD AUTO: 59.2 % (ref 16–70)
PHOSPHATE SERPL-MCNC: 2.8 MG/DL (ref 2.5–4.9)
PLATELET # BLD: 315 TH/MM3 (ref 150–450)
PMV BLD AUTO: 8.5 FL (ref 7–11)
RBC # BLD AUTO: 4.53 MIL/MM3 (ref 4.5–5.9)
SODIUM SERPL-SCNC: 139 MEQ/L (ref 136–145)
WBC # BLD AUTO: 6.9 TH/MM3 (ref 4–11)

## 2018-06-10 RX ADMIN — DOCUSATE SODIUM SCH MG: 100 CAPSULE, LIQUID FILLED ORAL at 08:17

## 2018-06-10 RX ADMIN — Medication SCH ML: at 08:19

## 2018-06-10 RX ADMIN — BACITRACIN SCH APPLIC: 500 OINTMENT TOPICAL at 08:20

## 2018-06-10 RX ADMIN — SULFAMETHOXAZOLE AND TRIMETHOPRIM SCH TAB: 800; 160 TABLET ORAL at 08:17

## 2018-06-10 RX ADMIN — MORPHINE SULFATE SCH MG: 30 TABLET, EXTENDED RELEASE ORAL at 08:19

## 2018-06-10 RX ADMIN — DULOXETINE SCH MG: 60 CAPSULE, DELAYED RELEASE ORAL at 08:17

## 2018-06-10 RX ADMIN — WATER SCH ML: 1 IRRIGANT IRRIGATION at 08:20

## 2018-06-10 RX ADMIN — PANTOPRAZOLE SODIUM SCH MG: 40 INJECTION, POWDER, FOR SOLUTION INTRAVENOUS at 08:18

## 2018-06-10 NOTE — HHI.DS
__________________________________________________





Discharge Summary


Admission Date


Jun 6, 2018 at 20:45


Discharge Date:  [place] 10, 2018


Admitting Diagnosis





 


 


 





Abdominal pain





(1) Abdominal pain


ICD Code:  R10.9 - Unspecified abdominal pain


Procedures


EGD, colonoscopy with biopsy.  Please see report.


Brief History - From Admission


56 y/o male with a history of PTSD, anxiety, depression and chronic pain 

presented to the ED with complaints of abdominal pain and rectal bleeding. He 

states the pain began yesterday in his RUQ, 8/10 with radiation to his back and 

associated nausea. Worse with palpation, and slightly better with pain 

medication. He states the rectal bleeding has been going on intermittently for 

the last 6 months, he had a colonoscopy 6 months ago but did not follow up with 

the GI specialist and he has stop taking his Protonix because he states it does 

not work. He is also complaining on multiple wounds on his left arm and 

bilateral feet that he says will not heal and he has a history of MRSA. He 

states the wounds started as pimples that he popped and pus drained from them. 

He is unsure if he has had fevers at home but has had chills.


CBC/BMP:  


6/10/18 0810                                                                   

             6/10/18 0810





Significant Findings





Laboratory Tests








Test


  6/7/18


12:03 6/7/18


16:56 6/7/18


22:56 6/8/18


07:00


 


Hemoglobin


  12.7 GM/DL


(13.0-17.0) 


  12.5 GM/DL


(13.0-17.0) 


 


 


Hematocrit


  37.5 %


(39.0-51.0) 


  37.3 %


(39.0-51.0) 


 


 


Red Blood Count


  


  


  


  4.29 MIL/MM3


(4.50-5.90)


 


Monocytes (%) (Auto)


  


  


  


  12.6 %


(0.0-8.0)


 


Monocytes # (Auto)


  


  


  


  1.0 TH/MM3


(0-0.9)


 


Blood Urea Nitrogen


  


  


  


  20 MG/DL


(7-18)


 


Calcium Level


  


  


  


  7.9 MG/DL


(8.5-10.1)


 


Potassium Level


  


  


  


  3.4 MEQ/L


(3.5-5.1)


 


Estimat Glomerular Filtration


Rate 


  


  


  63 ML/MIN


(>89)


 


Test


  6/8/18


14:00 6/9/18


05:51 6/10/18


06:51 6/10/18


08:10


 


Total Protein


  6.1 GM/DL


(6.4-8.2) 5.7 GM/DL


(6.4-8.2) 


  


 


 


Albumin


  2.8 GM/DL


(3.4-5.0) 2.6 GM/DL


(3.4-5.0) 


  2.7 GM/DL


(3.4-5.0)


 


Red Blood Count


  


  4.07 MIL/MM3


(4.50-5.90) 


  


 


 


Hemoglobin


  


  12.6 GM/DL


(13.0-17.0) 


  


 


 


Hematocrit


  


  37.4 %


(39.0-51.0) 


  


 


 


Neutrophils (%) (Auto)


  


  72.2 %


(16.0-70.0) 


  


 


 


Monocytes (%) (Auto)


  


  8.6 %


(0.0-8.0) 


  10.9 %


(0.0-8.0)


 


Calcium Level


  


  8.3 MG/DL


(8.5-10.1) 


  


 


 


Aspartate Amino Transf


(AST/SGOT) 


  14 U/L (15-37) 


  


  


 


 


Estimat Glomerular Filtration


Rate 


  61 ML/MIN


(>89) 


  59 ML/MIN


(>89)


 


Urine Opiates Screen   POS (NEG)  


 


Urine Benzodiazepines Screen   POS (NEG)  


 


Urine Cannabinoids Screen   POS (NEG)  








Imaging





Last Impressions








Hepatobiliary Scan Nuclear Medicine 6/9/18 0000 Signed





Impressions: 





 CONCLUSION: 





 1.  Prompt visualization of the gallbladder with normal gallbladder ejection fr





 action.





  





 


 


Gall Bladder Ultrasound 6/7/18 0000 Signed





Impressions: 





 CONCLUSION:





 1.  No abnormality is identified to explain the right upper quadrant pain. Ther





 e are no gallstones.





 2.  Please note that the pancreas is not well visualized on this examination. H





 owever, it demonstrated no abnormality on yesterday's CT.





  





 





  





 





  





 








PE at Discharge


GENERAL: This is a well-nourished, well-developed patient, in no apparent 

distress.


SKIN:Multiple dry crusted lesions on bilateral feet and left elbow


HEAD: Atraumatic. Normocephalic. No temporal or scalp tenderness.


EYES: Pupils equal round and reactive. 


CARDIOVASCULAR: Regular rate and rhythm without murmurs, gallops, or rubs. 


RESPIRATORY: Clear to auscultation. Breath sounds equal bilaterally. No wheezes

, rales, or rhonchi.  


GASTROINTESTINAL: Abdomen soft, RUQ tenderness to palpation, nondistended. No 

hepato-splenomegaly, or palpable masses. No guarding.


MUSCULOSKELETAL: Extremities without clubbing, cyanosis, or edema. No joint 

tenderness, effusion, or edema noted. No calf tenderness. 


NEUROLOGICAL: Awake and alert.Five out of 5 muscle strength in all muscle 

groups.  Normal speech.


Hospital Course


56 y/o male with abdominal pain and rectal bleeding.  Patient underwent EGD and 

colonoscopy which showed gastritis, diverticulosis, internal hemorrhoids.  

Patient appears to be tolerating p.o. intake.  requesting increasing doses of 

pain meds.  Marinol was tried for appetite stimulation with some improvement.  

Follow-up with gastroenterology and primary care as outpatient.





For problem based summary from most recent progress note, please see below





//Rectal bleeding


//Abdominal pain


HIDA scan ordered


Other workup has been negative


Alternative etiology could be an inflammatory bowel disease


GI following


= I do skin negative.  Patient reporting unable to tolerate p.o. intake.  Will 

start Marinol.  Patient reports withdrawal from oxycodone which she takes every 

4 hours.  Will start oxycodone.


= 6/10.  Able to tolerate some p.o. intake.  Recommend Ensure Plus or other 

supplement at home.  Follow with GI and primary care as outpatient.





//Leukocytosis


Resolved





//Multiple skin lesions


//History of MRSA


No signs of acute infection


Continue topical Bactroban for lesions


Standard isolation precautions


= Continue Bactrim to complete treatment course.





//Chronic pain


PTSD


Anxiety disorder


Depression


Continue home medications





//DVT prophylaxis


SCDs


Discharge Planning


Discharge home.


Pt Condition on Discharge:  Good


Discharge Disposition:  Discharge Home


Discharge Time:  <= 30 minutes


Discharge Instructions


DIET: Follow Instructions for:  Heart Healthy Diet


Activities you can perform:  Regular-No Restrictions


Follow up Referrals:  


Gastroenterology - 1 Week with Giancarlo Ayala MD


PCP Follow-up - 1 Week





New Medications:  


Dronabinol (Dronabinol) 5 Mg Cap


5 MG PO BID for appetite, #14 CAP 0 Refills





Pantoprazole (Pantoprazole) 20 Mg Tab


20 MG PO DAILY for Reflux, #30 TAB 0 Refills





Docusate Sodium (Dok) 100 Mg Cap


100 MG PO BID for Constipation for 30 Days, #60 CAP





Sulfamethoxazole-Trimethoprim (Sulfamethoxazole-Trimethoprim) 800-160 Mg Tab


1 TAB PO Q12HR for Infection for 5 Days, TAB





 


Continued Medications:  


Alprazolam (Xanax) 2 Mg Tab


2 MG PO HS, TAB 0 Refills





Aspirin DR (Aspirin EC) 81 Mg Tabdr


81 MG PO DAILY, TAB 0 Refills





Duloxetine DR (Cymbalta DR) 60 Mg Capdr


60 MG PO DAILY, #30 CAP 0 Refills





Morphine ER (Ms Contin) 200 Mg Tab


50 MG PO BID for Pain Management, TAB 0 Refills





Testosterone Cypionate Inj (Testosterone Cypionate Inj) 100 Mg/Ml Inj


100 MG IM WEEKLY for Hormone Replacement, #1 INJECTION 0 Refills





 


Discontinued Medications:  


Oxycodone ER (Oxycontin) 10 Mg Tab


Unknown Dose PO Q12HR for Pain Management, TAB 0 Refills

















Jamal Louis MD [place] 10, 2018 11:36

## 2018-06-10 NOTE — HHI.GIFU
Subjective


Remarks


Patient is resting in the bed


Stools appear to be anxious with some fidgeting


Appetite poor to fair and has been started on some medications to help increase


Supportive care given


Continues to complain of right upper quadrant tenderness, normal bowel movement 

on 6/9/2018





Objective


Vitals I&O





Vital Signs








  Date Time  Temp Pulse Resp B/P (MAP) Pulse Ox O2 Delivery O2 Flow Rate FiO2


 


6/10/18 00:35 97.8 53 16 133/76 (95) 95   


 


6/9/18 22:16   18     


 


6/9/18 22:12   18     


 


6/9/18 20:35 98.0 69 16 152/86 (108) 95   


 


6/9/18 16:00 98.9 69 20 118/75 (89) 96   














I/O      


 


 6/9/18 6/9/18 6/9/18 6/10/18 6/10/18 6/10/18





 07:00 15:00 23:00 07:00 15:00 23:00


 


Intake Total 600 ml 120 ml 1200 ml 720 ml  


 


Output Total 275 ml  1000 ml   


 


Balance 325 ml 120 ml 200 ml 720 ml  


 


      


 


Intake Oral 600 ml 120 ml 1200 ml 720 ml  


 


Output Urine Total 275 ml  1000 ml   


 


# Voids 2   4  


 


# Bowel Movements 1  0 0  








Laboratory





Laboratory Tests








Test


  6/10/18


06:51 6/10/18


08:10


 


Urine Opiates Screen POS  


 


Urine Barbiturates Screen NEG  


 


Urine Amphetamines Screen NEG  


 


Urine Benzodiazepines Screen POS  


 


Urine Cocaine Screen NEG  


 


Urine Cannabinoids Screen POS  


 


White Blood Count  6.9 


 


Red Blood Count  4.53 


 


Hemoglobin  14.0 


 


Hematocrit  41.8 


 


Mean Corpuscular Volume  92.2 


 


Mean Corpuscular Hemoglobin  30.9 


 


Mean Corpuscular Hemoglobin


Concent 


  33.5 


 


 


Red Cell Distribution Width  14.1 


 


Platelet Count  315 


 


Mean Platelet Volume  8.5 


 


Neutrophils (%) (Auto)  59.2 


 


Lymphocytes (%) (Auto)  27.2 


 


Monocytes (%) (Auto)  10.9 


 


Eosinophils (%) (Auto)  2.3 


 


Basophils (%) (Auto)  0.4 


 


Neutrophils # (Auto)  4.1 


 


Lymphocytes # (Auto)  1.9 


 


Monocytes # (Auto)  0.8 


 


Eosinophils # (Auto)  0.2 


 


Basophils # (Auto)  0.0 


 


CBC Comment  DIFF FINAL 


 


Differential Comment   


 


Blood Urea Nitrogen  16 


 


Creatinine  1.25 


 


Random Glucose  94 


 


Albumin  2.7 


 


Calcium Level  8.5 


 


Phosphorus Level  2.8 


 


Magnesium Level  2.4 


 


Sodium Level  139 


 


Potassium Level  3.8 


 


Chloride Level  103 


 


Carbon Dioxide Level  28.2 


 


Anion Gap  8 


 


Estimat Glomerular Filtration


Rate 


  59 


 














 Date/Time


Source Procedure


Growth Status


 


 


 6/8/18 19:40


Stool Stool Cryptosporidium Exam


Pending Received


 


 6/8/18 19:40


Stool Stool Giardia Antigen (JIM)


Pending Received








Imaging





Last Impressions








Hepatobiliary Scan Nuclear Medicine 6/9/18 0000 Signed





Impressions: 





 CONCLUSION: 





 1.  Prompt visualization of the gallbladder with normal gallbladder ejection fr





 action.





  





 


 


Gall Bladder Ultrasound 6/7/18 0000 Signed





Impressions: 





 CONCLUSION:





 1.  No abnormality is identified to explain the right upper quadrant pain. Ther





 e are no gallstones.





 2.  Please note that the pancreas is not well visualized on this examination. H





 owever, it demonstrated no abnormality on yesterday's CT.





  





 





  





 





  





 








Physical Exam


HEENT: normocephalic; atraumatic; no jaundice.  Upper normal PMI


NECK: Neck is supple, thin


CHEST:  Chest is clear to auscultation and percussion.  No obvious rhonchi or 

wheezing


CARDIAC: RRR


ABDOMEN:  Soft, nondistended, right upper quadrant discomfort to light 

palpation and at rest, bowel sounds are present in all four quadrants.


EXTREMITIES: No edema.


SKIN:  Normal; no rash; no jaundice.


CNS: answers questions appropriately anxiety





Assessment and Plan


Plan


Assessment:


- Rectal bleeding- states intermittent for the past six months, approximately


  one episode of rectal bleeding a week- sometimes blood is mixed in stools


  and sometimes passes mostly just blood- previously evaluated by our 


  service for same complaint in March, EGD and colonoscopy --> Gastritis,


  diverticulosis and internal hemorrhoids. Did not follow up with GI after DC.


  H/H currently 12.7/37.5 was 12.7/36.8 on 3/27 during previous admission


- RUQ pain- started 2 days ago, intermittent- denies any aggravating or 


  alleviating factors. Denies nausea, vomiting.


  Gallbladder US (6/7) No abnormality is identified to explain the right upper 


  quadrant pain. There are no gallstones.


  CT abdomen and pelvis W IV contrast --> No acute finding is identified to 


  explain the clinical symptoms. Nonacute findings include mild atherosclerotic 


  disease as well as hepatic and renal cysts.





6/8/2018 patient still complains of some right upper quadrant discomfort to 

light palpation.  Ultrasound and CT scan unremarkable no gallstones noted 

patient does note acute on chronic constipation and does take chronic pain 

meds.  Notes last EGD and colon done March 2018 results above.  Current 

hemoglobin 13.4 no obvious bleeding no rectal bleeding but does still note 

constipation.  States MiraLAX ineffective will trial lactulose but if patient 

has chronic pain meds may need Linzess in the future.  Encourage patient not to 

strain and to wipe very gently after bowel movement.  Discussed using baby 

wipes. currently right upper quadrant pain workup negative.,  But patient does 

note chronic back pain.  Encouraged some increase activity in the room.  No 

stool studies so far.


6/9/2018 patient had HIDA scan today which showed normal ejection fraction, 

unremarkable exam.  Currently unknown source of right upper quadrant pain .  

Patient continues to have the same symptoms as the past 24 hours.  States 

decreased appetite but does appear to be eaten some of his food without nausea 

or vomiting.  Cake is at bedside.  Does note tenderness to light palpation.  No 

fever.  Hemoglobin stable at 12.6


6/10/2018 patient's hemoglobin has trended upwards to 14.  Rectal bleeding has 

resolved.  Patient does note a history of some constipation and probable 

straining.  Encouraged hydration and fiber with his diet.  Still unspecified 

right upper quadrant pain unless related to chronic constipation.  Normal 

bilirubin and LFTs.  Consider pain management





Plan:


Diet, regular, evaluate monitor patient's percentage of intake of diet. 


lactulose


PPI


Monitor labs, hemoglobin trending back up to 14


capsule endoscopy outpatient , patient will need to follow-up with GI after 

discharge


Will follow patient on an outpatient basis no further procedures or GI needs 

while in the hospital/.  Will sign off for now





Pt has been seen and examined by myself and Dr. Ayala and this note is written 

on his behalf











Shoshana Blunt [place] 10, 2018 14:41

## 2018-06-10 NOTE — HHI.PR
Subjective


Remarks


Patient says that pain is unchanged.  He agrees to follow-up with outpatient 

consultants





Objective





Vital Signs








  Date Time  Temp Pulse Resp B/P (MAP) Pulse Ox O2 Delivery O2 Flow Rate FiO2


 


6/10/18 00:35 97.8 53 16 133/76 (95) 95   


 


6/9/18 22:16   18     


 


6/9/18 22:12   18     


 


6/9/18 20:35 98.0 69 16 152/86 (108) 95   


 


6/9/18 16:00 98.9 69 20 118/75 (89) 96   














I/O      


 


 6/9/18 6/9/18 6/9/18 6/10/18 6/10/18 6/10/18





 07:00 15:00 23:00 07:00 15:00 23:00


 


Intake Total 600 ml 120 ml 1200 ml 720 ml  


 


Output Total 275 ml  1000 ml   


 


Balance 325 ml 120 ml 200 ml 720 ml  


 


      


 


Intake Oral 600 ml 120 ml 1200 ml 720 ml  


 


Output Urine Total 275 ml  1000 ml   


 


# Voids 2   4  


 


# Bowel Movements 1  0 0  








Result Diagram:  


6/10/18 0810                                                                   

             6/10/18 0810





Objective Remarks


GENERAL: Patient lying in bed.  Appears uncomfortable.  No change on exam


SKIN: Warm and dry.  Various subcentimeter erythematous lesions.


HEAD: Normocephalic.


EYES: No scleral icterus. No injection or drainage. 


NECK: Supple, trachea midline. No JVD.


CARDIOVASCULAR: Regular rate and rhythm without murmurs, gallops, or rubs. 


RESPIRATORY: Breath sounds equal bilaterally. No accessory muscle use.


GASTROINTESTINAL: Abdomen soft, non-tender, nondistended. 


MUSCULOSKELETAL: No cyanosis, or edema. 


BACK: Nontender without obvious deformity. No CVA tenderness.








A/P


Assessment and Plan





56 y/o male with abdominal pain and rectal bleeding. 





//Rectal bleeding


//Abdominal pain


HIDA scan ordered


Other workup has been negative


Alternative etiology could be an inflammatory bowel disease


GI following


= I do skin negative.  Patient reporting unable to tolerate p.o. intake.  Will 

start Marinol.  Patient reports withdrawal from oxycodone which she takes every 

4 hours.  Will start oxycodone.


= 6/10.  Able to tolerate some p.o. intake.  Recommend Ensure Plus or other 

supplement at home.  Follow with GI and primary care as outpatient.





//Leukocytosis


Resolved





//Multiple skin lesions


//History of MRSA


No signs of acute infection


Continue topical Bactroban for lesions


Standard isolation precautions


= Continue Bactrim to complete treatment course.





//Chronic pain


PTSD


Anxiety disorder


Depression


Continue home medications





//DVT prophylaxis


SCDs


Discharge Planning


Discharge home.











Jamal Louis MD [place] 10, 2018 11:29

## 2018-11-16 NOTE — HHI.DCPOC
Discharge Care Plan


Diagnosis:  


(1) Atypical chest pain


(2) Tobacco abuse


(3) Aortic stenosis


Goals to Promote Your Health


* To prevent worsening of your condition and complications


* To maintain your health at the optimal level


Directions to Meet Your Goals


*** Take your medications as prescribed


*** Follow your dietary instruction


*** Follow activity as directed








*** Keep your appointments as scheduled


*** Take your immunizations and boosters as scheduled


*** If your symptoms worsen call your PCP, if no PCP go to Urgent Care Center 

or Emergency Room***


*** Smoking is Dangerous to Your Health. Avoid second hand smoke***


***Call the 24-hour hour crisis hotline for domestic abuse at 1-197.456.8628***











Sue Krishnamurthy Mar 24, 2018 10:06
16-Nov-2018 13:49